# Patient Record
Sex: MALE | Race: WHITE | Employment: OTHER | URBAN - METROPOLITAN AREA
[De-identification: names, ages, dates, MRNs, and addresses within clinical notes are randomized per-mention and may not be internally consistent; named-entity substitution may affect disease eponyms.]

---

## 2017-02-28 ENCOUNTER — OFFICE VISIT (OUTPATIENT)
Dept: INTERNAL MEDICINE CLINIC | Facility: CLINIC | Age: 72
End: 2017-02-28

## 2017-02-28 VITALS
HEART RATE: 67 BPM | SYSTOLIC BLOOD PRESSURE: 102 MMHG | OXYGEN SATURATION: 98 % | BODY MASS INDEX: 24 KG/M2 | WEIGHT: 159.19 LBS | TEMPERATURE: 98 F | RESPIRATION RATE: 14 BRPM | DIASTOLIC BLOOD PRESSURE: 66 MMHG

## 2017-02-28 DIAGNOSIS — E78.5 HYPERLIPIDEMIA, UNSPECIFIED HYPERLIPIDEMIA TYPE: ICD-10-CM

## 2017-02-28 DIAGNOSIS — C02.9 TONGUE CANCER (HCC): ICD-10-CM

## 2017-02-28 DIAGNOSIS — Z98.890 HISTORY OF AAA (ABDOMINAL AORTIC ANEURYSM) REPAIR: ICD-10-CM

## 2017-02-28 DIAGNOSIS — I25.10 CORONARY ARTERY DISEASE INVOLVING NATIVE CORONARY ARTERY OF NATIVE HEART WITHOUT ANGINA PECTORIS: Primary | ICD-10-CM

## 2017-02-28 DIAGNOSIS — Z00.00 ROUTINE HEALTH MAINTENANCE: ICD-10-CM

## 2017-02-28 DIAGNOSIS — Z86.010 HISTORY OF COLON POLYPS: ICD-10-CM

## 2017-02-28 PROCEDURE — 99214 OFFICE O/P EST MOD 30 MIN: CPT | Performed by: INTERNAL MEDICINE

## 2017-02-28 PROCEDURE — G0463 HOSPITAL OUTPT CLINIC VISIT: HCPCS | Performed by: INTERNAL MEDICINE

## 2017-02-28 NOTE — PROGRESS NOTES
Rebecca Erickson is a 66year old male   HPI:   Pt.presents for the following problems. Patient has a history of tongue cancer. No evidence of disease. Patient sees 3 physicians for follow-up. He sees ENT at [facility].   Dr. Aby Odonnell from radiation therapy and by mouth daily. Disp:  Rfl:    Polyethyl Glycol-Propyl Glycol (SYSTANE) 0.4-0.3 % Ophthalmic Solution 1 drop daily as needed.  As directed  Disp:  Rfl:    Calcium Carb-Cholecalciferol (CALCIUM 600 + D) 600-200 MG-UNIT Oral Tab Take 1 tablet by mouth 2 (two) Disease Mother      CAD   • Heart Surgery Mother [de-identified]     CABG      Social History:    Smoking Status: Former Smoker                   Packs/Day: 0.75  Years: 22        Quit date: 06/15/2003    Smokeless Status: Never Used                        Alcohol Use: (abdominal aortic aneurysm) repair  Asymptomatic. Patient due for updated CT aorta. Patient has order. 4. Tongue cancer ([facility] [address]  Asymptomatic. Followed by oncology, radiation therapy and ENT.     5. Routine health maintenance  Patient up-to-date with hi

## 2017-03-17 ENCOUNTER — PRIOR ORIGINAL RECORDS (OUTPATIENT)
Dept: OTHER | Age: 72
End: 2017-03-17

## 2017-04-11 ENCOUNTER — HOSPITAL ENCOUNTER (OUTPATIENT)
Dept: CT IMAGING | Facility: HOSPITAL | Age: 72
Discharge: HOME | End: 2017-04-11
Attending: RADIOLOGY
Payer: MEDICARE

## 2017-04-11 DIAGNOSIS — I71.4 AAA (ABDOMINAL AORTIC ANEURYSM) WITHOUT RUPTURE (HCC): ICD-10-CM

## 2017-04-11 PROCEDURE — 82565 ASSAY OF CREATININE: CPT

## 2017-04-11 PROCEDURE — 74178 CT ABD&PLV WO CNTR FLWD CNTR: CPT

## 2017-04-20 ENCOUNTER — TELEPHONE (OUTPATIENT)
Dept: INTERNAL MEDICINE CLINIC | Facility: CLINIC | Age: 72
End: 2017-04-20

## 2017-04-20 NOTE — TELEPHONE ENCOUNTER
Received communication from Dr. Shankar Murray regarding patient's CAT scan. He indicated that a reviewed CT. Status post endovascular AAA repair April 13, 2015. Endograft remains well-positioned. No endoleak.   Given good outcome and continued stability yearly C

## 2017-08-29 ENCOUNTER — OFFICE VISIT (OUTPATIENT)
Dept: INTERNAL MEDICINE CLINIC | Facility: CLINIC | Age: 72
End: 2017-08-29

## 2017-08-29 VITALS
HEIGHT: 69 IN | TEMPERATURE: 98 F | SYSTOLIC BLOOD PRESSURE: 98 MMHG | WEIGHT: 157.19 LBS | DIASTOLIC BLOOD PRESSURE: 64 MMHG | OXYGEN SATURATION: 98 % | BODY MASS INDEX: 23.28 KG/M2 | HEART RATE: 60 BPM

## 2017-08-29 DIAGNOSIS — I25.10 CORONARY ARTERY DISEASE INVOLVING NATIVE CORONARY ARTERY OF NATIVE HEART WITHOUT ANGINA PECTORIS: Primary | ICD-10-CM

## 2017-08-29 DIAGNOSIS — Z86.010 HISTORY OF COLON POLYPS: ICD-10-CM

## 2017-08-29 DIAGNOSIS — C02.9 TONGUE CANCER (HCC): ICD-10-CM

## 2017-08-29 DIAGNOSIS — J92.0 ASBESTOS-INDUCED PLEURAL PLAQUE: ICD-10-CM

## 2017-08-29 DIAGNOSIS — E78.5 HYPERLIPIDEMIA, UNSPECIFIED HYPERLIPIDEMIA TYPE: ICD-10-CM

## 2017-08-29 DIAGNOSIS — Z98.890 HISTORY OF AAA (ABDOMINAL AORTIC ANEURYSM) REPAIR: ICD-10-CM

## 2017-08-29 DIAGNOSIS — Z00.00 ROUTINE HEALTH MAINTENANCE: ICD-10-CM

## 2017-08-29 PROCEDURE — 99214 OFFICE O/P EST MOD 30 MIN: CPT | Performed by: INTERNAL MEDICINE

## 2017-08-29 PROCEDURE — G0463 HOSPITAL OUTPT CLINIC VISIT: HCPCS | Performed by: INTERNAL MEDICINE

## 2017-08-29 NOTE — PROGRESS NOTES
Rebecca Erickson is a 66year old male   HPI:   Pt.presents for the following problems. Patient feels well. He has no complaints. He has a history of coronary artery disease. Status post CABG.   He will see Dr. Kavitha Black next year with stress test.    He (SYSTANE) 0.4-0.3 % Ophthalmic Solution 1 drop daily as needed. As directed  Disp:  Rfl:    Calcium Carb-Cholecalciferol (CALCIUM 600 + D) 600-200 MG-UNIT Oral Tab Take 1 tablet by mouth 2 (two) times daily.  Disp:  Rfl:    Magnesium Cl-Calcium Carbonate (S CAD      Social History:  Smoking status: Former Smoker                                                              Packs/day: 0.75      Years: 25.00        Quit date: 6/15/2003  Smokeless tobacco: Never Used                      Alcohol use:  No year.    2. Hyperlipidemia, unspecified hyperlipidemia type  Stable. Lipids last December were at goal.  Will recheck lipid sometime this December    3. History of AAA (abdominal aortic aneurysm) repair  Patient had CT scan with Dr. Radha Singh April 2017.   Next

## 2017-10-24 RX ORDER — SIMVASTATIN 40 MG
TABLET ORAL
Qty: 90 TABLET | Refills: 3 | Status: SHIPPED | OUTPATIENT
Start: 2017-10-24 | End: 2018-09-17

## 2018-03-19 ENCOUNTER — APPOINTMENT (OUTPATIENT)
Dept: LAB | Age: 73
End: 2018-03-19
Attending: INTERNAL MEDICINE
Payer: MEDICARE

## 2018-03-19 ENCOUNTER — MYAURORA ACCOUNT LINK (OUTPATIENT)
Dept: OTHER | Age: 73
End: 2018-03-19

## 2018-03-19 ENCOUNTER — TELEPHONE (OUTPATIENT)
Dept: INTERNAL MEDICINE CLINIC | Facility: CLINIC | Age: 73
End: 2018-03-19

## 2018-03-19 ENCOUNTER — OFFICE VISIT (OUTPATIENT)
Dept: INTERNAL MEDICINE CLINIC | Facility: CLINIC | Age: 73
End: 2018-03-19

## 2018-03-19 VITALS
WEIGHT: 158 LBS | SYSTOLIC BLOOD PRESSURE: 130 MMHG | DIASTOLIC BLOOD PRESSURE: 66 MMHG | TEMPERATURE: 99 F | BODY MASS INDEX: 23 KG/M2 | HEART RATE: 64 BPM

## 2018-03-19 DIAGNOSIS — I25.10 CORONARY ARTERY DISEASE INVOLVING NATIVE CORONARY ARTERY OF NATIVE HEART WITHOUT ANGINA PECTORIS: Primary | ICD-10-CM

## 2018-03-19 DIAGNOSIS — Z98.890 HISTORY OF AAA (ABDOMINAL AORTIC ANEURYSM) REPAIR: ICD-10-CM

## 2018-03-19 DIAGNOSIS — K40.90 LEFT INGUINAL HERNIA: ICD-10-CM

## 2018-03-19 DIAGNOSIS — Z86.010 HISTORY OF COLON POLYPS: ICD-10-CM

## 2018-03-19 DIAGNOSIS — I25.10 CORONARY ARTERY DISEASE INVOLVING NATIVE CORONARY ARTERY OF NATIVE HEART WITHOUT ANGINA PECTORIS: ICD-10-CM

## 2018-03-19 DIAGNOSIS — C02.9 TONGUE CANCER (HCC): ICD-10-CM

## 2018-03-19 DIAGNOSIS — Z00.00 ROUTINE HEALTH MAINTENANCE: ICD-10-CM

## 2018-03-19 LAB
CHOLEST SERPL-MCNC: 160 MG/DL (ref 110–200)
CHOLESTEROL, TOTAL: 160 MG/DL
HDL CHOLESTEROL: 56 MG/DL
HDLC SERPL-MCNC: 56 MG/DL
LDL CHOLESTEROL: 90 MG/DL
LDLC SERPL CALC-MCNC: 90 MG/DL (ref 0–99)
NON-HDL CHOLESTEROL: 104 MG/DL
NONHDLC SERPL-MCNC: 104 MG/DL
TRIGL SERPL-MCNC: 71 MG/DL (ref 1–149)
TRIGLYCERIDES: 71 MG/DL

## 2018-03-19 PROCEDURE — 36415 COLL VENOUS BLD VENIPUNCTURE: CPT

## 2018-03-19 PROCEDURE — 99214 OFFICE O/P EST MOD 30 MIN: CPT | Performed by: INTERNAL MEDICINE

## 2018-03-19 PROCEDURE — 80061 LIPID PANEL: CPT

## 2018-03-19 PROCEDURE — G0463 HOSPITAL OUTPT CLINIC VISIT: HCPCS | Performed by: INTERNAL MEDICINE

## 2018-03-19 NOTE — TELEPHONE ENCOUNTER
Message relayed to patient who verbalized understanding. Copy of 3/19/18 lipid panel results faxed to Dr. Leesa Samuels and put in the outgoing mail addressed to the pt's home address as listed in Epic. Nothing further at this time.

## 2018-03-19 NOTE — TELEPHONE ENCOUNTER
Please let patient know that his cholesterol came out satisfactory. He can have the numbers. Please fax to Dr. Elgin Romero. Thank you.

## 2018-03-19 NOTE — PROGRESS NOTES
Merlyn Dent is a 67year old male   HPI:   Pt.presents for the following problems. Patient doing well. He has no acute symptoms.     He did say that he was told that Dr. Diego Christianson would call with need for follow-up in regards to his abdominal aortic aneur TAKE 1 TABLET EVERY NIGHT Disp: 90 tablet Rfl: 3   latanoprost 0.005 % Ophthalmic Solution Place 1 drop into both eyes nightly. Disp:  Rfl:    Multiple Vitamins-Minerals (ICAPS AREDS FORMULA) Oral Tab Take 1 tablet by mouth daily.  Disp:  Rfl:    aspirin Lesion  No date: OTHER SURGICAL HISTORY      Comment: repair abdominal aneurysm  No date: TONSILLECTOMY   Family History   Problem Relation Age of Onset   • Cancer Father      bladder cancer   • Heart Disease Father      congenital heart disease   • Heart Patient declines be last MARLENY February thousand 17. Next colonoscopy February 2019  EXTREMITIES:  no cyanosis, clubbing or edema. NEURO:  Awake and aware. ASSESSMENT AND PLAN:   1.  Coronary artery disease involving native coronary artery of native h

## 2018-03-20 ENCOUNTER — MYAURORA ACCOUNT LINK (OUTPATIENT)
Dept: OTHER | Age: 73
End: 2018-03-20

## 2018-03-20 ENCOUNTER — PRIOR ORIGINAL RECORDS (OUTPATIENT)
Dept: OTHER | Age: 73
End: 2018-03-20

## 2018-03-21 ENCOUNTER — EXTERNAL RECORD (OUTPATIENT)
Dept: HEALTH INFORMATION MANAGEMENT | Facility: OTHER | Age: 73
End: 2018-03-21

## 2018-05-17 ENCOUNTER — HOSPITAL ENCOUNTER (OUTPATIENT)
Dept: CT IMAGING | Facility: HOSPITAL | Age: 73
Discharge: HOME | End: 2018-05-17
Attending: RADIOLOGY
Payer: MEDICARE

## 2018-05-17 DIAGNOSIS — I71.4 AAA (ABDOMINAL AORTIC ANEURYSM) (HCC): ICD-10-CM

## 2018-05-17 PROCEDURE — 82565 ASSAY OF CREATININE: CPT

## 2018-05-17 PROCEDURE — 74178 CT ABD&PLV WO CNTR FLWD CNTR: CPT | Performed by: RADIOLOGY

## 2018-05-29 ENCOUNTER — LAB ENCOUNTER (OUTPATIENT)
Dept: LAB | Age: 73
End: 2018-05-29
Attending: INTERNAL MEDICINE
Payer: MEDICARE

## 2018-05-29 DIAGNOSIS — I25.10 CAD (CORONARY ARTERY DISEASE): Primary | ICD-10-CM

## 2018-05-29 PROCEDURE — 80061 LIPID PANEL: CPT

## 2018-05-29 PROCEDURE — 36415 COLL VENOUS BLD VENIPUNCTURE: CPT

## 2018-05-29 PROCEDURE — 84460 ALANINE AMINO (ALT) (SGPT): CPT

## 2018-05-29 PROCEDURE — 84450 TRANSFERASE (AST) (SGOT): CPT

## 2018-05-29 PROCEDURE — 82550 ASSAY OF CK (CPK): CPT

## 2018-07-25 ENCOUNTER — HOSPITAL ENCOUNTER (OUTPATIENT)
Dept: GENERAL RADIOLOGY | Age: 73
Discharge: HOME | End: 2018-07-25
Attending: OTOLARYNGOLOGY
Payer: MEDICARE

## 2018-07-25 DIAGNOSIS — C80.1 CANCER (HCC): ICD-10-CM

## 2018-07-25 PROCEDURE — 71046 X-RAY EXAM CHEST 2 VIEWS: CPT | Performed by: OTOLARYNGOLOGY

## 2018-09-11 ENCOUNTER — TELEPHONE (OUTPATIENT)
Dept: INTERNAL MEDICINE CLINIC | Facility: CLINIC | Age: 73
End: 2018-09-11

## 2018-09-11 ENCOUNTER — HOSPITAL ENCOUNTER (OUTPATIENT)
Dept: ULTRASOUND IMAGING | Facility: HOSPITAL | Age: 73
Discharge: HOME | End: 2018-09-11
Attending: CLINICAL NURSE SPECIALIST
Payer: MEDICARE

## 2018-09-11 DIAGNOSIS — I82.512 CHRONIC DEEP VEIN THROMBOSIS (DVT) OF FEMORAL VEIN OF LEFT LOWER EXTREMITY (HCC): ICD-10-CM

## 2018-09-11 PROCEDURE — 93971 EXTREMITY STUDY: CPT | Performed by: CLINICAL NURSE SPECIALIST

## 2018-09-12 NOTE — TELEPHONE ENCOUNTER
Please let patient know that his venous Doppler was good. No blood clots. Here for her to gotten a call from Danika Sanchez.

## 2018-09-13 ENCOUNTER — PRIOR ORIGINAL RECORDS (OUTPATIENT)
Dept: OTHER | Age: 73
End: 2018-09-13

## 2018-09-13 ENCOUNTER — HOSPITAL (OUTPATIENT)
Dept: OTHER | Age: 73
End: 2018-09-13
Attending: RADIOLOGY

## 2018-09-14 ENCOUNTER — TELEPHONE (OUTPATIENT)
Dept: INTERNAL MEDICINE CLINIC | Facility: CLINIC | Age: 73
End: 2018-09-14

## 2018-09-14 NOTE — TELEPHONE ENCOUNTER
Please asked Dr. Misha Lindsay office if they could fax us all the patient's colonoscopies and pathology reports that he had in their file. Trying to complete our file here in the office.

## 2018-09-14 NOTE — TELEPHONE ENCOUNTER
To Dr. Harriet Flaherty - per Dr. Frank Left office: colonoscopies/path reports from 2014, 2016 are being faxed to us.

## 2018-09-17 ENCOUNTER — APPOINTMENT (OUTPATIENT)
Dept: LAB | Age: 73
End: 2018-09-17
Attending: INTERNAL MEDICINE
Payer: MEDICARE

## 2018-09-17 ENCOUNTER — OFFICE VISIT (OUTPATIENT)
Dept: INTERNAL MEDICINE CLINIC | Facility: CLINIC | Age: 73
End: 2018-09-17
Payer: MEDICARE

## 2018-09-17 VITALS
HEIGHT: 68 IN | DIASTOLIC BLOOD PRESSURE: 58 MMHG | SYSTOLIC BLOOD PRESSURE: 92 MMHG | HEART RATE: 72 BPM | WEIGHT: 156.38 LBS | BODY MASS INDEX: 23.7 KG/M2 | TEMPERATURE: 99 F

## 2018-09-17 DIAGNOSIS — Z23 FLU VACCINE NEED: ICD-10-CM

## 2018-09-17 DIAGNOSIS — Z98.890 S/P AAA REPAIR: ICD-10-CM

## 2018-09-17 DIAGNOSIS — Z86.010 HISTORY OF ADENOMATOUS POLYP OF COLON: ICD-10-CM

## 2018-09-17 DIAGNOSIS — I25.10 CORONARY ARTERY DISEASE INVOLVING NATIVE CORONARY ARTERY OF NATIVE HEART WITHOUT ANGINA PECTORIS: Primary | ICD-10-CM

## 2018-09-17 DIAGNOSIS — Z86.79 S/P AAA REPAIR: ICD-10-CM

## 2018-09-17 DIAGNOSIS — C02.9 TONGUE CANCER (HCC): ICD-10-CM

## 2018-09-17 LAB
BILIRUB UR QL: NEGATIVE
COLOR UR: YELLOW
GLUCOSE UR-MCNC: NEGATIVE MG/DL
HGB UR QL STRIP.AUTO: NEGATIVE
KETONES UR-MCNC: NEGATIVE MG/DL
NITRITE UR QL STRIP.AUTO: NEGATIVE
PH UR: 5 [PH] (ref 5–8)
PROT UR-MCNC: NEGATIVE MG/DL
RBC #/AREA URNS AUTO: 2 /HPF
SP GR UR STRIP: 1.03 (ref 1–1.03)
UROBILINOGEN UR STRIP-ACNC: <2
VIT C UR-MCNC: 40 MG/DL
WBC #/AREA URNS AUTO: 7 /HPF

## 2018-09-17 PROCEDURE — 81001 URINALYSIS AUTO W/SCOPE: CPT | Performed by: INTERNAL MEDICINE

## 2018-09-17 PROCEDURE — 87086 URINE CULTURE/COLONY COUNT: CPT | Performed by: INTERNAL MEDICINE

## 2018-09-17 PROCEDURE — G0008 ADMIN INFLUENZA VIRUS VAC: HCPCS | Performed by: INTERNAL MEDICINE

## 2018-09-17 PROCEDURE — 90653 IIV ADJUVANT VACCINE IM: CPT | Performed by: INTERNAL MEDICINE

## 2018-09-17 PROCEDURE — G0463 HOSPITAL OUTPT CLINIC VISIT: HCPCS | Performed by: INTERNAL MEDICINE

## 2018-09-17 PROCEDURE — 99214 OFFICE O/P EST MOD 30 MIN: CPT | Performed by: INTERNAL MEDICINE

## 2018-09-17 RX ORDER — ATORVASTATIN CALCIUM 40 MG/1
40 TABLET, FILM COATED ORAL DAILY
COMMUNITY
Start: 2018-09-10

## 2018-09-17 NOTE — PROGRESS NOTES
Yovana Phoenix is a 67year old male   HPI:   Pt.presents for the following problems. Patient feels well. He has no acute complaints. He is up-to-date with his Oncology visit, ENT visit , and radiation oncology visit. He is asymptomatic.   He has tablet by mouth 2 (two) times daily. Disp:  Rfl:    Magnesium Cl-Calcium Carbonate (SLOW-MAG) 71.5-119 MG Oral Tab EC Take 1 tablet by mouth one time.    Disp: 60 tablet Rfl: 0      Past Medical History:   Diagnosis Date   • AAA (abdominal aortic aneurysm) 25.00        Pack years: 18.75        Quit date: 6/15/2003        Years since quitting: 15.2      Smokeless tobacco: Never Used    Alcohol use: No      Alcohol/week: 0.0 oz    Drug use: No          REVIEW OF SYSTEMS:   GENERAL:  feels well.  No acute distre CULTURE REFLEX    3. Flu vaccine need  Flu vaccine given today.  - FLU VACCINE ADJUVANT IM    4. S/P AAA repair  Up-to-date with his CAT scan. He follows with     5.  History of adenomatous polyp of colon  Per Dr. Scott Toledo next colonoscopy to be c

## 2018-09-19 ENCOUNTER — TELEPHONE (OUTPATIENT)
Dept: INTERNAL MEDICINE CLINIC | Facility: CLINIC | Age: 73
End: 2018-09-19

## 2018-09-19 NOTE — TELEPHONE ENCOUNTER
Urine culture 9/17/18 <10,000 CFU per mL alpha hemolytic Streptococcus. This can wait for Dr. Bo Hutchinson. Routed to him.

## 2018-09-21 ENCOUNTER — TELEPHONE (OUTPATIENT)
Dept: INTERNAL MEDICINE CLINIC | Facility: CLINIC | Age: 73
End: 2018-09-21

## 2018-09-21 PROBLEM — D12.6 TUBULAR ADENOMA OF COLON: Status: ACTIVE | Noted: 2018-09-21

## 2018-09-21 NOTE — TELEPHONE ENCOUNTER
Discussed with patient. Patient has some white cells in his urine. Urine culture negative. Patient asymptomatic. Patient had PSA of 4.3 September 28, 2011. PSA of 8.4 April 15, 2013. October 14, 2013 PSA dropped 1.5. Last PSA April 2015 was 2.18.   I

## 2019-02-11 ENCOUNTER — HOSPITAL (OUTPATIENT)
Dept: OTHER | Age: 74
End: 2019-02-11
Attending: RADIOLOGY

## 2019-02-11 LAB
BUN SERPL-MCNC: 22 MG/DL (ref 6–20)
CREATININE: 1.09 MG/DL (ref 0.6–1.3)

## 2019-02-12 ENCOUNTER — HOSPITAL (OUTPATIENT)
Dept: OTHER | Age: 74
End: 2019-02-12
Attending: ANESTHESIOLOGY

## 2019-02-28 ENCOUNTER — HOSPITAL (OUTPATIENT)
Dept: OTHER | Age: 74
End: 2019-02-28
Attending: ANESTHESIOLOGY

## 2019-02-28 VITALS
BODY MASS INDEX: 23.85 KG/M2 | WEIGHT: 161 LBS | SYSTOLIC BLOOD PRESSURE: 96 MMHG | HEIGHT: 69 IN | HEART RATE: 70 BPM | DIASTOLIC BLOOD PRESSURE: 58 MMHG | OXYGEN SATURATION: 98 %

## 2019-03-01 VITALS
BODY MASS INDEX: 23.7 KG/M2 | DIASTOLIC BLOOD PRESSURE: 62 MMHG | HEIGHT: 69 IN | WEIGHT: 160 LBS | SYSTOLIC BLOOD PRESSURE: 110 MMHG | HEART RATE: 57 BPM | OXYGEN SATURATION: 99 %

## 2019-03-08 RX ORDER — ATORVASTATIN CALCIUM 40 MG/1
1 TABLET, FILM COATED ORAL AT BEDTIME
COMMUNITY
Start: 2018-03-20 | End: 2019-03-19 | Stop reason: SDUPTHER

## 2019-03-08 RX ORDER — TRAVOPROST OPHTHALMIC SOLUTION 0.04 MG/ML
SOLUTION OPHTHALMIC
COMMUNITY
End: 2020-03-20

## 2019-03-08 RX ORDER — RIBOFLAVIN (VITAMIN B2) 100 MG
TABLET ORAL
COMMUNITY

## 2019-03-08 RX ORDER — UBIDECARENONE 30 MG
CAPSULE ORAL
COMMUNITY
End: 2019-03-18 | Stop reason: SDUPTHER

## 2019-03-18 ENCOUNTER — OFFICE VISIT (OUTPATIENT)
Dept: INTERNAL MEDICINE CLINIC | Facility: CLINIC | Age: 74
End: 2019-03-18
Payer: MEDICARE

## 2019-03-18 VITALS
HEIGHT: 68 IN | SYSTOLIC BLOOD PRESSURE: 128 MMHG | DIASTOLIC BLOOD PRESSURE: 78 MMHG | BODY MASS INDEX: 23.92 KG/M2 | TEMPERATURE: 98 F | WEIGHT: 157.81 LBS | HEART RATE: 68 BPM | OXYGEN SATURATION: 98 %

## 2019-03-18 DIAGNOSIS — C02.9 TONGUE CANCER (HCC): ICD-10-CM

## 2019-03-18 DIAGNOSIS — I25.10 CORONARY ARTERY DISEASE INVOLVING NATIVE CORONARY ARTERY OF NATIVE HEART WITHOUT ANGINA PECTORIS: Primary | ICD-10-CM

## 2019-03-18 DIAGNOSIS — I71.4 ABDOMINAL AORTIC ANEURYSM (AAA) WITHOUT RUPTURE (HCC): ICD-10-CM

## 2019-03-18 DIAGNOSIS — R82.90 ABNORMAL URINALYSIS: ICD-10-CM

## 2019-03-18 DIAGNOSIS — Z12.5 PROSTATE CANCER SCREENING: ICD-10-CM

## 2019-03-18 DIAGNOSIS — R73.9 ELEVATED BLOOD SUGAR: ICD-10-CM

## 2019-03-18 PROBLEM — E78.5 DYSLIPIDEMIA: Status: ACTIVE | Noted: 2019-03-18

## 2019-03-18 PROBLEM — J41.0 SMOKERS' COUGH (HCC): Chronic | Status: ACTIVE | Noted: 2019-03-18

## 2019-03-18 PROCEDURE — G0463 HOSPITAL OUTPT CLINIC VISIT: HCPCS | Performed by: INTERNAL MEDICINE

## 2019-03-18 PROCEDURE — 99214 OFFICE O/P EST MOD 30 MIN: CPT | Performed by: INTERNAL MEDICINE

## 2019-03-18 RX ORDER — VIT A/VIT C/VIT E/ZINC/COPPER 4296-226
CAPSULE ORAL
COMMUNITY
Start: 2015-03-03 | End: 2019-03-19 | Stop reason: CLARIF

## 2019-03-18 RX ORDER — UBIDECARENONE 30 MG
CAPSULE ORAL
COMMUNITY
Start: 2015-03-03 | End: 2019-03-19 | Stop reason: CLARIF

## 2019-03-18 NOTE — PROGRESS NOTES
Barrie Divers is a 75year old male   HPI:   Pt.presents for the following problems. Patient generally feels well. He has no acute complaints today. He has had some lower lumbar back pain and left hip pain. He has seen orthopedics.   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 He is asymptomatic    Wt Readings from Last 3 Encounters:  03/18/19 : 157 lb 12.8 oz (71.6 kg)  09/17/18 : 156 lb 6.4 oz (70.9 kg)  03/19/18 : 158 lb (71.7 kg)    Body mass index is 23.99 kg/m².        Current Outpati Laterality Date   • APPENDECTOMY  2009   • CABG  2003    single bypass LIMA graft to LAD   • IR REPAIR AAA ENDOVASCULAR     • KNEE SURGERY Right 1972    cartilage removed   • OTHER SURGICAL HISTORY  8/2014    Modified Neck Dissection   • OTHER SURGICAL HIS 23.99 kg/m²     GENERAL:  well developed, well nourished in no apparent distress  SKIN:  no rashes, no suspicious lesions in areas examined. HEENT:  atraumatic,  Pharynx without exudate or erythema. EYES;  PERRL. conjunctiva are clear  NECK:  Supple.   no Gunner Patterson MD  3/18/2019  1:09 PM

## 2019-03-19 ENCOUNTER — APPOINTMENT (OUTPATIENT)
Dept: LAB | Facility: HOSPITAL | Age: 74
End: 2019-03-19
Attending: INTERNAL MEDICINE
Payer: MEDICARE

## 2019-03-19 ENCOUNTER — OFFICE VISIT (OUTPATIENT)
Dept: CARDIOLOGY | Age: 74
End: 2019-03-19

## 2019-03-19 DIAGNOSIS — I71.40 ANEURYSM OF ABDOMINAL VESSEL (CMD): ICD-10-CM

## 2019-03-19 DIAGNOSIS — E78.5 DYSLIPIDEMIA: ICD-10-CM

## 2019-03-19 DIAGNOSIS — I25.10 CORONARY ARTERY DISEASE INVOLVING NATIVE CORONARY ARTERY OF NATIVE HEART WITHOUT ANGINA PECTORIS: ICD-10-CM

## 2019-03-19 DIAGNOSIS — I25.10 CORONARY ARTERY DISEASE INVOLVING NATIVE CORONARY ARTERY OF NATIVE HEART WITHOUT ANGINA PECTORIS: Primary | ICD-10-CM

## 2019-03-19 DIAGNOSIS — R73.9 ELEVATED BLOOD SUGAR: ICD-10-CM

## 2019-03-19 DIAGNOSIS — Z12.5 PROSTATE CANCER SCREENING: ICD-10-CM

## 2019-03-19 DIAGNOSIS — R82.90 ABNORMAL URINALYSIS: ICD-10-CM

## 2019-03-19 LAB
ALBUMIN SERPL-MCNC: 3.5 G/DL
ALBUMIN SERPL-MCNC: 3.5 G/DL (ref 3.4–5)
ALP LIVER SERPL-CCNC: 120 U/L (ref 45–117)
ALP SERPL-CCNC: 120 U/L
ALT SERPL-CCNC: 23 U/L
ALT SERPL-CCNC: 23 U/L (ref 16–61)
AST SERPL-CCNC: 13 U/L
AST SERPL-CCNC: 13 U/L (ref 15–37)
BILIRUB CONJ SERPL-MCNC: NORMAL MG/DL
BILIRUB DIRECT SERPL-MCNC: 0.2 MG/DL (ref 0–0.2)
BILIRUB SERPL-MCNC: 0.5 MG/DL
BILIRUB SERPL-MCNC: 0.5 MG/DL (ref 0.1–2)
BILIRUB UR QL: NEGATIVE
CHOLEST SERPL-MCNC: 127 MG/DL
CHOLEST SMN-MCNC: 127 MG/DL (ref ?–200)
CHOLEST/HDLC SERPL: NORMAL {RATIO}
CLARITY UR: CLEAR
COLOR UR: YELLOW
COMPLEXED PSA SERPL-MCNC: 1.59 NG/ML (ref ?–4)
EST. AVERAGE GLUCOSE BLD GHB EST-MCNC: 120 MG/DL (ref 68–126)
EST. AVERAGE GLUCOSE BLD GHB EST-MCNC: NORMAL MG/DL
GLUCOSE SERPL-MCNC: 120 MG/DL
GLUCOSE UR-MCNC: NEGATIVE MG/DL
HBA1C MFR BLD HPLC: 5.8 % (ref ?–5.7)
HBA1C MFR BLD: 5.8 %
HBA1C MFR BLD: NORMAL % (ref 4.5–5.6)
HDLC SERPL-MCNC: 52 MG/DL
HDLC SERPL-MCNC: 52 MG/DL (ref 40–59)
HGB UR QL STRIP.AUTO: NEGATIVE
KETONES UR-MCNC: NEGATIVE MG/DL
LDLC SERPL CALC-MCNC: 58 MG/DL
LDLC SERPL CALC-MCNC: 58 MG/DL (ref ?–100)
LENGTH OF FAST TIME PATIENT: NORMAL H
M PROTEIN MFR SERPL ELPH: 7.1 G/DL (ref 6.4–8.2)
NITRITE UR QL STRIP.AUTO: NEGATIVE
NONHDLC SERPL-MCNC: 75 MG/DL (ref ?–130)
NONHDLC SERPL-MCNC: NORMAL MG/DL
PH UR: 6 [PH] (ref 5–8)
PROT SERPL-MCNC: 7.1 G/DL
PROT UR-MCNC: NEGATIVE MG/DL
RBC #/AREA URNS AUTO: 1 /HPF
SP GR UR STRIP: 1.02 (ref 1–1.03)
TRIGL SERPL-MCNC: 86 MG/DL
TRIGL SERPL-MCNC: 86 MG/DL (ref 30–149)
UROBILINOGEN UR STRIP-ACNC: <2
VIT C UR-MCNC: NEGATIVE MG/DL
VLDLC SERPL CALC-MCNC: 17 MG/DL (ref 0–30)
VLDLC SERPL CALC-MCNC: NORMAL MG/DL
WBC #/AREA URNS AUTO: 6 /HPF

## 2019-03-19 PROCEDURE — 83036 HEMOGLOBIN GLYCOSYLATED A1C: CPT

## 2019-03-19 PROCEDURE — 99214 OFFICE O/P EST MOD 30 MIN: CPT | Performed by: INTERNAL MEDICINE

## 2019-03-19 PROCEDURE — 81001 URINALYSIS AUTO W/SCOPE: CPT

## 2019-03-19 PROCEDURE — 87086 URINE CULTURE/COLONY COUNT: CPT

## 2019-03-19 PROCEDURE — 80076 HEPATIC FUNCTION PANEL: CPT

## 2019-03-19 PROCEDURE — 80061 LIPID PANEL: CPT

## 2019-03-19 PROCEDURE — 36415 COLL VENOUS BLD VENIPUNCTURE: CPT

## 2019-03-19 PROCEDURE — 87077 CULTURE AEROBIC IDENTIFY: CPT

## 2019-03-19 RX ORDER — LATANOPROST 50 UG/ML
1 SOLUTION/ DROPS OPHTHALMIC
COMMUNITY
Start: 2017-02-20 | End: 2020-03-20

## 2019-03-19 ASSESSMENT — PAIN SCALES - GENERAL: PAINLEVEL: 0

## 2019-03-20 ENCOUNTER — CLINICAL ABSTRACT (OUTPATIENT)
Dept: CARDIOLOGY | Age: 74
End: 2019-03-20

## 2019-03-20 RX ORDER — ATORVASTATIN CALCIUM 40 MG/1
TABLET, FILM COATED ORAL
Qty: 90 TABLET | Refills: 3 | Status: SHIPPED | OUTPATIENT
Start: 2019-03-20 | End: 2020-04-08

## 2019-03-22 ENCOUNTER — TELEPHONE (OUTPATIENT)
Dept: CARDIOLOGY | Age: 74
End: 2019-03-22

## 2019-03-26 ENCOUNTER — TELEPHONE (OUTPATIENT)
Dept: INTERNAL MEDICINE CLINIC | Facility: CLINIC | Age: 74
End: 2019-03-26

## 2019-03-26 DIAGNOSIS — R74.8 ELEVATED ALKALINE PHOSPHATASE LEVEL: Primary | ICD-10-CM

## 2019-03-26 NOTE — TELEPHONE ENCOUNTER
Please call patient and let him know that I collected his recent labs and will mail him the results. I generated a letter for him regarding the results. If he probably knows I did discuss results with his wife Tadeo Carrillo over the weekend.   One mild abnormal

## 2019-03-26 NOTE — TELEPHONE ENCOUNTER
Spoke with Deepa Hurd and relayed MD message below. Pt voiced understanding and will call back to set up a follow up/labs. Mailed Letter, Labs, and Orders to Deepa Hurd' home.

## 2019-03-28 ENCOUNTER — HOSPITAL (OUTPATIENT)
Dept: OTHER | Age: 74
End: 2019-03-28
Attending: ANESTHESIOLOGY

## 2019-04-23 DIAGNOSIS — I71.4 AAA (ABDOMINAL AORTIC ANEURYSM) (HCC): Primary | ICD-10-CM

## 2019-04-25 ENCOUNTER — HOSPITAL (OUTPATIENT)
Dept: OTHER | Age: 74
End: 2019-04-25
Attending: ANESTHESIOLOGY

## 2019-05-09 ENCOUNTER — HOSPITAL (OUTPATIENT)
Dept: OTHER | Age: 74
End: 2019-05-09
Attending: ANESTHESIOLOGY

## 2019-05-17 ENCOUNTER — HOSPITAL ENCOUNTER (OUTPATIENT)
Dept: CT IMAGING | Facility: HOSPITAL | Age: 74
Discharge: HOME | End: 2019-05-17
Attending: RADIOLOGY
Payer: MEDICARE

## 2019-05-17 DIAGNOSIS — I71.4 AAA (ABDOMINAL AORTIC ANEURYSM) (HCC): ICD-10-CM

## 2019-05-17 PROCEDURE — 74174 CTA ABD&PLVS W/CONTRAST: CPT | Performed by: RADIOLOGY

## 2019-05-17 PROCEDURE — 82565 ASSAY OF CREATININE: CPT

## 2019-05-24 ENCOUNTER — HOSPITAL (OUTPATIENT)
Dept: OTHER | Age: 74
End: 2019-05-24
Attending: ANESTHESIOLOGY

## 2019-07-16 ENCOUNTER — APPOINTMENT (OUTPATIENT)
Dept: LAB | Facility: HOSPITAL | Age: 74
End: 2019-07-16
Attending: INTERNAL MEDICINE
Payer: MEDICARE

## 2019-07-16 DIAGNOSIS — R74.8 ELEVATED ALKALINE PHOSPHATASE LEVEL: ICD-10-CM

## 2019-07-16 LAB
ALBUMIN SERPL-MCNC: 3.8 G/DL (ref 3.4–5)
ALBUMIN/GLOB SERPL: 1.1 {RATIO} (ref 1–2)
ALP LIVER SERPL-CCNC: 104 U/L (ref 45–117)
ALT SERPL-CCNC: 24 U/L (ref 16–61)
ANION GAP SERPL CALC-SCNC: 4 MMOL/L (ref 0–18)
AST SERPL-CCNC: 17 U/L (ref 15–37)
BILIRUB SERPL-MCNC: 0.7 MG/DL (ref 0.1–2)
BUN BLD-MCNC: 24 MG/DL (ref 7–18)
BUN/CREAT SERPL: 23.8 (ref 10–20)
CALCIUM BLD-MCNC: 9.5 MG/DL (ref 8.5–10.1)
CHLORIDE SERPL-SCNC: 109 MMOL/L (ref 98–112)
CO2 SERPL-SCNC: 29 MMOL/L (ref 21–32)
CREAT BLD-MCNC: 1.01 MG/DL (ref 0.7–1.3)
GGT SERPL-CCNC: 5 U/L (ref 15–85)
GLOBULIN PLAS-MCNC: 3.5 G/DL (ref 2.8–4.4)
GLUCOSE BLD-MCNC: 74 MG/DL (ref 70–99)
M PROTEIN MFR SERPL ELPH: 7.3 G/DL (ref 6.4–8.2)
OSMOLALITY SERPL CALC.SUM OF ELEC: 297 MOSM/KG (ref 275–295)
PATIENT FASTING: NO
POTASSIUM SERPL-SCNC: 4.5 MMOL/L (ref 3.5–5.1)
SODIUM SERPL-SCNC: 142 MMOL/L (ref 136–145)

## 2019-07-16 PROCEDURE — 36415 COLL VENOUS BLD VENIPUNCTURE: CPT

## 2019-07-16 PROCEDURE — 80053 COMPREHEN METABOLIC PANEL: CPT

## 2019-07-16 PROCEDURE — 82977 ASSAY OF GGT: CPT

## 2019-07-18 ENCOUNTER — TELEPHONE (OUTPATIENT)
Dept: INTERNAL MEDICINE CLINIC | Facility: CLINIC | Age: 74
End: 2019-07-18

## 2019-07-18 NOTE — TELEPHONE ENCOUNTER
Please let patient know that his labs came out good. We did this primarily to follow-up on a mildly elevated enzyme called alkaline phosphatase that was noted in his labs in March.   Now it is totally normal.  We will continue just routine follow-up twice

## 2019-07-24 ENCOUNTER — HOSPITAL (OUTPATIENT)
Dept: OTHER | Age: 74
End: 2019-07-24
Attending: ANESTHESIOLOGY

## 2019-08-27 ENCOUNTER — MA CHART PREP (OUTPATIENT)
Dept: FAMILY MEDICINE CLINIC | Facility: CLINIC | Age: 74
End: 2019-08-27

## 2019-08-27 PROBLEM — J44.9 COPD (CHRONIC OBSTRUCTIVE PULMONARY DISEASE) (HCC): Status: ACTIVE | Noted: 2019-08-27

## 2019-08-27 PROBLEM — M47.816 ARTHRITIS OF FACET JOINT OF LUMBAR SPINE: Status: ACTIVE | Noted: 2019-08-27

## 2019-09-05 ENCOUNTER — TELEPHONE (OUTPATIENT)
Dept: INTERNAL MEDICINE CLINIC | Facility: CLINIC | Age: 74
End: 2019-09-05

## 2019-09-05 DIAGNOSIS — R74.8 ELEVATED ALKALINE PHOSPHATASE LEVEL: Primary | ICD-10-CM

## 2019-09-05 NOTE — TELEPHONE ENCOUNTER
Please let patient know that I did get lab results from Dr. Stephane Fletcher. They overall look satisfactory. Blood count good without anemia. Thyroid good. One liver enzyme was slightly elevated called alkaline phosphatase. He has had this before.   In the past

## 2019-09-05 NOTE — TELEPHONE ENCOUNTER
Called patient and relayed Dr Cynthia Sy message to him. He verbalized understanding. To Dr Gilbert Bravo end of the message was a little unclear. Yuvonataliee Manifold Yuvonataliee Manifold [employer] Pt is willing to do blood tests before his visit if you would like him to

## 2019-09-10 ENCOUNTER — HOSPITAL (OUTPATIENT)
Dept: OTHER | Age: 74
End: 2019-09-10
Attending: ANESTHESIOLOGY

## 2019-09-10 ENCOUNTER — APPOINTMENT (OUTPATIENT)
Dept: LAB | Age: 74
End: 2019-09-10
Attending: INTERNAL MEDICINE
Payer: MEDICARE

## 2019-09-10 ENCOUNTER — HOSPITAL (OUTPATIENT)
Dept: OTHER | Age: 74
End: 2019-09-10

## 2019-09-10 DIAGNOSIS — R74.8 ELEVATED ALKALINE PHOSPHATASE LEVEL: ICD-10-CM

## 2019-09-10 LAB
ALBUMIN SERPL-MCNC: 3.6 G/DL (ref 3.4–5)
ALP LIVER SERPL-CCNC: 132 U/L (ref 45–117)
ALT SERPL-CCNC: 27 U/L (ref 16–61)
AST SERPL-CCNC: 17 U/L (ref 15–37)
BILIRUB DIRECT SERPL-MCNC: 0.2 MG/DL (ref 0–0.2)
BILIRUB SERPL-MCNC: 0.7 MG/DL (ref 0.1–2)
CHOLEST SMN-MCNC: 135 MG/DL (ref ?–200)
GGT SERPL-CCNC: 4 U/L (ref 15–85)
HDLC SERPL-MCNC: 55 MG/DL (ref 40–59)
LDLC SERPL CALC-MCNC: 65 MG/DL (ref ?–100)
M PROTEIN MFR SERPL ELPH: 6.8 G/DL (ref 6.4–8.2)
NONHDLC SERPL-MCNC: 80 MG/DL (ref ?–130)
PATIENT FASTING: YES
TRIGL SERPL-MCNC: 74 MG/DL (ref 30–149)
VLDLC SERPL CALC-MCNC: 15 MG/DL (ref 0–30)

## 2019-09-10 PROCEDURE — 80076 HEPATIC FUNCTION PANEL: CPT

## 2019-09-10 PROCEDURE — 82977 ASSAY OF GGT: CPT

## 2019-09-10 PROCEDURE — 36415 COLL VENOUS BLD VENIPUNCTURE: CPT

## 2019-09-10 PROCEDURE — 80061 LIPID PANEL: CPT

## 2019-09-10 PROCEDURE — 84075 ASSAY ALKALINE PHOSPHATASE: CPT

## 2019-09-10 PROCEDURE — 84080 ASSAY ALKALINE PHOSPHATASES: CPT

## 2019-09-12 LAB
ALK-PHOSPHATASE BONE CALC: 50 U/L
ALK-PHOSPHATASE LIVER CALC: 78 U/L
ALK-PHOSPHATASE OTHER CALC: 0 U/L
ALKALINE PHOSPHATASE: 128 U/L

## 2019-09-19 ENCOUNTER — OFFICE VISIT (OUTPATIENT)
Dept: INTERNAL MEDICINE CLINIC | Facility: CLINIC | Age: 74
End: 2019-09-19
Payer: MEDICARE

## 2019-09-19 VITALS
WEIGHT: 154.63 LBS | OXYGEN SATURATION: 98 % | TEMPERATURE: 98 F | HEART RATE: 65 BPM | BODY MASS INDEX: 23.99 KG/M2 | SYSTOLIC BLOOD PRESSURE: 90 MMHG | HEIGHT: 67.3 IN | DIASTOLIC BLOOD PRESSURE: 60 MMHG

## 2019-09-19 DIAGNOSIS — I25.10 CORONARY ARTERY DISEASE INVOLVING NATIVE CORONARY ARTERY OF NATIVE HEART WITHOUT ANGINA PECTORIS: ICD-10-CM

## 2019-09-19 DIAGNOSIS — Z86.010 HISTORY OF ADENOMATOUS POLYP OF COLON: ICD-10-CM

## 2019-09-19 DIAGNOSIS — R74.8 ELEVATED ALKALINE PHOSPHATASE LEVEL: ICD-10-CM

## 2019-09-19 DIAGNOSIS — Z00.00 ROUTINE HEALTH MAINTENANCE: ICD-10-CM

## 2019-09-19 DIAGNOSIS — I71.4 ABDOMINAL AORTIC ANEURYSM (AAA) WITHOUT RUPTURE (HCC): ICD-10-CM

## 2019-09-19 DIAGNOSIS — C02.9 TONGUE CANCER (HCC): ICD-10-CM

## 2019-09-19 DIAGNOSIS — Z00.00 MEDICARE ANNUAL WELLNESS VISIT, INITIAL: Primary | ICD-10-CM

## 2019-09-19 PROCEDURE — G0438 PPPS, INITIAL VISIT: HCPCS | Performed by: INTERNAL MEDICINE

## 2019-09-19 PROCEDURE — 96160 PT-FOCUSED HLTH RISK ASSMT: CPT | Performed by: INTERNAL MEDICINE

## 2019-09-19 PROCEDURE — 99397 PER PM REEVAL EST PAT 65+ YR: CPT | Performed by: INTERNAL MEDICINE

## 2019-09-19 NOTE — PROGRESS NOTES
Kay Mi is a 75year old male   HPI:   Pt.presents for the following problems. Patient here for Medicare annual wellness visit    Patient has coronary disease. He will see Dr. iMra Phelps in March. Is asymptomatic. History of CABG.     Patient has a tablet by mouth daily. Disp:  Rfl:    Polyethyl Glycol-Propyl Glycol (SYSTANE) 0.4-0.3 % Ophthalmic Solution 1 drop daily as needed.  As directed  Disp:  Rfl:    Calcium Carb-Cholecalciferol (CALCIUM 600 + D) 600-200 MG-UNIT Oral Tab Take 1 tablet by mouth MI   • Heart Disease Mother         CAD   • Heart Surgery Mother [de-identified]        CABG   • Heart Disease Other         CAD      Social History:  Social History    Tobacco Use      Smoking status: Former Smoker        Packs/day: 0.75        Years: 25.00        P this year. Last colonoscopy December 2016. He also had colonoscopy 2014. He has a history of adenomatous polyp  MUSCULOSKELETAL:  back is not tender, no joint swelling  EXTREMITIES:  no cyanosis, clubbing or edema. NEURO:  Awake and aware.         Gen of ?: Yes    Do you have a living will?: Yes     Hearing Assessment (Required for AWV/SWV)      Hearing Screening    Screening Method:  Questionnaire  I have a problem hearing over the telephone:  No I have trouble following the conversations when CABG.  Patient has a remote history of smoking for about 35 years but quit in 2003. 3. Elevated alkaline phosphatase level  Long-standing intermittent elevated alkaline phosphatase. Recent isoenzymes do not show anything from bone or liver.   Patient wi

## 2019-09-23 ENCOUNTER — TELEPHONE (OUTPATIENT)
Dept: INTERNAL MEDICINE CLINIC | Facility: CLINIC | Age: 74
End: 2019-09-23

## 2019-09-23 NOTE — TELEPHONE ENCOUNTER
Please call patient sometime today in let him know that when I was completing my note from last office visit I had wanted to ask him the date he stopped smoking. This is just to complete our record.   Please ask him if he can remember when he started and w

## 2019-09-23 NOTE — TELEPHONE ENCOUNTER
To DR. VILLALPANDO - called patient who states he started smoking in 1970 , quit in August of 2003 and smoked 1 pack a day

## 2019-09-26 ENCOUNTER — HOSPITAL (OUTPATIENT)
Dept: OTHER | Age: 74
End: 2019-09-26
Attending: ANESTHESIOLOGY

## 2019-10-03 ENCOUNTER — PRIOR ORIGINAL RECORDS (OUTPATIENT)
Dept: OTHER | Age: 74
End: 2019-10-03

## 2019-10-03 ENCOUNTER — HOSPITAL (OUTPATIENT)
Dept: OTHER | Age: 74
End: 2019-10-03
Attending: RADIOLOGY

## 2019-10-09 ENCOUNTER — HOSPITAL (OUTPATIENT)
Dept: OTHER | Age: 74
End: 2019-10-09
Attending: ANESTHESIOLOGY

## 2019-11-11 ENCOUNTER — HOSPITAL (OUTPATIENT)
Dept: OTHER | Age: 74
End: 2019-11-11
Attending: ORTHOPAEDIC SURGERY

## 2019-11-11 LAB
BUN SERPL-MCNC: 30 MG/DL (ref 6–20)
CREATININE: 1.08 MG/DL (ref 0.6–1.3)

## 2019-11-26 ENCOUNTER — PROCEDURE VISIT (OUTPATIENT)
Dept: NEUROLOGY | Facility: CLINIC | Age: 74
End: 2019-11-26
Payer: MEDICARE

## 2019-11-26 ENCOUNTER — HOSPITAL ENCOUNTER (OUTPATIENT)
Dept: MRI IMAGING | Facility: HOSPITAL | Age: 74
Discharge: HOME | End: 2019-11-26
Attending: ORTHOPAEDIC SURGERY
Payer: MEDICARE

## 2019-11-26 DIAGNOSIS — R20.0 NUMBNESS AND TINGLING OF BOTH LEGS: Primary | ICD-10-CM

## 2019-11-26 DIAGNOSIS — R20.2 NUMBNESS AND TINGLING OF BOTH LEGS: Primary | ICD-10-CM

## 2019-11-26 DIAGNOSIS — M54.10 RADICULAR SYNDROME OF LOWER LIMBS: ICD-10-CM

## 2019-11-26 PROCEDURE — 95886 MUSC TEST DONE W/N TEST COMP: CPT | Performed by: PHYSICAL MEDICINE & REHABILITATION

## 2019-11-26 PROCEDURE — 95911 NRV CNDJ TEST 9-10 STUDIES: CPT | Performed by: PHYSICAL MEDICINE & REHABILITATION

## 2019-11-26 PROCEDURE — 72148 MRI LUMBAR SPINE W/O DYE: CPT | Performed by: ORTHOPAEDIC SURGERY

## 2019-11-26 NOTE — PROCEDURES
[address]   Nerve Conduction Study and Electromyography Procedure Note    Patient: Taz Hinders Hand Dominance:  right   Patient ID: [id number] Referring Dr:  Dr. Kadeem Pruitt   Sex: Male Test Dr:  Dr. Stone Zaragoza Lat leg Ankle 4.69 6.56 16.5 9.9 Lat leg - Ankle 12 26      Ref. ?4.20 ?5.0 ?5.0 Ref. L Superficial peroneal - Ankle      Lat leg Ankle 5.31 6.67 17.3 10.2 Lat leg - Ankle 10 19      Ref. ?4.20 ?5.0 ?5.0 Ref.          H Reflex      Nerve H Lat o the amplitude was reduced for Ankle stimulation  o the velocity was reduced for Fib Head - Ankle segment  o the velocity was reduced for Knee - Fib Head segment    The sensory conduction test was performed on 4 nerve(s).  The results were normal in 1 nerv 1. There is electrodiagnostic evidence of an ACUTE AND CHRONIC LEFT L4 OR L5 lumbar radiculopathy.   There are signs of active denervation and chronic reinnervation on EMG testing in 2 of the muscles in the left lower extremity sharing the left L5 nerve erika

## 2020-01-28 ENCOUNTER — OFFICE VISIT (OUTPATIENT)
Dept: PAIN CLINIC | Facility: HOSPITAL | Age: 75
End: 2020-01-28
Attending: ANESTHESIOLOGY
Payer: MEDICARE

## 2020-01-28 VITALS
DIASTOLIC BLOOD PRESSURE: 78 MMHG | WEIGHT: 164 LBS | RESPIRATION RATE: 18 BRPM | HEIGHT: 68 IN | HEART RATE: 66 BPM | SYSTOLIC BLOOD PRESSURE: 134 MMHG | BODY MASS INDEX: 24.86 KG/M2

## 2020-01-28 DIAGNOSIS — M47.816 ARTHRITIS OF FACET JOINT OF LUMBAR SPINE: Primary | ICD-10-CM

## 2020-01-28 DIAGNOSIS — M51.36 DDD (DEGENERATIVE DISC DISEASE), LUMBAR: ICD-10-CM

## 2020-01-28 DIAGNOSIS — M48.061 FORAMINAL STENOSIS OF LUMBAR REGION: ICD-10-CM

## 2020-01-28 PROBLEM — M51.369 DDD (DEGENERATIVE DISC DISEASE), LUMBAR: Status: ACTIVE | Noted: 2020-01-28

## 2020-01-28 PROCEDURE — 99201 HC OUTPT EVAL AND MGNT NEW PT LEVEL 1: CPT

## 2020-01-28 RX ORDER — GABAPENTIN 300 MG/1
300 CAPSULE ORAL 3 TIMES DAILY
COMMUNITY
End: 2020-02-27

## 2020-01-28 NOTE — CHRONIC PAIN
[facility]  Pain Consultation     HISTORY OF PRESENT ILLNESS:  Leah Phoenix is a 75year old old male referred to the pain clinic by Dr. Javed ref.  provider found for Arthritis of facet joint of lumbar spine ([facility] [address]  (primary encounter di the procedure who recommended a fusion. Patient states that he does not want any surgery at this time. He was referred by Dr. Marina Skinner to our clinic for other options.  Patient did see Dr. Ashlyn Ding orthopedic oncologist, for a left \"groin mass\" but patie above  Coughing/sneezing/straining does not exacerbate the pain.   Numbness/tingling: as above  Weakness: as above  Weight Loss: Negative   Fever: Negative   Cardiovascular:  No current chest pain or palpitations   Respiratory:  No current shortness of madhuri bladder cancer   • Heart Disease Father         congenital heart disease   • Heart Attack Father 72        MI - cause of death   • Cancer Sister         lung cancer   • Cancer Brother         lung cancer   • Heart Disease Brother         CAD   • Heart Concerns:         Service: Not Asked        Blood Transfusions: Not Asked        Caffeine Concern: Yes          Coffee 1 cup daily        Occupational Exposure: Not Asked        Hobby Hazards: Not Asked        Sleep Concern: Not Asked        Stress Deep tendon reflexes: Normal and Symmetric     IMAGING:  PROCEDURE:  MRI SPINE LUMBAR (QOA=83937)     COMPARISON: [facility], CTA ABD/PEL (PRC=84903), 5/17/2019, 11:12.   [facility], [facility]. [facility], [address], 1/23/2 narrowing. L3-L4:   Disk desiccation with bulging disk, facet, and ligamentous hypertrophy results in moderate central canal narrowing and moderate bilateral neural foraminal narrowing.  There is severe narrowing of the bilateral subarticular and lateral r mass    LABS:  Lab Results   Component Value Date    WBC 7.2 12/15/2014    RBC 3.50 (L) 12/15/2014    HGB 10.5 (L) 12/15/2014    HCT 31.2 (L) 12/15/2014    MCV 89.3 12/15/2014    MCH 30.2 12/15/2014    MCHC 33.8 12/15/2014    RDW 19.3 (H) 12/15/2014    PLT the care with the patients health care providers.      Pt will return to clinic  Two weeks following injection series

## 2020-01-28 NOTE — PROGRESS NOTES
1/28/2020   PRESENTS AMBULATORY TO CPM;  ACCOMPANIED BY HIS WIFE;  NEW CONSULT C/O SEVERAL YR HX OF LBP;  PT STATES 1YR AGO THE PAIN STARTED TO GET REALLY BAD;  HX OF A FALL 2015 UNSURE IF THIS IS RELATED OR NOT;  HAS HX OF MULTIPLE INJECTIONS WITH MINIMAL

## 2020-01-29 ENCOUNTER — DOCUMENTATION ONLY (OUTPATIENT)
Dept: PAIN CLINIC | Facility: HOSPITAL | Age: 75
End: 2020-01-29

## 2020-01-29 NOTE — PROGRESS NOTES
Procedure code [id number] APPROVAL     PA needed via [address] [place] [address])     PA started, all clinical notes submitted via fax, to # [phone] @ 2:15pm    Confirmation rcv'd that fax was rcv'd at [address] [place] [address]     Once approval has been rcv'd writer will cayetano

## 2020-02-07 ENCOUNTER — TELEPHONE (OUTPATIENT)
Dept: PAIN CLINIC | Facility: HOSPITAL | Age: 75
End: 2020-02-07

## 2020-02-07 ENCOUNTER — DOCUMENTATION ONLY (OUTPATIENT)
Dept: PAIN CLINIC | Facility: HOSPITAL | Age: 75
End: 2020-02-07

## 2020-02-07 NOTE — PROGRESS NOTES
Procedure code [id number]    Approval rcv'd via Belinda Mcdaniels # [id number]    Valid from 01/29/2020---03/14/2020    Writer will reach out to pt and schedule procedure

## 2020-02-24 ENCOUNTER — TELEPHONE (OUTPATIENT)
Dept: INTERNAL MEDICINE CLINIC | Facility: CLINIC | Age: 75
End: 2020-02-24

## 2020-02-24 DIAGNOSIS — I25.10 CORONARY ARTERY DISEASE INVOLVING NATIVE CORONARY ARTERY OF NATIVE HEART WITHOUT ANGINA PECTORIS: Primary | ICD-10-CM

## 2020-02-24 DIAGNOSIS — E78.5 HYPERLIPIDEMIA, UNSPECIFIED HYPERLIPIDEMIA TYPE: ICD-10-CM

## 2020-02-27 ENCOUNTER — LAB ENCOUNTER (OUTPATIENT)
Dept: LAB | Facility: HOSPITAL | Age: 75
End: 2020-02-27
Attending: NURSE PRACTITIONER
Payer: MEDICARE

## 2020-02-27 ENCOUNTER — OFFICE VISIT (OUTPATIENT)
Dept: PAIN CLINIC | Facility: HOSPITAL | Age: 75
End: 2020-02-27
Attending: NURSE PRACTITIONER
Payer: MEDICARE

## 2020-02-27 DIAGNOSIS — I25.10 CORONARY ARTERY DISEASE INVOLVING NATIVE CORONARY ARTERY OF NATIVE HEART WITHOUT ANGINA PECTORIS: ICD-10-CM

## 2020-02-27 DIAGNOSIS — E78.5 HYPERLIPIDEMIA, UNSPECIFIED HYPERLIPIDEMIA TYPE: ICD-10-CM

## 2020-02-27 DIAGNOSIS — M47.816 ARTHRITIS OF FACET JOINT OF LUMBAR SPINE: Primary | ICD-10-CM

## 2020-02-27 DIAGNOSIS — M48.061 FORAMINAL STENOSIS OF LUMBAR REGION: ICD-10-CM

## 2020-02-27 DIAGNOSIS — M51.36 DDD (DEGENERATIVE DISC DISEASE), LUMBAR: ICD-10-CM

## 2020-02-27 LAB
ABSOLUTE IMMATURE GRANULOCYTES (OFFPRE24): NORMAL
ALBUMIN SERPL-MCNC: 3.5 G/DL
ALBUMIN SERPL-MCNC: 3.5 G/DL (ref 3.4–5)
ALBUMIN/GLOB SERPL: 1 {RATIO} (ref 1–2)
ALBUMIN/GLOB SERPL: NORMAL {RATIO}
ALP LIVER SERPL-CCNC: 119 U/L (ref 45–117)
ALP SERPL-CCNC: 119 U/L
ALT SERPL-CCNC: 26 U/L
ALT SERPL-CCNC: 26 U/L (ref 16–61)
ANION GAP SERPL CALC-SCNC: 4 MMOL/L (ref 0–18)
ANION GAP SERPL CALC-SCNC: NORMAL MMOL/L
AST SERPL-CCNC: 15 U/L
AST SERPL-CCNC: 15 U/L (ref 15–37)
BACTERIA UR QL AUTO: NEGATIVE /HPF
BASO+EOS+MONOS # BLD: NORMAL 10*3/UL
BASO+EOS+MONOS NFR BLD: NORMAL %
BASOPHILS # BLD AUTO: 0.05 X10(3) UL (ref 0–0.2)
BASOPHILS # BLD: NORMAL 10*3/UL
BASOPHILS NFR BLD AUTO: 0.7 %
BASOPHILS NFR BLD: NORMAL %
BILIRUB SERPL-MCNC: 0.8 MG/DL
BILIRUB SERPL-MCNC: 0.8 MG/DL (ref 0.1–2)
BILIRUB UR QL: NEGATIVE
BUN BLD-MCNC: 27 MG/DL (ref 7–18)
BUN SERPL-MCNC: 27 MG/DL
BUN/CREAT SERPL: 24.8 (ref 10–20)
BUN/CREAT SERPL: NORMAL
CALCIUM BLD-MCNC: 9.3 MG/DL (ref 8.5–10.1)
CALCIUM SERPL-MCNC: 9.3 MG/DL
CHLORIDE SERPL-SCNC: 108 MMOL/L
CHLORIDE SERPL-SCNC: 108 MMOL/L (ref 98–112)
CHOLEST SERPL-MCNC: 147 MG/DL
CHOLEST SMN-MCNC: 147 MG/DL (ref ?–200)
CHOLEST/HDLC SERPL: NORMAL {RATIO}
CK SERPL-CCNC: 66 U/L (ref 39–308)
CLARITY UR: CLEAR
CO2 SERPL-SCNC: 30 MMOL/L (ref 21–32)
CO2 SERPL-SCNC: NORMAL MMOL/L
COLOR UR: YELLOW
CREAT BLD-MCNC: 1.09 MG/DL (ref 0.7–1.3)
CREAT SERPL-MCNC: 1.09 MG/DL
DEPRECATED RDW RBC AUTO: 44.4 FL (ref 35.1–46.3)
DIFFERENTIAL METHOD BLD: NORMAL
EOSINOPHIL # BLD AUTO: 0.21 X10(3) UL (ref 0–0.7)
EOSINOPHIL # BLD: NORMAL 10*3/UL
EOSINOPHIL NFR BLD AUTO: 2.9 %
EOSINOPHIL NFR BLD: NORMAL %
ERYTHROCYTE [DISTWIDTH] IN BLOOD BY AUTOMATED COUNT: 13.7 % (ref 11–15)
ERYTHROCYTE [DISTWIDTH] IN BLOOD: NORMAL %
GLOBULIN PLAS-MCNC: 3.6 G/DL (ref 2.8–4.4)
GLOBULIN SER-MCNC: 3.6 G/DL
GLUCOSE BLD-MCNC: 85 MG/DL (ref 70–99)
GLUCOSE SERPL-MCNC: 85 MG/DL
GLUCOSE UR-MCNC: NEGATIVE MG/DL
HCT VFR BLD AUTO: 44.6 % (ref 39–53)
HCT VFR BLD CALC: 44.6 %
HDLC SERPL-MCNC: 62 MG/DL
HDLC SERPL-MCNC: 62 MG/DL (ref 40–59)
HGB BLD-MCNC: 14.6 G/DL
HGB BLD-MCNC: 14.6 G/DL (ref 13–17.5)
HGB UR QL STRIP.AUTO: NEGATIVE
IMM GRANULOCYTES # BLD AUTO: 0.05 X10(3) UL (ref 0–1)
IMM GRANULOCYTES NFR BLD: 0.7 %
IMMATURE GRANULOCYTES (OFFPRE25): NORMAL
KETONES UR-MCNC: NEGATIVE MG/DL
LDLC SERPL CALC-MCNC: 68 MG/DL
LDLC SERPL CALC-MCNC: 68 MG/DL (ref ?–100)
LENGTH OF FAST TIME PATIENT: NORMAL H
LENGTH OF FAST TIME PATIENT: YES H
LYMPHOCYTES # BLD AUTO: 1.22 X10(3) UL (ref 1–4)
LYMPHOCYTES # BLD: NORMAL 10*3/UL
LYMPHOCYTES NFR BLD AUTO: 17.1 %
LYMPHOCYTES NFR BLD: NORMAL %
M PROTEIN MFR SERPL ELPH: 7.1 G/DL (ref 6.4–8.2)
MCH RBC QN AUTO: 29.1 PG
MCH RBC QN AUTO: 29.1 PG (ref 26–34)
MCHC RBC AUTO-ENTMCNC: 32.7 G/DL
MCHC RBC AUTO-ENTMCNC: 32.7 G/DL (ref 31–37)
MCV RBC AUTO: 89 FL
MCV RBC AUTO: 89 FL (ref 80–100)
MONOCYTES # BLD AUTO: 0.87 X10(3) UL (ref 0.1–1)
MONOCYTES # BLD: NORMAL 10*3/UL
MONOCYTES NFR BLD AUTO: 12.2 %
MONOCYTES NFR BLD: NORMAL %
MPV (OFFPRE2): NORMAL
NEUTROPHILS # BLD AUTO: 4.73 X10 (3) UL (ref 1.5–7.7)
NEUTROPHILS # BLD AUTO: 4.73 X10(3) UL (ref 1.5–7.7)
NEUTROPHILS # BLD: NORMAL 10*3/UL
NEUTROPHILS NFR BLD AUTO: 66.4 %
NEUTROPHILS NFR BLD: NORMAL %
NITRITE UR QL STRIP.AUTO: NEGATIVE
NONHDLC SERPL-MCNC: 85 MG/DL
NONHDLC SERPL-MCNC: 85 MG/DL (ref ?–130)
NRBC BLD MANUAL-RTO: NORMAL %
OSMOLALITY SERPL CALC.SUM OF ELEC: 298 MOSM/KG (ref 275–295)
PATIENT FASTING Y/N/NP: YES
PATIENT FASTING Y/N/NP: YES
PH UR: 6 [PH] (ref 5–8)
PLAT MORPH BLD: NORMAL
PLATELET # BLD AUTO: 184 10(3)UL (ref 150–450)
PLATELET # BLD: 184 10*3/UL
POTASSIUM SERPL-SCNC: 4.2 MMOL/L
POTASSIUM SERPL-SCNC: 4.2 MMOL/L (ref 3.5–5.1)
PROT SERPL-MCNC: 7.1 G/DL
PROT UR-MCNC: NEGATIVE MG/DL
RBC # BLD AUTO: 5.01 X10(6)UL (ref 3.8–5.8)
RBC # BLD: 5.01 10*6/UL
RBC #/AREA URNS AUTO: <1 /HPF
RBC MORPH BLD: NORMAL
SODIUM SERPL-SCNC: 142 MMOL/L
SODIUM SERPL-SCNC: 142 MMOL/L (ref 136–145)
SP GR UR STRIP: 1.02 (ref 1–1.03)
TRIGL SERPL-MCNC: 87 MG/DL
TRIGL SERPL-MCNC: 87 MG/DL (ref 30–149)
TSI SER-ACNC: 1.94 MIU/ML (ref 0.36–3.74)
UROBILINOGEN UR STRIP-ACNC: <2
VLDLC SERPL CALC-MCNC: 17 MG/DL (ref 0–30)
VLDLC SERPL CALC-MCNC: NORMAL MG/DL
WBC # BLD AUTO: 7.1 X10(3) UL (ref 4–11)
WBC # BLD: 7.1 10*3/UL
WBC #/AREA URNS AUTO: 3 /HPF
WBC MORPH BLD: NORMAL

## 2020-02-27 PROCEDURE — 82550 ASSAY OF CK (CPK): CPT

## 2020-02-27 PROCEDURE — 85025 COMPLETE CBC W/AUTO DIFF WBC: CPT

## 2020-02-27 PROCEDURE — 99211 OFF/OP EST MAY X REQ PHY/QHP: CPT

## 2020-02-27 PROCEDURE — 36415 COLL VENOUS BLD VENIPUNCTURE: CPT

## 2020-02-27 PROCEDURE — 81001 URINALYSIS AUTO W/SCOPE: CPT | Performed by: INTERNAL MEDICINE

## 2020-02-27 PROCEDURE — 80061 LIPID PANEL: CPT

## 2020-02-27 PROCEDURE — 84443 ASSAY THYROID STIM HORMONE: CPT

## 2020-02-27 PROCEDURE — 80053 COMPREHEN METABOLIC PANEL: CPT

## 2020-02-27 RX ORDER — GABAPENTIN 300 MG/1
300 CAPSULE ORAL 3 TIMES DAILY
Qty: 90 CAPSULE | Refills: 1 | Status: SHIPPED | OUTPATIENT
Start: 2020-02-27 | End: 2020-04-23

## 2020-02-27 NOTE — PROGRESS NOTES
Pt presents to [facility] ambulatory for follow up after having a left transforaminal on 02/13/20 with Dr. Sami Pimentel. Pt feels about 70% improvement. Pt states only has pain when walking, pain radiates to left leg down to the bridge of the foot.  Pt is requesting fo

## 2020-02-27 NOTE — CHRONIC PAIN
[facility] Follow up     HISTORY OF PRESENT ILLNESS:  Erlinda Morales is a 75year old old male referred to the pain clinic by Dr. Javed ref.  provider found for Arthritis of facet joint of lumbar spine ([facility] [address]  (primary encounter diagnosis) procedure who recommended a fusion. Patient states that he does not want any surgery at this time. He was referred by Dr. Jacki Dukes to our clinic for other options.  Patient did see Dr. Susanne Flor orthopedic oncologist, for a left \"groin mass\" but patient s HISTORY:  Past Surgical History:   Procedure Laterality Date   • APPENDECTOMY  2009   • CABG  2003    single bypass LIMA graft to LAD   • CATARACT  04/2019    bilateral   • IR REPAIR AAA ENDOVASCULAR     • KNEE SURGERY Right 1972    cartilage removed   • O • Hypomagnesemia    • Low TSH level    • Low TSH level    • Lung disease     Asbestos related lung disease   • Nonspecific abnormal serum enzyme levels 4/25/2013   • Orthostatic hypotension    • Orthostatic hypotension    • Other and unspecified hyperli connections:        Talks on phone: Not on file        Gets together: Not on file        Attends Sabianism service: Not on file        Active member of club or organization: Not on file        Attends meetings of clubs or organizations: Not on file EXTREMITY      LEFT RIGHT   Iliopsoas 5/5 5/5   Quadriceps 5/5 5/5   Foot DF 5/5 5/5   Foot EHL 5/5 5/5   Gastrocnemius 5/5 5/5     IMAGING:  PROCEDURE:  MRI SPINE LUMBAR (FIW=[id number])     COMPARISON: [facility], CTA ABD/PEL (EYT=[id number]), 5/17 results in moderate central canal narrowing and moderate bilateral neural foraminal narrowing.   L3-L4:   Disk desiccation with bulging disk, facet, and ligamentous hypertrophy results in moderate central canal narrowing and moderate bilateral neural forami muscle or tendon tear.  No cartilaginous cap or associated soft tissues mass    LABS:  Lab Results   Component Value Date    WBC 7.1 02/27/2020    RBC 5.01 02/27/2020    HGB 14.6 02/27/2020    HCT 44.6 02/27/2020    MCV 89.0 02/27/2020    MCH 29.1 02/27/202 discussing the care with the patients health care providers.      Patient to call in one week or sooner if pain worsens

## 2020-02-28 ENCOUNTER — TELEPHONE (OUTPATIENT)
Dept: INTERNAL MEDICINE CLINIC | Facility: CLINIC | Age: 75
End: 2020-02-28

## 2020-02-28 NOTE — TELEPHONE ENCOUNTER
Spoke with Radha Jacobson to relay MD message below as Buzz Cowan was not at home (ok per [facility] verbal release); She will pass along message and instructions to increase water intake.

## 2020-02-28 NOTE — TELEPHONE ENCOUNTER
Please let patient know that I thought his labs came out fairly well. Cholesterol is in a good range. Blood count is at a good range. Urinalysis was good. There is some suggestion that he might not be drinking enough fluids.   If he is drinking at least

## 2020-03-10 ENCOUNTER — TELEPHONE (OUTPATIENT)
Dept: PAIN CLINIC | Facility: HOSPITAL | Age: 75
End: 2020-03-10

## 2020-03-10 NOTE — TELEPHONE ENCOUNTER
Spoke with pt, feels as though 2nd injection is needed. Last OV, if pt was not doing well ok to schedule for procedure. Scheduled for 04/01/2020 for procedure and follow up on 04/16/2020. Verbal instructions given to pt understanding verbalized.   No

## 2020-03-12 ENCOUNTER — LAB ENCOUNTER (OUTPATIENT)
Dept: LAB | Age: 75
End: 2020-03-12
Attending: INTERNAL MEDICINE
Payer: MEDICARE

## 2020-03-12 ENCOUNTER — OFFICE VISIT (OUTPATIENT)
Dept: INTERNAL MEDICINE CLINIC | Facility: CLINIC | Age: 75
End: 2020-03-12
Payer: MEDICARE

## 2020-03-12 VITALS
DIASTOLIC BLOOD PRESSURE: 64 MMHG | TEMPERATURE: 98 F | WEIGHT: 173 LBS | HEIGHT: 68 IN | BODY MASS INDEX: 26.22 KG/M2 | OXYGEN SATURATION: 98 % | SYSTOLIC BLOOD PRESSURE: 100 MMHG | HEART RATE: 67 BPM

## 2020-03-12 DIAGNOSIS — I71.4 ABDOMINAL AORTIC ANEURYSM (AAA) WITHOUT RUPTURE (HCC): ICD-10-CM

## 2020-03-12 DIAGNOSIS — I25.10 CORONARY ARTERY DISEASE INVOLVING NATIVE CORONARY ARTERY OF NATIVE HEART WITHOUT ANGINA PECTORIS: Primary | ICD-10-CM

## 2020-03-12 DIAGNOSIS — R41.3 MEMORY CHANGES: ICD-10-CM

## 2020-03-12 DIAGNOSIS — E78.5 HYPERLIPIDEMIA, UNSPECIFIED HYPERLIPIDEMIA TYPE: ICD-10-CM

## 2020-03-12 DIAGNOSIS — Z86.010 HISTORY OF ADENOMATOUS POLYP OF COLON: ICD-10-CM

## 2020-03-12 DIAGNOSIS — C02.9 TONGUE CANCER (HCC): ICD-10-CM

## 2020-03-12 DIAGNOSIS — Z23 FLU VACCINE NEED: ICD-10-CM

## 2020-03-12 DIAGNOSIS — F39 MOOD DISORDER (HCC): ICD-10-CM

## 2020-03-12 DIAGNOSIS — Z00.00 ROUTINE HEALTH MAINTENANCE: ICD-10-CM

## 2020-03-12 LAB
ANION GAP SERPL CALC-SCNC: 4 MMOL/L (ref 0–18)
ANION GAP SERPL CALC-SCNC: NORMAL MMOL/L
BUN BLD-MCNC: 25 MG/DL (ref 7–18)
BUN SERPL-MCNC: 25 MG/DL
BUN/CREAT SERPL: 22.9 (ref 10–20)
BUN/CREAT SERPL: NORMAL
CALCIUM BLD-MCNC: 9.3 MG/DL (ref 8.5–10.1)
CALCIUM SERPL-MCNC: 9.3 MG/DL
CHLORIDE SERPL-SCNC: 110 MMOL/L
CHLORIDE SERPL-SCNC: 110 MMOL/L (ref 98–112)
CO2 SERPL-SCNC: 30 MMOL/L (ref 21–32)
CO2 SERPL-SCNC: NORMAL MMOL/L
CREAT BLD-MCNC: 1.09 MG/DL (ref 0.7–1.3)
CREAT SERPL-MCNC: 1.09 MG/DL
GLUCOSE BLD-MCNC: 80 MG/DL (ref 70–99)
GLUCOSE SERPL-MCNC: 80 MG/DL
LENGTH OF FAST TIME PATIENT: NORMAL H
OSMOLALITY SERPL CALC.SUM OF ELEC: 301 MOSM/KG (ref 275–295)
PATIENT FASTING Y/N/NP: NO
POTASSIUM SERPL-SCNC: 3.8 MMOL/L
POTASSIUM SERPL-SCNC: 3.8 MMOL/L (ref 3.5–5.1)
SODIUM SERPL-SCNC: 144 MMOL/L
SODIUM SERPL-SCNC: 144 MMOL/L (ref 136–145)
TSH SERPL-ACNC: 1.88 M[IU]/L
TSI SER-ACNC: 1.88 MIU/ML (ref 0.36–3.74)
VIT B12 SERPL-MCNC: 706 PG/ML (ref 193–986)

## 2020-03-12 PROCEDURE — 80048 BASIC METABOLIC PNL TOTAL CA: CPT

## 2020-03-12 PROCEDURE — 84443 ASSAY THYROID STIM HORMONE: CPT

## 2020-03-12 PROCEDURE — 99214 OFFICE O/P EST MOD 30 MIN: CPT | Performed by: INTERNAL MEDICINE

## 2020-03-12 PROCEDURE — G0008 ADMIN INFLUENZA VIRUS VAC: HCPCS | Performed by: INTERNAL MEDICINE

## 2020-03-12 PROCEDURE — 36415 COLL VENOUS BLD VENIPUNCTURE: CPT

## 2020-03-12 PROCEDURE — 82607 VITAMIN B-12: CPT

## 2020-03-12 PROCEDURE — 90662 IIV NO PRSV INCREASED AG IM: CPT | Performed by: INTERNAL MEDICINE

## 2020-03-12 NOTE — PROGRESS NOTES
Yuli Stubbs is a 75year old male   HPI:   Pt.presents for the following problems. Patient presents with no acute complaints. However he is struggling with lower back pain, left buttock pain and radiation down to his left leg.   He feels a \"t be.    Wife brought up when I saw her during her visit that patient's mood seems to fluctuate. Also he sometimes \"stares\" but then is redirected immediately. Not consistent with seizure. Also he seems to have some memory problems.   Patient does not fe insipidus ([facility] [address]    • Diabetes insipidus (HCC)    • Elevated PSA    • Glaucoma     slight glaucoma in one eye   • Hiatal hernia    • Hypomagnesemia    • Low TSH level    • Low TSH level    • Lung disease     Asbestos related lung disease   • Nonspecific abn vision or eye pain  HEENT: Voices no nasal congestion, sinus pain or sore throat  LUNGS:  Voices no shortness of breath or cough  CARDIOVASCULAR :  Voices no chest pain or palpitations  GI:  Voices no abdominal pain, blood in stool or changes in bowel move     5. History of adenomatous polyp of colon  Patient last had colonoscopy December 2016. Per Dr. Rachana Espinosa next colonoscopy 3 years after that I 2019. 6. Routine health maintenance  Patient will get flu vaccine today.   He does know about Kristine Kelly

## 2020-03-17 ENCOUNTER — TELEPHONE (OUTPATIENT)
Dept: CARDIOLOGY | Age: 75
End: 2020-03-17

## 2020-03-17 RX ORDER — GABAPENTIN 300 MG/1
300 CAPSULE ORAL 3 TIMES DAILY
COMMUNITY
End: 2021-08-30 | Stop reason: ALTCHOICE

## 2020-03-20 ENCOUNTER — OFFICE VISIT (OUTPATIENT)
Dept: CARDIOLOGY | Age: 75
End: 2020-03-20

## 2020-03-20 VITALS
TEMPERATURE: 97 F | DIASTOLIC BLOOD PRESSURE: 66 MMHG | HEIGHT: 68 IN | SYSTOLIC BLOOD PRESSURE: 123 MMHG | BODY MASS INDEX: 25.61 KG/M2 | HEART RATE: 72 BPM | WEIGHT: 169 LBS

## 2020-03-20 DIAGNOSIS — E78.5 DYSLIPIDEMIA: ICD-10-CM

## 2020-03-20 DIAGNOSIS — I25.10 CORONARY ARTERY DISEASE INVOLVING NATIVE CORONARY ARTERY OF NATIVE HEART WITHOUT ANGINA PECTORIS: Primary | ICD-10-CM

## 2020-03-20 PROCEDURE — 99442 TELEPHONE E&M BY PHYSICIAN EST PT NOT ORIG PREV 7 DAYS 11-20 MIN: CPT | Performed by: INTERNAL MEDICINE

## 2020-03-20 ASSESSMENT — PATIENT HEALTH QUESTIONNAIRE - PHQ9
2. FEELING DOWN, DEPRESSED OR HOPELESS: NOT AT ALL
SUM OF ALL RESPONSES TO PHQ9 QUESTIONS 1 AND 2: 0
SUM OF ALL RESPONSES TO PHQ9 QUESTIONS 1 AND 2: 0
1. LITTLE INTEREST OR PLEASURE IN DOING THINGS: NOT AT ALL

## 2020-04-08 RX ORDER — ATORVASTATIN CALCIUM 40 MG/1
TABLET, FILM COATED ORAL
Qty: 90 TABLET | Refills: 3 | Status: SHIPPED | OUTPATIENT
Start: 2020-04-08 | End: 2021-03-26

## 2020-04-23 RX ORDER — GABAPENTIN 300 MG/1
CAPSULE ORAL
Qty: 90 CAPSULE | Refills: 1 | Status: SHIPPED | OUTPATIENT
Start: 2020-04-23 | End: 2020-07-10

## 2020-04-29 DIAGNOSIS — I71.4 AAA (ABDOMINAL AORTIC ANEURYSM) (HCC): Primary | ICD-10-CM

## 2020-05-09 ENCOUNTER — HOSPITAL ENCOUNTER (OUTPATIENT)
Dept: CT IMAGING | Facility: HOSPITAL | Age: 75
Discharge: HOME | End: 2020-05-09
Attending: RADIOLOGY
Payer: MEDICARE

## 2020-05-09 DIAGNOSIS — I71.4 AAA (ABDOMINAL AORTIC ANEURYSM) (HCC): ICD-10-CM

## 2020-05-09 PROCEDURE — 74174 CTA ABD&PLVS W/CONTRAST: CPT | Performed by: RADIOLOGY

## 2020-05-09 PROCEDURE — 82565 ASSAY OF CREATININE: CPT

## 2020-05-14 NOTE — PROGRESS NOTES
F/u CT, now 5 years s/p endovascular AAA repair, with excellent result. No residual endoleak, and aneurysm sac size decreased to < 3cm. Discussed result c patient. Given stability, we will decrease f/u CT surveillance to q 2 years.

## 2020-05-28 ENCOUNTER — TELEPHONE (OUTPATIENT)
Dept: INTERNAL MEDICINE CLINIC | Facility: CLINIC | Age: 75
End: 2020-05-28

## 2020-05-28 DIAGNOSIS — Z20.822 EXPOSURE TO COVID-19 VIRUS: Primary | ICD-10-CM

## 2020-06-01 ENCOUNTER — LAB REQUISITION (OUTPATIENT)
Dept: LAB | Facility: HOSPITAL | Age: 75
End: 2020-06-01
Payer: MEDICARE

## 2020-06-01 ENCOUNTER — DOCUMENTATION ONLY (OUTPATIENT)
Dept: PAIN CLINIC | Facility: HOSPITAL | Age: 75
End: 2020-06-01

## 2020-06-01 DIAGNOSIS — Z20.828 CONTACT WITH AND (SUSPECTED) EXPOSURE TO OTHER VIRAL COMMUNICABLE DISEASES: ICD-10-CM

## 2020-06-01 NOTE — PROGRESS NOTES
Procedure code 54524--46/02/20    Auth needed via [employer] #  [id number]    Valid from 05/28/2020----08/26/20    Order form faxed to the surgery center, confirmation rcv'd

## 2020-06-05 ENCOUNTER — TELEPHONE (OUTPATIENT)
Dept: INTERNAL MEDICINE CLINIC | Facility: CLINIC | Age: 75
End: 2020-06-05

## 2020-06-05 NOTE — TELEPHONE ENCOUNTER
Spoke to patient's wife and relayed MD message. Patient's wife verbalized understanding and stated she will relay MD message to patient.

## 2020-06-05 NOTE — TELEPHONE ENCOUNTER
Please let patient know that his COVID 19 serology test was good. No evidence of an antibiotic. There is another message for his wife also. Bcc  Nodular Histology Text: The dermis contains distinctive tumor cell masses of various shapes and sizes composed of cells with large oval or elongated nuclei with relatively little cytoplasm.  The nuclei are homogenous.  There is no pronounced variation in size or intensity of staining and no significant anaplastic appearance.  The cells at the outer aspect of the tumor masses show palisading of nuclei.  Tumor masses are surrounded by a connective tissue stroma and in some areas there is retraction artifact of the tumor nodule away from the surrounding stroma.  A nodular histology is noted.

## 2020-06-19 ENCOUNTER — OFFICE VISIT (OUTPATIENT)
Dept: PAIN CLINIC | Facility: HOSPITAL | Age: 75
End: 2020-06-19
Attending: NURSE PRACTITIONER
Payer: MEDICARE

## 2020-06-19 DIAGNOSIS — M51.36 DDD (DEGENERATIVE DISC DISEASE), LUMBAR: ICD-10-CM

## 2020-06-19 DIAGNOSIS — M47.816 ARTHRITIS OF FACET JOINT OF LUMBAR SPINE: Primary | ICD-10-CM

## 2020-06-19 DIAGNOSIS — M48.061 FORAMINAL STENOSIS OF LUMBAR REGION: ICD-10-CM

## 2020-06-19 PROCEDURE — 99211 OFF/OP EST MAY X REQ PHY/QHP: CPT

## 2020-06-19 NOTE — PROGRESS NOTES
Pt presents  Ambulatory for follow up after having a left trans on 06/2/20 with Dr. Lyric Springer. Pt states having 75% improvement for 10 days. Pt states pain returned gradually and feels back at baseline.  Pt is using gabepentin and tylenol with little reli

## 2020-06-19 NOTE — CHRONIC PAIN
[facility] Follow up     HISTORY OF PRESENT ILLNESS:  Sorin Roche is a 75year old old male referred to the pain clinic by Dr. Javed ref.  provider found for Arthritis of facet joint of lumbar spine ([facility] [address]  (primary encounter diagnosis) procedure who recommended a fusion. Patient states that he does not want any surgery at this time. He was referred by Dr. Paul Dai to our clinic for other options.  Patient did see Dr. Quinten Ferrell orthopedic oncologist, for a left \"groin mass\" but patient s Vitamins-Minerals (ICAPS AREDS FORMULA) Oral Tab Take 1 tablet by mouth daily. • aspirin 81 MG Oral Tab Take 81 mg by mouth daily. • Vitamin C (VITAMIN C) 500 MG Oral Tab Take 500 mg by mouth daily.      • Multiple Vitamins-Minerals (MENS MULTIVITAM obstructive pulmonary disease) (HCC)     Arthritis of facet joint of lumbar spine (HCC)     Numbness and tingling of both legs     DDD (degenerative disc disease), lumbar     Foraminal stenosis of lumbar region    Past Medical History:   Diagnosis Date   • file    Tobacco Use      Smoking status: Former Smoker        Packs/day: 0.75        Years: 25.00        Pack years: 18.75        Quit date: 6/15/2003        Years since quittin.0      Smokeless tobacco: Never Used    Substance and Sexual Activity Normal  TPs:  Absent within lumbo-sacral paraspinal muscles    Skin - normal     Temperature:  normal to touch bilateral upper and lower extremities  Edema - Absent  Sensation (light touch/pinprick/temperature):     Right Lower Extremity:  Normal  Left Low OTHER:             There is S-shaped scoliosis of the thoracolumbar spine.  There is mild retrolisthesis of L4 on L5 There is mild retrolisthesis of L5 on S1.     LUMBAR DISC LEVELS:  L1-L2:   Disk desiccation with bulging disk, facet, and ligamentous hy Dictated by (CST): Lanny Rubio MD on 11/26/2019 at 8:04       Approved by (CST): Lanny Rubio MD on 11/26/2019 at 8:10       MR left hip joint with and without contrast   1.  Large exophytic osseus protuberance extending anteriorly from the ant discussed at length to patients satisfaction during a comprehensive interactive discussion. All questions were answered during extended questions and answer session. Patient agreeable to discussion plan.  Greater than 50% of the time was spent with counseli

## 2020-07-10 ENCOUNTER — TELEPHONE (OUTPATIENT)
Dept: PAIN CLINIC | Facility: HOSPITAL | Age: 75
End: 2020-07-10

## 2020-07-10 NOTE — TELEPHONE ENCOUNTER
PT stated his refill for gabapentin was denied and he would like to know what to do to get it approved.

## 2020-07-13 RX ORDER — GABAPENTIN 300 MG/1
300 CAPSULE ORAL 3 TIMES DAILY
Qty: 90 CAPSULE | Refills: 2 | OUTPATIENT
Start: 2020-07-13 | End: 2020-08-12

## 2020-07-13 RX ORDER — GABAPENTIN 300 MG/1
CAPSULE ORAL
Qty: 90 CAPSULE | Refills: 2 | OUTPATIENT
Start: 2020-07-13 | End: 2020-09-08

## 2020-07-24 ENCOUNTER — TELEPHONE (OUTPATIENT)
Dept: PAIN CLINIC | Facility: HOSPITAL | Age: 75
End: 2020-07-24

## 2020-07-24 NOTE — TELEPHONE ENCOUNTER
PT stated that Dr. Delilah Edward had submitted documentation and would like to know what his next steps are.

## 2020-08-04 ENCOUNTER — TELEPHONE (OUTPATIENT)
Dept: CASE MANAGEMENT | Age: 75
End: 2020-08-04

## 2020-08-04 NOTE — TELEPHONE ENCOUNTER
Patient is eligible for a 2020 Medicare Advantage Supervisit. Discussed in detail w/patient. Appt scheduled for 9/18/20.

## 2020-08-06 ENCOUNTER — OFFICE VISIT (OUTPATIENT)
Dept: PAIN CLINIC | Facility: HOSPITAL | Age: 75
End: 2020-08-06
Attending: ANESTHESIOLOGY
Payer: MEDICARE

## 2020-08-06 VITALS
HEIGHT: 68 IN | DIASTOLIC BLOOD PRESSURE: 78 MMHG | HEART RATE: 70 BPM | BODY MASS INDEX: 25.46 KG/M2 | SYSTOLIC BLOOD PRESSURE: 96 MMHG | RESPIRATION RATE: 18 BRPM | WEIGHT: 168 LBS

## 2020-08-06 DIAGNOSIS — M51.36 DDD (DEGENERATIVE DISC DISEASE), LUMBAR: Primary | ICD-10-CM

## 2020-08-06 DIAGNOSIS — M48.061 FORAMINAL STENOSIS OF LUMBAR REGION: ICD-10-CM

## 2020-08-06 DIAGNOSIS — M47.816 ARTHRITIS OF FACET JOINT OF LUMBAR SPINE: ICD-10-CM

## 2020-08-06 PROCEDURE — 99211 OFF/OP EST MAY X REQ PHY/QHP: CPT

## 2020-08-06 NOTE — PROGRESS NOTES
8/6/2020-presents ambulatory to CPM;  Pt hx of several yr of chronic back pain;  Has had injection therapy with minimal relief;  Reviewed info r/t SCS; Here today to finalize with MD SCS TRIAL; pt has completed the [facility].  [place] with Dr. Kimberly Tai;   Rates hi

## 2020-08-06 NOTE — CHRONIC PAIN
[place] [facility] Follow up     HISTORY OF PRESENT ILLNESS:  Alexia Wang is a 75year old old male referred to the pain clinic by Dr. Javed ref.  provider found for DDD (degenerative disc disease), lumbar  (primary encounter diagnosis)  Lita Toth procedure who recommended a fusion. Patient states that he does not want any surgery at this time. He was referred by Dr. Paul Dai to our clinic for other options.  Patient did see Dr. Quinten Ferrell orthopedic oncologist, for a left \"groin mass\" but patient s CAPSULE(300 MG) BY MOUTH THREE TIMES DAILY 90 capsule 2   • atorvastatin 40 MG Oral Tab Take 40 mg by mouth daily. • Multiple Vitamins-Minerals (ICAPS AREDS FORMULA) Oral Tab Take 1 tablet by mouth daily.      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)     Numbness and tingling of both legs Worry: Not on file        Inability: Not on file      Transportation needs:        Medical: Not on file        Non-medical: Not on file    Tobacco Use      Smoking status: Former Smoker        Packs/day: 0.75        Years: 25.00        Pack years: 18.75 Height: 5' 8\" (1.727 m)     General: Alert and oriented x3  Affect:  NAD  Head: normocephalic, atraumatic  Eyes: anicteric; no injection  Joint Exam: Normal    Gait: Normal; cane user - No  Spine: Normal  TPs:  Absent within lumbo-sacral paraspinal musc Remainder of the osseous   structures are otherwise intact without acute fracture, dislocation, or marrow replacing lesion. CORD/CAUDA EQUINA:            Normal caliber, contour, and signal intensity.     OTHER:             There is S-shaped scoliosis of t most prominent at L4-L5, where there is severe narrowing of the left neural foramen with bony encroachment upon the exiting left L4 nerve root. Severe narrowing of the right neural foramen at L5-S1.              Dictated by (CST): Erick Kimball MD on 11/ further testing. Risks and benefits of all options were discussed at length to patients satisfaction during a comprehensive interactive discussion. All questions were answered during extended questions and answer session.  Patient agreeable to discussion pl

## 2020-08-31 ENCOUNTER — DOCUMENTATION ONLY (OUTPATIENT)
Dept: PAIN CLINIC | Facility: HOSPITAL | Age: 75
End: 2020-08-31

## 2020-08-31 NOTE — PROGRESS NOTES
Procedure code 6350--09/8/20 APPORVED    [facility]--SAI serra'd via [employer] with [employer]. Writer spoke with pt. Scheduled ofr 09/8/20 with Dr. Cabrera Earing w/a lead pull scheduled for 9/14/20.  Verbal instructions given to pt, verbalized understanding no

## 2020-09-05 ENCOUNTER — LAB REQUISITION (OUTPATIENT)
Dept: LAB | Facility: HOSPITAL | Age: 75
End: 2020-09-05
Payer: MEDICARE

## 2020-09-05 DIAGNOSIS — Z20.828 CONTACT WITH AND (SUSPECTED) EXPOSURE TO OTHER VIRAL COMMUNICABLE DISEASES: ICD-10-CM

## 2020-09-06 LAB — SARS-COV-2 RNA RESP QL NAA+PROBE: NOT DETECTED

## 2020-09-08 RX ORDER — GABAPENTIN 300 MG/1
CAPSULE ORAL
Qty: 90 CAPSULE | Refills: 2 | Status: SHIPPED | OUTPATIENT
Start: 2020-09-08 | End: 2020-12-10

## 2020-09-12 ENCOUNTER — TELEPHONE (OUTPATIENT)
Dept: PAIN CLINIC | Facility: HOSPITAL | Age: 75
End: 2020-09-12

## 2020-09-12 NOTE — TELEPHONE ENCOUNTER
Follow up call due to pt relating to SCS rep of low grade fever    Pt called states had stuffy nose and slight soar throat yesterday with temp of 99.7   Took tylenol  Felt like common cold   Today feels better no fever just scratchy throat and stuffy nose

## 2020-09-14 ENCOUNTER — OFFICE VISIT (OUTPATIENT)
Dept: PAIN CLINIC | Facility: HOSPITAL | Age: 75
End: 2020-09-14
Attending: NURSE PRACTITIONER
Payer: MEDICARE

## 2020-09-14 DIAGNOSIS — M51.36 DDD (DEGENERATIVE DISC DISEASE), LUMBAR: Primary | ICD-10-CM

## 2020-09-14 DIAGNOSIS — M48.061 FORAMINAL STENOSIS OF LUMBAR REGION: ICD-10-CM

## 2020-09-14 PROCEDURE — 99212 OFFICE O/P EST SF 10 MIN: CPT

## 2020-09-14 NOTE — CHRONIC PAIN
[place] [facility] Follow up     HISTORY OF PRESENT ILLNESS:  Milagro Mccarthy is a 75year old old male referred to the pain clinic by Dr. Javed ref.  provider found for DDD (degenerative disc disease), lumbar  (primary encounter diagnosis)  Patti Cabral that he does not want any surgery at this time. He was referred by Dr. Layne Rivera to our clinic for other options. Patient did see Dr. Tiburcio Cabral orthopedic oncologist, for a left \"groin mass\" but patient states that no further workup was needed.    2/27/20: he would have to wait for the pain to slowly resolve. He states she was about to walk around the grocery store yesterday and did not need to rush to get done.  He was also able to walk around his block and felt that he could walk more but his right knee lizbeth REPAIR [facility]     • KNEE SURGERY Right 1972    cartilage removed   • OTHER SURGICAL HISTORY  8/2014    Modified Neck Dissection   • OTHER SURGICAL HISTORY  8/2014    Excision of Tongue Lesion   • OTHER SURGICAL HISTORY      repair abdominal aneur Orthostatic hypotension    • Orthostatic hypotension    • Other and unspecified hyperlipidemia    • PEG (percutaneous endoscopic gastrostomy) status (HCC)    • Pleural plaque     calcified pleural plaques   • Pleurisy 4/13/2012   • [facility] Not on file        Attends meetings of clubs or organizations: Not on file        Relationship status: Not on file      Intimate partner violence:        Fear of current or ex partner: Not on file        Emotionally abused: Not on file        Physically ab and parameters were utilized for visualization of suspected pathology. FINDINGS:             Counting Reference: Lumbosacral junction. For the purposes of this exam, it will be assumed that there are 5 lumbar-type vertebral bodies.  L4-L5 is at the central canal narrowing and moderate right and severe left neural foraminal narrowing. There is bony encroachment upon the exiting left L4 nerve root. There   is severe narrowing of the bilateral subarticular and lateral recesses.   L5-S1:   Disk desiccatio 03/12/2020    CO2 30.0 03/12/2020    BUN 25 (H) 03/12/2020    GLU 80 03/12/2020    CA 9.3 03/12/2020     No results found for: PT    [place] PHYSICIAN MONITORING PROGRAM REVIEWED  Yes      ASSESSMENT AND PLAN:    75year old old male with left leg radicul

## 2020-09-14 NOTE — PROGRESS NOTES
Pt presents to [facility] ambulatory for lead removal after having a trial with the SCS on 9/8/20. Pt states it was 50% improvement. PT is still having left hip pain and requires to take Tylenol for the pain. Pt states overall satisfied with results of SCS.  Rep f

## 2020-09-18 ENCOUNTER — OFFICE VISIT (OUTPATIENT)
Dept: INTERNAL MEDICINE CLINIC | Facility: CLINIC | Age: 75
End: 2020-09-18
Payer: MEDICARE

## 2020-09-18 VITALS
WEIGHT: 168 LBS | HEART RATE: 67 BPM | TEMPERATURE: 97 F | DIASTOLIC BLOOD PRESSURE: 64 MMHG | HEIGHT: 68 IN | OXYGEN SATURATION: 99 % | BODY MASS INDEX: 25.46 KG/M2 | SYSTOLIC BLOOD PRESSURE: 104 MMHG

## 2020-09-18 DIAGNOSIS — E78.5 HYPERLIPIDEMIA, UNSPECIFIED HYPERLIPIDEMIA TYPE: ICD-10-CM

## 2020-09-18 DIAGNOSIS — M80.00XA AGE-RELATED OSTEOPOROSIS WITH CURRENT PATHOLOGICAL FRACTURE, INITIAL ENCOUNTER: ICD-10-CM

## 2020-09-18 DIAGNOSIS — C02.9 TONGUE CANCER (HCC): ICD-10-CM

## 2020-09-18 DIAGNOSIS — Z23 FLU VACCINE NEED: ICD-10-CM

## 2020-09-18 DIAGNOSIS — S32.000A COMPRESSION FRACTURE OF LUMBAR VERTEBRA, INITIAL ENCOUNTER, UNSPECIFIED LUMBAR VERTEBRAL LEVEL: ICD-10-CM

## 2020-09-18 DIAGNOSIS — Z00.00 ROUTINE HEALTH MAINTENANCE: ICD-10-CM

## 2020-09-18 DIAGNOSIS — Z86.010 HISTORY OF ADENOMATOUS POLYP OF COLON: ICD-10-CM

## 2020-09-18 DIAGNOSIS — Z00.00 MEDICARE ANNUAL WELLNESS VISIT, SUBSEQUENT: Primary | ICD-10-CM

## 2020-09-18 DIAGNOSIS — I25.10 CORONARY ARTERY DISEASE INVOLVING NATIVE CORONARY ARTERY OF NATIVE HEART WITHOUT ANGINA PECTORIS: ICD-10-CM

## 2020-09-18 DIAGNOSIS — I71.4 ABDOMINAL AORTIC ANEURYSM (AAA) WITHOUT RUPTURE (HCC): ICD-10-CM

## 2020-09-18 PROCEDURE — 3008F BODY MASS INDEX DOCD: CPT | Performed by: INTERNAL MEDICINE

## 2020-09-18 PROCEDURE — 99397 PER PM REEVAL EST PAT 65+ YR: CPT | Performed by: INTERNAL MEDICINE

## 2020-09-18 PROCEDURE — G0439 PPPS, SUBSEQ VISIT: HCPCS | Performed by: INTERNAL MEDICINE

## 2020-09-18 PROCEDURE — 96160 PT-FOCUSED HLTH RISK ASSMT: CPT | Performed by: INTERNAL MEDICINE

## 2020-09-18 PROCEDURE — 3078F DIAST BP <80 MM HG: CPT | Performed by: INTERNAL MEDICINE

## 2020-09-18 PROCEDURE — 3074F SYST BP LT 130 MM HG: CPT | Performed by: INTERNAL MEDICINE

## 2020-09-18 PROCEDURE — G0008 ADMIN INFLUENZA VIRUS VAC: HCPCS | Performed by: INTERNAL MEDICINE

## 2020-09-18 PROCEDURE — 90662 IIV NO PRSV INCREASED AG IM: CPT | Performed by: INTERNAL MEDICINE

## 2020-09-18 NOTE — PROGRESS NOTES
Orion Multani is a 75year old male. HPI:   Patient presents with:  Physical: Medicare Annual Physical      Patient here for Medicare annual wellness    Patient has a history of coronary disease. Status post CABG.   He will be getting a stress test has taken some falls. Patient has a follow-up with his oncologist for his history of tongue cancer in November. He no longer has any follow-ups with radiation oncology or ENT.   Is asymptomatic  Current Outpatient Medications   Medication Sig Dispense R OF SYSTEMS:   GENERAL HEALTH:  feels well otherwise  RESPIRATORY:  Voices no shortness of breath with exertion or cough  CARDIOVASCULAR:  Voices no chest pain on exertion or shortness of breath  GI:   Voices no abdominal pain or changes of bowels   :Viic Problems?: No     Functional Status     Hearing Problems?: No    Vision Problems? : No    Difficulty walking?: No    Difficulty dressing or bathing?: No    Problems with daily activities? : No    Memory Problems?: No      Fall/Risk Assessment     Do you ha don't speak clearly):  No People get annoyed because I misunderstand what they say:  No   I misunderstand what others are saying and make inappropriate responses:  No I avoid social activities because I cannot hear well and fear I will reply improperly:  N orders found for this or any previous visit.  Update Immunization Activity if applicable    Pneumococcal Orders placed or performed in visit on 02/23/16   • PNEUMOCOCCAL IMMUNIZATION   Orders placed or performed in visit on 01/19/15   • PNEUMOCOCCAL VACC, 1 hyperlipidemia type  Patient with hyperlipidemia. Lipid order in place.  - LIPID PANEL; Future    4. Tongue cancer [facility])  Patient with history of tongue cancer.   He has a follow-up this fall in November with his oncologist.  He does not seem to have any s

## 2020-09-21 ENCOUNTER — OFFICE VISIT (OUTPATIENT)
Dept: PAIN CLINIC | Facility: HOSPITAL | Age: 75
End: 2020-09-21
Attending: ANESTHESIOLOGY
Payer: MEDICARE

## 2020-09-21 ENCOUNTER — TELEPHONE (OUTPATIENT)
Dept: INTERNAL MEDICINE CLINIC | Facility: CLINIC | Age: 75
End: 2020-09-21

## 2020-09-21 VITALS
HEART RATE: 67 BPM | HEIGHT: 68 IN | DIASTOLIC BLOOD PRESSURE: 65 MMHG | WEIGHT: 168 LBS | SYSTOLIC BLOOD PRESSURE: 116 MMHG | RESPIRATION RATE: 18 BRPM | BODY MASS INDEX: 25.46 KG/M2

## 2020-09-21 DIAGNOSIS — M47.816 ARTHRITIS OF FACET JOINT OF LUMBAR SPINE: ICD-10-CM

## 2020-09-21 DIAGNOSIS — M48.061 FORAMINAL STENOSIS OF LUMBAR REGION: ICD-10-CM

## 2020-09-21 DIAGNOSIS — M51.36 DDD (DEGENERATIVE DISC DISEASE), LUMBAR: Primary | ICD-10-CM

## 2020-09-21 DIAGNOSIS — S32.000S COMPRESSION FRACTURE OF LUMBAR VERTEBRA, UNSPECIFIED LUMBAR VERTEBRAL LEVEL, SEQUELA: ICD-10-CM

## 2020-09-21 PROCEDURE — 99211 OFF/OP EST MAY X REQ PHY/QHP: CPT

## 2020-09-21 NOTE — PROGRESS NOTES
9/21/2020  Presents ambulatory to CPM;  Accompanied by his wife; F?U POST SCS TRIAL-without the SCS;  Pt report a noticeable difference without the SCS; His pain has increased-prior 3-4, now 7/10;   Is definitely interested in pursuing  the PERMANENT SCS

## 2020-09-21 NOTE — CHRONIC PAIN
[place] [facility] Follow up     HISTORY OF PRESENT ILLNESS:  Julianna Moreno is a 75year old old male referred to the pain clinic by Dr. Javed ref.  provider found for DDD (degenerative disc disease), lumbar  (primary encounter diagnosis)  Daily Heck procedure. Pain then returned to baseline  He followed up with Dr. Ivone Cleaning following the procedure who recommended a fusion. Patient states that he does not want any surgery at this time. He was referred by Dr. Ivone Cleaning to our clinic for other options.  Keli left hip pain. He states he would \"crank up the stimulator\" and would obtain relief shortly after. He states when this would happen pre trial he would have to wait for the pain to slowly resolve.  He states she was about to walk around the grocery store y aspirin 81 MG Oral Tab Take 81 mg by mouth daily. • Vitamin C (VITAMIN C) 500 MG Oral Tab Take 500 mg by mouth daily. • Multiple Vitamins-Minerals (MENS MULTIVITAMIN PLUS) Oral Tab Take 1 tablet by mouth daily.      • Calcium Carb-Cholecalciferol (C ([facility]     Numbness and tingling of both legs     DDD (degenerative disc disease), lumbar     Foraminal stenosis of lumbar region    Past Medical History:   Diagnosis Date   • AAA (abdominal aortic aneurysm) ([facility]    • Appendicitis    • ASHD (arteriosclerot Years: 25.00        Pack years: 18.75        Quit date: 6/15/2003        Years since quittin.2      Smokeless tobacco: Never Used    Substance and Sexual Activity      Alcohol use: No        Alcohol/week: 0.0 standard drinks      Drug use: No      Sex within lumbo-sacral paraspinal muscles    Skin - normal     Temperature:  normal to touch bilateral upper and lower extremities  Edema - Absent  Sensation (light touch/pinprick/temperature):     Right Lower Extremity:  Normal  Left Lower Extremity: Normal disk, facet, and ligamentous hypertrophy results in moderate central canal narrowing and moderate bilateral neural foraminal narrowing.   L2-L3:   Disk desiccation with bulging disk, facet, and ligamentous hypertrophy results in moderate central canal narro extending anteriorly from the anterior inferior iliac spine and encompassing the origin of the left rectus femoris suggestive of robust heterotopic ossification or enthesopathy. No surrounding edema and no evidence for muscle or tendon tear.  No cartilagino medications, oral steroids, analgesics), injections, and further testing. Risks and benefits of all options were discussed at length to patients satisfaction during a comprehensive interactive discussion.  All questions were answered during extended questio

## 2020-09-22 NOTE — TELEPHONE ENCOUNTER
Please call patient and let him know that he can if he wishes schedule his DEXA scan before the spinal stimulator. However Dr. Angeles Diehl called me today indicating that he can also do it after the spinal stimulator.   This way I can make sure he is not deve

## 2020-09-22 NOTE — TELEPHONE ENCOUNTER
Called and Relayed MD's message to patient---verbalized understanding  She will have dexa scan done, after the spinal stimulator.

## 2020-09-23 ENCOUNTER — LAB ENCOUNTER (OUTPATIENT)
Dept: LAB | Age: 75
End: 2020-09-23
Attending: INTERNAL MEDICINE
Payer: MEDICARE

## 2020-09-23 DIAGNOSIS — M80.00XA AGE-RELATED OSTEOPOROSIS WITH CURRENT PATHOLOGICAL FRACTURE, INITIAL ENCOUNTER: ICD-10-CM

## 2020-09-23 DIAGNOSIS — E78.5 HYPERLIPIDEMIA, UNSPECIFIED HYPERLIPIDEMIA TYPE: ICD-10-CM

## 2020-09-23 LAB
ALBUMIN SERPL-MCNC: 3.3 G/DL (ref 3.4–5)
ALBUMIN/GLOB SERPL: 0.8 {RATIO} (ref 1–2)
ALP LIVER SERPL-CCNC: 118 U/L
ALT SERPL-CCNC: 21 U/L
ANION GAP SERPL CALC-SCNC: 4 MMOL/L (ref 0–18)
AST SERPL-CCNC: 16 U/L (ref 15–37)
BILIRUB SERPL-MCNC: 0.6 MG/DL (ref 0.1–2)
BUN BLD-MCNC: 20 MG/DL (ref 7–18)
BUN/CREAT SERPL: 18.5 (ref 10–20)
CALCIUM BLD-MCNC: 9.3 MG/DL (ref 8.5–10.1)
CHLORIDE SERPL-SCNC: 108 MMOL/L (ref 98–112)
CHOLEST SERPL-MCNC: 124 MG/DL
CHOLEST SMN-MCNC: 124 MG/DL (ref ?–200)
CO2 SERPL-SCNC: 30 MMOL/L (ref 21–32)
CREAT BLD-MCNC: 1.08 MG/DL
GLOBULIN PLAS-MCNC: 4 G/DL (ref 2.8–4.4)
GLUCOSE BLD-MCNC: 95 MG/DL (ref 70–99)
HDLC SERPL-MCNC: 43 MG/DL
HDLC SERPL-MCNC: 43 MG/DL (ref 40–59)
LDLC SERPL CALC-MCNC: 62 MG/DL
LDLC SERPL CALC-MCNC: 62 MG/DL (ref ?–100)
M PROTEIN MFR SERPL ELPH: 7.3 G/DL (ref 6.4–8.2)
NONHDLC SERPL-MCNC: 81 MG/DL
NONHDLC SERPL-MCNC: 81 MG/DL (ref ?–130)
OSMOLALITY SERPL CALC.SUM OF ELEC: 296 MOSM/KG (ref 275–295)
PATIENT FASTING Y/N/NP: YES
PATIENT FASTING Y/N/NP: YES
POTASSIUM SERPL-SCNC: 4.5 MMOL/L (ref 3.5–5.1)
SODIUM SERPL-SCNC: 142 MMOL/L (ref 136–145)
TRIGL SERPL-MCNC: 95 MG/DL
TRIGL SERPL-MCNC: 95 MG/DL (ref 30–149)
VLDLC SERPL CALC-MCNC: 19 MG/DL
VLDLC SERPL CALC-MCNC: 19 MG/DL (ref 0–30)

## 2020-09-23 PROCEDURE — 80061 LIPID PANEL: CPT

## 2020-09-23 PROCEDURE — 82306 VITAMIN D 25 HYDROXY: CPT

## 2020-09-23 PROCEDURE — 80053 COMPREHEN METABOLIC PANEL: CPT

## 2020-09-23 PROCEDURE — 36415 COLL VENOUS BLD VENIPUNCTURE: CPT

## 2020-09-24 ENCOUNTER — TELEPHONE (OUTPATIENT)
Dept: INTERNAL MEDICINE CLINIC | Facility: CLINIC | Age: 75
End: 2020-09-24

## 2020-09-24 NOTE — TELEPHONE ENCOUNTER
Please let patient know that I thought his labs came out good. Cholesterol good. Please let him know that I did speak to  will be answering his spinal device.   If able please have him get his DEXA scan that we spoke about before the implantatio

## 2020-09-25 ENCOUNTER — TELEPHONE (OUTPATIENT)
Dept: INTERNAL MEDICINE CLINIC | Facility: CLINIC | Age: 75
End: 2020-09-25

## 2020-09-25 ENCOUNTER — HOSPITAL ENCOUNTER (OUTPATIENT)
Dept: BONE DENSITY | Age: 75
Discharge: HOME | End: 2020-09-25
Attending: INTERNAL MEDICINE
Payer: MEDICARE

## 2020-09-25 DIAGNOSIS — M80.00XA AGE-RELATED OSTEOPOROSIS WITH CURRENT PATHOLOGICAL FRACTURE, INITIAL ENCOUNTER: ICD-10-CM

## 2020-09-25 DIAGNOSIS — E34.9 TESTOSTERONE DEFICIENCY: Primary | ICD-10-CM

## 2020-09-25 LAB — 25(OH)D3 SERPL-MCNC: 43.8 NG/ML (ref 30–100)

## 2020-09-25 PROCEDURE — 77080 DXA BONE DENSITY AXIAL: CPT | Performed by: INTERNAL MEDICINE

## 2020-09-28 ENCOUNTER — TELEPHONE (OUTPATIENT)
Dept: INTERNAL MEDICINE CLINIC | Facility: CLINIC | Age: 75
End: 2020-09-28

## 2020-09-28 NOTE — TELEPHONE ENCOUNTER
Please let patient know that his DEXA scan did show that he is got a condition called \"osteopenia\". This means that he does not have normal bone strength but also he does not have osteoporosis either.   Sometimes in men this can be precipitated by low te

## 2020-11-02 ENCOUNTER — LAB REQUISITION (OUTPATIENT)
Dept: LAB | Facility: HOSPITAL | Age: 75
End: 2020-11-02
Payer: MEDICARE

## 2020-11-02 DIAGNOSIS — Z01.818 ENCOUNTER FOR OTHER PREPROCEDURAL EXAMINATION: ICD-10-CM

## 2020-11-05 ENCOUNTER — TELEPHONE (OUTPATIENT)
Dept: PAIN CLINIC | Facility: HOSPITAL | Age: 75
End: 2020-11-05

## 2020-11-05 RX ORDER — CEPHALEXIN 500 MG/1
500 CAPSULE ORAL 2 TIMES DAILY
Qty: 14 CAPSULE | Refills: 0 | Status: SHIPPED | OUTPATIENT
Start: 2020-11-05 | End: 2020-11-12

## 2020-11-06 ENCOUNTER — TELEPHONE (OUTPATIENT)
Dept: PAIN CLINIC | Facility: HOSPITAL | Age: 75
End: 2020-11-06

## 2020-11-06 NOTE — TELEPHONE ENCOUNTER
Call placed to patient to check in on him s/p SCS placement. Patient reports fever last night that has since resolved. He reports a \"raspy\" voice which happened with the SCS trial. He reports that overall he is \"feeling pretty good. \" He started the ant

## 2020-11-09 ENCOUNTER — OFFICE VISIT (OUTPATIENT)
Dept: PAIN CLINIC | Facility: HOSPITAL | Age: 75
End: 2020-11-09
Attending: NURSE PRACTITIONER
Payer: MEDICARE

## 2020-11-09 VITALS
WEIGHT: 168 LBS | SYSTOLIC BLOOD PRESSURE: 116 MMHG | HEIGHT: 68 IN | BODY MASS INDEX: 25.46 KG/M2 | HEART RATE: 70 BPM | DIASTOLIC BLOOD PRESSURE: 71 MMHG

## 2020-11-09 DIAGNOSIS — M47.816 ARTHRITIS OF FACET JOINT OF LUMBAR SPINE: ICD-10-CM

## 2020-11-09 DIAGNOSIS — M48.061 FORAMINAL STENOSIS OF LUMBAR REGION: ICD-10-CM

## 2020-11-09 DIAGNOSIS — M51.36 DDD (DEGENERATIVE DISC DISEASE), LUMBAR: Primary | ICD-10-CM

## 2020-11-09 DIAGNOSIS — S32.000S COMPRESSION FRACTURE OF LUMBAR VERTEBRA, UNSPECIFIED LUMBAR VERTEBRAL LEVEL, SEQUELA: ICD-10-CM

## 2020-11-09 PROCEDURE — 99212 OFFICE O/P EST SF 10 MIN: CPT

## 2020-11-09 NOTE — PROGRESS NOTES
Patient presents to CPM for dressing change. Patient reports pain 2/10. Seeing Samy PEREZ.  Refer to note for POC

## 2020-11-09 NOTE — CHRONIC PAIN
[place] [facility] Follow up     HISTORY OF PRESENT ILLNESS:  Geremais Franco is a 67year old old male referred to the pain clinic by Dr. Javed ref.  provider found for DDD (degenerative disc disease), lumbar  (primary encounter diagnosis)  Jaron Sheets procedure. Pain then returned to baseline  He followed up with Dr. Lakesha Burciaga following the procedure who recommended a fusion. Patient states that he does not want any surgery at this time. He was referred by Dr. Lakesha Burciaga to our clinic for other options.  Keli left hip pain. He states he would \"crank up the stimulator\" and would obtain relief shortly after. He states when this would happen pre trial he would have to wait for the pain to slowly resolve.  He states she was about to walk around the grocery store y MEDICATIONS:  None    CURRENT MEDICATIONS:  Current Outpatient Medications   Medication Sig Dispense Refill   • cephALEXin 500 MG Oral Cap Take 1 capsule (500 mg total) by mouth 2 (two) times daily for 7 days.  14 capsule 0   • GABAPENTIN 300 MG Oral Cap TA Negative  Musculoskeletal: As above  Neurological: As above    MEDICAL HISTORY:  Patient Active Problem List:     Abdominal aneurysm (HCC)     Asbestosis ([facility]     Coronary atherosclerosis     Elevated prostate specific antigen (PSA)     Personal history o name: Not on file      Number of children: Not on file      Years of education: Not on file      Highest education level: Not on file    Occupational History      Not on file    Social Needs      Financial resource strain: Not on file      Food insecurity Not on file      ADVANCE CARE PLANNING:  Advance Care Plan is not on file; a surrogate decision maker was given and is in the patient's medical record.     PHYSICAL EXAMINATION:   11/09/20  1004   BP: 116/71   Pulse: 70   Weight: 168 lb (76.2 kg)   Height: height loss. There are hypertrophic changes of the lower lumbar facet joints. Small anterior endplate osteophytes seen within the lumbar vertebral bodies.  Remainder of the osseous   structures are otherwise intact without acute fracture, dislocation, or m the thoracolumbar spine. Multilevel degenerative spondylolisthesis as described.        Degenerative disc and facet disease throughout the lumbar spine, most prominent at L4-L5, where there is severe narrowing of the left neural foramen with bony encroa presents for dressing change      PLAN:  RECOMMENDATIONS:  1) incision sites cleaned and dressed   2) continue gabapentin   3) discussed precautions    4.  Finish antibiotic   Patient to follow up in one week with [employer] for reprogramming   Proce

## 2020-11-16 ENCOUNTER — OFFICE VISIT (OUTPATIENT)
Dept: PAIN CLINIC | Facility: HOSPITAL | Age: 75
End: 2020-11-16
Attending: NURSE PRACTITIONER
Payer: MEDICARE

## 2020-11-16 VITALS — DIASTOLIC BLOOD PRESSURE: 81 MMHG | SYSTOLIC BLOOD PRESSURE: 134 MMHG | RESPIRATION RATE: 18 BRPM | HEART RATE: 65 BPM

## 2020-11-16 DIAGNOSIS — M51.36 DDD (DEGENERATIVE DISC DISEASE), LUMBAR: Primary | ICD-10-CM

## 2020-11-16 DIAGNOSIS — M47.816 ARTHRITIS OF FACET JOINT OF LUMBAR SPINE: ICD-10-CM

## 2020-11-16 DIAGNOSIS — S32.000S COMPRESSION FRACTURE OF LUMBAR VERTEBRA, UNSPECIFIED LUMBAR VERTEBRAL LEVEL, SEQUELA: ICD-10-CM

## 2020-11-16 DIAGNOSIS — M48.061 FORAMINAL STENOSIS OF LUMBAR REGION: ICD-10-CM

## 2020-11-16 PROCEDURE — 99212 OFFICE O/P EST SF 10 MIN: CPT

## 2020-11-16 NOTE — CHRONIC PAIN
[place] [facility] Follow up     HISTORY OF PRESENT ILLNESS:  Arianna Fernandez is a 67year old old male referred to the pain clinic by Dr. Javed ref.  provider found for DDD (degenerative disc disease), lumbar  (primary encounter diagnosis)  Eren Sofia procedure. Pain then returned to baseline  He followed up with Dr. Jonas Cortes following the procedure who recommended a fusion. Patient states that he does not want any surgery at this time. He was referred by Dr. Jonas Cortes to our clinic for other options.  Keli left hip pain. He states he would \"crank up the stimulator\" and would obtain relief shortly after. He states when this would happen pre trial he would have to wait for the pain to slowly resolve.  He states she was about to walk around the grocery store y lays down at night. He denies recent fevers/chills. He completed his antibiotic like prescribed. He reports \"swelling\" at the battery site.    PAIN COURSE AND PREVIOUS INTERVENTIONS:  Medications:  Gabapentin 300mg TID, Advil, tylenol PRN   Interventions: Gastrointestinal:  No active ulcer, no change in B/B habits  Genitourinary:  Negative, no changes  Integumentary :  Negative  Psychiatric:  Negative  Hematologic: No active bleeding  Lymphatic: No current lymphedema  Allergic/Immunologic:  Negative  Musc Attack Brother 62        MI   • Heart Disease Mother         CAD   • Heart Surgery Mother [de-identified]        CABG   • Heart Disease Other         CAD       SOCIAL HISTORY:  Social History    Socioeconomic History      Marital status:       Spouse name: Not o Asked        Weight Concern: Not Asked        Special Diet: Not Asked        Back Care: Not Asked        Exercise: Not Asked        Bike Helmet: Not Asked        Seat Belt: Not Asked        Self-Exams: Not Asked    Social History Narrative      Not on file lesser extent, there is mild marrow edema seen along   the superior endplate of the L5 vertebral body without vertebral body height loss. There are hypertrophic changes of the lower lumbar facet joints.  Small anterior endplate osteophytes seen within the height. Subacute fracture of the superior endplate of the L5 vertebral body without vertebral body height loss. Scoliosis of the thoracolumbar spine. Multilevel degenerative spondylolisthesis as described.        Degenerative disc and facet dise history of compression fractures and he will be following up with him and ordering a DEXA scan, now s/p SCS implant on 11/5/20 who presents for follow up      PLAN:  RECOMMENDATIONS:  1) issa made programing changes today, education done with patient   2

## 2020-11-16 NOTE — PROGRESS NOTES
11/16/2020-Patient presents ambulatory to CPM for dressing change. Accompanied by his wife; dressings to back dry & intact;   Reports he is well; no longer having Numb/Ting in the lt ft;  Feeling very good; Patient reports pain 2/10. [place] rep.  Darcy Whaley at

## 2020-12-02 VITALS
DIASTOLIC BLOOD PRESSURE: 68 MMHG | TEMPERATURE: 97.7 F | BODY MASS INDEX: 22.89 KG/M2 | OXYGEN SATURATION: 99 % | WEIGHT: 154.98 LBS | RESPIRATION RATE: 17 BRPM | SYSTOLIC BLOOD PRESSURE: 107 MMHG | HEART RATE: 71 BPM

## 2020-12-02 VITALS
RESPIRATION RATE: 16 BRPM | SYSTOLIC BLOOD PRESSURE: 134 MMHG | DIASTOLIC BLOOD PRESSURE: 75 MMHG | HEART RATE: 70 BPM | OXYGEN SATURATION: 98 % | TEMPERATURE: 97.6 F | BODY MASS INDEX: 23.33 KG/M2 | WEIGHT: 157.98 LBS

## 2020-12-09 ENCOUNTER — OFFICE VISIT (OUTPATIENT)
Dept: PAIN CLINIC | Facility: HOSPITAL | Age: 75
End: 2020-12-09
Attending: ANESTHESIOLOGY
Payer: MEDICARE

## 2020-12-09 VITALS — OXYGEN SATURATION: 97 % | DIASTOLIC BLOOD PRESSURE: 67 MMHG | HEART RATE: 74 BPM | SYSTOLIC BLOOD PRESSURE: 105 MMHG

## 2020-12-09 DIAGNOSIS — S32.000S COMPRESSION FRACTURE OF LUMBAR VERTEBRA, UNSPECIFIED LUMBAR VERTEBRAL LEVEL, SEQUELA: ICD-10-CM

## 2020-12-09 DIAGNOSIS — M48.061 FORAMINAL STENOSIS OF LUMBAR REGION: Primary | ICD-10-CM

## 2020-12-09 DIAGNOSIS — Z96.89 STATUS POST INSERTION OF SPINAL CORD STIMULATOR: ICD-10-CM

## 2020-12-09 DIAGNOSIS — M51.36 DDD (DEGENERATIVE DISC DISEASE), LUMBAR: ICD-10-CM

## 2020-12-09 PROCEDURE — 99212 OFFICE O/P EST SF 10 MIN: CPT

## 2020-12-09 NOTE — CHRONIC PAIN
[place] [facility] Follow up     HISTORY OF PRESENT ILLNESS:  Gerardo Merida is a 67year old old male referred to the pain clinic by Dr. Javed ref.  provider found for Foraminal stenosis of lumbar region  (primary encounter diagnosis)  DDD (degen procedure. Pain then returned to baseline  He followed up with Dr. Oc Heredia following the procedure who recommended a fusion. Patient states that he does not want any surgery at this time. He was referred by Dr. Oc Heredia to our clinic for other options.  Keli left hip pain. He states he would \"crank up the stimulator\" and would obtain relief shortly after. He states when this would happen pre trial he would have to wait for the pain to slowly resolve.  He states she was about to walk around the grocery store y lays down at night. He denies recent fevers/chills. He completed his antibiotic like prescribed. He reports \"swelling\" at the battery site.    12/9/20 patient presents today for a return follow-up visit and reports that overall he is feeling greater than single bypass LIMA graft to LAD   • CATARACT  04/2019    bilateral   • IR REPAIR [facility] ENDOVASCULAR     • KNEE SURGERY Right 1972    cartilage removed   • OTHER SURGICAL HISTORY  8/2014    Modified Neck Dissection   • OTHER SURGICAL HISTORY  8/2014    Exc lung disease   • Nonspecific abnormal serum enzyme levels 4/25/2013   • Orthostatic hypotension    • Orthostatic hypotension    • Other and unspecified hyperlipidemia    • PEG (percutaneous endoscopic gastrostomy) status (HCC)    • Pleural plaque     calci service: Not on file        Active member of club or organization: Not on file        Attends meetings of clubs or organizations: Not on file        Relationship status: Not on file      Intimate partner violence        Fear of current or ex partner: Not o [facility], [facility]. [facility], MRI LUMBAR SPINE WO CONTR, 1/23/2007, 11:14. INDICATIONS:  M54.10 Radiculopathy, site unspecified     TECHNIQUE:    A variety of imaging planes and parameters were utilized for visualization of suspected pathology. There is severe narrowing of the bilateral subarticular and lateral recesses.   L4-L5:   Disk desiccation with bulging disk, facet, and ligamentous hypertrophy results in moderate central canal narrowing and moderate right and severe left neural foraminal n 02/27/2020    RDW 13.7 02/27/2020    .0 02/27/2020    MPV 8.4 12/15/2014     Lab Results   Component Value Date     09/23/2020    K 4.5 09/23/2020     09/23/2020    CO2 30.0 09/23/2020    BUN 20 (H) 09/23/2020    GLU 95 09/23/2020    CA care providers.

## 2020-12-09 NOTE — PROGRESS NOTES
PT present ambulatory to CPM. PT states back pain is 2-3 with simulator. 1 month f/u after SCS programing. Dr. Pradip Arias to see PT.   See note for POC

## 2020-12-10 RX ORDER — GABAPENTIN 300 MG/1
CAPSULE ORAL
Qty: 90 CAPSULE | Refills: 2 | Status: SHIPPED | OUTPATIENT
Start: 2020-12-10 | End: 2021-03-08

## 2020-12-14 ENCOUNTER — OFFICE VISIT (OUTPATIENT)
Dept: INTERNAL MEDICINE CLINIC | Facility: CLINIC | Age: 75
End: 2020-12-14
Payer: MEDICARE

## 2020-12-14 ENCOUNTER — LAB ENCOUNTER (OUTPATIENT)
Dept: LAB | Age: 75
End: 2020-12-14
Attending: INTERNAL MEDICINE
Payer: MEDICARE

## 2020-12-14 VITALS
BODY MASS INDEX: 26.01 KG/M2 | SYSTOLIC BLOOD PRESSURE: 110 MMHG | HEART RATE: 68 BPM | DIASTOLIC BLOOD PRESSURE: 70 MMHG | WEIGHT: 171.63 LBS | OXYGEN SATURATION: 99 % | TEMPERATURE: 98 F | HEIGHT: 68 IN

## 2020-12-14 DIAGNOSIS — M17.11 ARTHRITIS OF RIGHT KNEE: ICD-10-CM

## 2020-12-14 DIAGNOSIS — H02.401 DROOPING EYELID, RIGHT: ICD-10-CM

## 2020-12-14 DIAGNOSIS — Z01.818 PRE-OP EVALUATION: Primary | ICD-10-CM

## 2020-12-14 DIAGNOSIS — I71.4 ABDOMINAL AORTIC ANEURYSM (AAA) WITHOUT RUPTURE (HCC): ICD-10-CM

## 2020-12-14 DIAGNOSIS — E78.5 HYPERLIPIDEMIA, UNSPECIFIED HYPERLIPIDEMIA TYPE: ICD-10-CM

## 2020-12-14 DIAGNOSIS — R74.8 ELEVATED ALKALINE PHOSPHATASE LEVEL: ICD-10-CM

## 2020-12-14 DIAGNOSIS — Z96.89 S/P INSERTION OF SPINAL CORD STIMULATOR: ICD-10-CM

## 2020-12-14 DIAGNOSIS — Z85.810 HISTORY OF TONGUE CANCER: ICD-10-CM

## 2020-12-14 DIAGNOSIS — Z00.00 ROUTINE HEALTH MAINTENANCE: ICD-10-CM

## 2020-12-14 DIAGNOSIS — I25.10 CORONARY ARTERY DISEASE INVOLVING NATIVE CORONARY ARTERY OF NATIVE HEART WITHOUT ANGINA PECTORIS: ICD-10-CM

## 2020-12-14 DIAGNOSIS — Z01.818 PRE-OP EVALUATION: ICD-10-CM

## 2020-12-14 DIAGNOSIS — Z86.010 HISTORY OF ADENOMATOUS POLYP OF COLON: ICD-10-CM

## 2020-12-14 LAB
ALBUMIN SERPL-MCNC: 3.3 G/DL
ALBUMIN/GLOB SERPL: 0.9 {RATIO}
ALP SERPL-CCNC: 139 U/L
ALT SERPL-CCNC: 26 UNITS/L
ANION GAP SERPL CALC-SCNC: 3 MMOL/L
AST SERPL-CCNC: 16 UNITS/L
BILIRUB SERPL-MCNC: 0.5 MG/DL
BUN SERPL-MCNC: 25 MG/DL
BUN/CREAT SERPL: 21.2
CALCIUM SERPL-MCNC: 9.4 MG/DL
CHLORIDE SERPL-SCNC: 110 MMOL/L
CO2 SERPL-SCNC: 31 MMOL/L
CREAT SERPL-MCNC: 1.18 MG/DL
GLOBULIN SER-MCNC: 3.6 G/DL
GLUCOSE SERPL-MCNC: 89 MG/DL
HCT VFR BLD CALC: 42.1 %
HGB BLD-MCNC: 13.6 G/DL
PLATELET # BLD: 177 K/MCL
POTASSIUM SERPL-SCNC: 4.3 MMOL/L
PROT SERPL-MCNC: 6.9 G/DL
RBC # BLD: 4.8 10*6/UL
SODIUM SERPL-SCNC: 144 MMOL/L
WBC # BLD: 5.7 K/MCL

## 2020-12-14 PROCEDURE — 3074F SYST BP LT 130 MM HG: CPT | Performed by: INTERNAL MEDICINE

## 2020-12-14 PROCEDURE — 36415 COLL VENOUS BLD VENIPUNCTURE: CPT

## 2020-12-14 PROCEDURE — 3008F BODY MASS INDEX DOCD: CPT | Performed by: INTERNAL MEDICINE

## 2020-12-14 PROCEDURE — 80053 COMPREHEN METABOLIC PANEL: CPT

## 2020-12-14 PROCEDURE — 84075 ASSAY ALKALINE PHOSPHATASE: CPT

## 2020-12-14 PROCEDURE — 84080 ASSAY ALKALINE PHOSPHATASES: CPT

## 2020-12-14 PROCEDURE — 93000 ELECTROCARDIOGRAM COMPLETE: CPT | Performed by: INTERNAL MEDICINE

## 2020-12-14 PROCEDURE — 3078F DIAST BP <80 MM HG: CPT | Performed by: INTERNAL MEDICINE

## 2020-12-14 PROCEDURE — 99214 OFFICE O/P EST MOD 30 MIN: CPT | Performed by: INTERNAL MEDICINE

## 2020-12-14 PROCEDURE — 85025 COMPLETE CBC W/AUTO DIFF WBC: CPT

## 2020-12-15 ENCOUNTER — MED REC SCAN ONLY (OUTPATIENT)
Dept: INTERNAL MEDICINE CLINIC | Facility: CLINIC | Age: 75
End: 2020-12-15

## 2020-12-15 ENCOUNTER — TELEPHONE (OUTPATIENT)
Dept: INTERNAL MEDICINE CLINIC | Facility: CLINIC | Age: 75
End: 2020-12-15

## 2020-12-15 ENCOUNTER — TELEPHONE (OUTPATIENT)
Dept: CARDIOLOGY | Age: 75
End: 2020-12-15

## 2020-12-15 DIAGNOSIS — R74.8 ELEVATED ALKALINE PHOSPHATASE LEVEL: Primary | ICD-10-CM

## 2020-12-15 NOTE — TELEPHONE ENCOUNTER
Please call patient and ask him if he was able to connect with Dr. Claire Martinez for preop evaluation prior to his knee surgery.

## 2020-12-15 NOTE — PROGRESS NOTES
Milka Gallardo is a 67year old male. HPI:   Patient presents with:  Pre-Op: right knee replacement 1/13/21     Krish Garcia comes in for preoperative evaluation. He will be having a right total knee replacement January 13 by Ralph Hugo.      I did a stimulator. He follows with Dr. Yuliya Quintero  He last saw him December 9, 2020. Patient states he is around 70% improved.   Current Outpatient Medications   Medication Sig Dispense Refill   • GABAPENTIN 300 MG Oral Cap TAKE 1 CAPSULE(300 MG) BY MOUTH THREE BELEN shortness of breath with exertion or cough  CARDIOVASCULAR:  Voices no chest pain on exertion or shortness of breath  GI:   Voices no abdominal pain or changes of bowels   :Viices no urning or frequency of urination.   NEURO:  Voices no  headaches or dizz He had Pneumovax in 2016 and Prevnar in 2015    8. Drooping eyelid, right  I think this is mostly extra skin. He will see his eye doctor. 9. S/P insertion of spinal cord stimulator  He does have spinal cord stimulator. This seems to be working well.

## 2020-12-15 NOTE — TELEPHONE ENCOUNTER
Please fax letter that I generated along with my office visit note and EKG to Dr. Gino Montgomery. In outbox.

## 2020-12-15 NOTE — TELEPHONE ENCOUNTER
[address] [facility]   [address] [place] [address] [place]  F:[phone]      Faxed paperwork to [address] office

## 2020-12-16 ENCOUNTER — TELEPHONE (OUTPATIENT)
Dept: CARDIOLOGY | Age: 75
End: 2020-12-16

## 2020-12-16 DIAGNOSIS — Z95.1 HX OF CABG: ICD-10-CM

## 2020-12-16 DIAGNOSIS — Z01.810 PRE-OPERATIVE CARDIOVASCULAR EXAMINATION: Primary | ICD-10-CM

## 2020-12-16 DIAGNOSIS — I25.10 CORONARY ARTERY DISEASE INVOLVING NATIVE CORONARY ARTERY OF NATIVE HEART WITHOUT ANGINA PECTORIS: ICD-10-CM

## 2020-12-17 NOTE — TELEPHONE ENCOUNTER
Please make follow-up call today to see if patient has made arrangements to see Dr. Kavitha Black prior to his knee surgery.   It is important that he communicates with Dr. Kavitha Black if any cardiac testing is needed prior to his knee surgery

## 2020-12-17 NOTE — TELEPHONE ENCOUNTER
JOSE MANUEL to Dr. Lakesha Cabezas; Spoke with Kinjal Hernandez --- Dr. Gloria Brown ordered a Stress Test in which patient will be having done Jan 7th

## 2020-12-29 ENCOUNTER — TELEPHONE (OUTPATIENT)
Dept: INTERNAL MEDICINE CLINIC | Facility: CLINIC | Age: 75
End: 2020-12-29

## 2020-12-30 NOTE — TELEPHONE ENCOUNTER
Please call patient. We had called about a week ago and he indicated he was going to get a stress test from Dr. Karla Hines but I neglected to write down the date so I can look out for the results to complete his paperwork for his knee surgery.

## 2021-01-01 ENCOUNTER — EXTERNAL RECORD (OUTPATIENT)
Dept: HEALTH INFORMATION MANAGEMENT | Facility: OTHER | Age: 76
End: 2021-01-01

## 2021-01-04 ENCOUNTER — OFFICE VISIT (OUTPATIENT)
Dept: PAIN CLINIC | Facility: HOSPITAL | Age: 76
End: 2021-01-04
Attending: NURSE PRACTITIONER
Payer: MEDICARE

## 2021-01-04 VITALS — OXYGEN SATURATION: 98 % | DIASTOLIC BLOOD PRESSURE: 66 MMHG | SYSTOLIC BLOOD PRESSURE: 113 MMHG | HEART RATE: 67 BPM

## 2021-01-04 DIAGNOSIS — M47.816 ARTHRITIS OF FACET JOINT OF LUMBAR SPINE: ICD-10-CM

## 2021-01-04 DIAGNOSIS — M48.061 FORAMINAL STENOSIS OF LUMBAR REGION: Primary | ICD-10-CM

## 2021-01-04 DIAGNOSIS — S32.000S COMPRESSION FRACTURE OF LUMBAR VERTEBRA, UNSPECIFIED LUMBAR VERTEBRAL LEVEL, SEQUELA: ICD-10-CM

## 2021-01-04 DIAGNOSIS — Z96.89 STATUS POST INSERTION OF SPINAL CORD STIMULATOR: ICD-10-CM

## 2021-01-04 DIAGNOSIS — M51.36 DDD (DEGENERATIVE DISC DISEASE), LUMBAR: ICD-10-CM

## 2021-01-04 PROCEDURE — 99212 OFFICE O/P EST SF 10 MIN: CPT

## 2021-01-04 NOTE — PROGRESS NOTES
Pt presents ambulatory to the CPM. PT to have SCS reprogrammed by [facility]. APN [place] to see pt W? [place] rep. See notes for POC.

## 2021-01-04 NOTE — CHRONIC PAIN
[facility] Follow up     HISTORY OF PRESENT ILLNESS:  Andrea Sprague is a 67year old old male referred to the pain clinic by Dr. Javed ref.  provider found for Foraminal stenosis of lumbar region  (primary encounter diagnosis)  DDD (degen table and for a few hours following the procedure. Pain then returned to baseline  He followed up with Dr. Raquel Kingston following the procedure who recommended a fusion. Patient states that he does not want any surgery at this time.  He was referred by Dr. Ban Blair mornings he would wake up with \"severe\" left hip pain. He states he would \"crank up the stimulator\" and would obtain relief shortly after. He states when this would happen pre trial he would have to wait for the pain to slowly resolve.  He states she wa he needs to make some adjustments when he lays down at night. He denies recent fevers/chills. He completed his antibiotic like prescribed. He reports \"swelling\" at the battery site.    12/9/20 patient presents today for a return follow-up visit and report tablet by mouth daily. • Calcium Carb-Cholecalciferol (CALCIUM 600 + D) 600-200 MG-UNIT Oral Tab Take 1 tablet by mouth 2 (two) times daily. Scheduled Meds:  Continuous Infusions:  PRN Meds:.         ALLERGIES:  No Known Allergies    SURGICAL HIST Medical History:   Diagnosis Date   • AAA (abdominal aortic aneurysm) ([facility] [address]    • Appendicitis    • ASHD (arteriosclerotic heart disease)     LIMA graft to LAD   • Colon polyps    • Diabetes insipidus ([facility] [address]    • Diabetes insipidus ([facility] [address]    • Elevated PSA Substance and Sexual Activity      Alcohol use: No        Alcohol/week: 0.0 standard drinks      Drug use: No      Sexual activity: Not on file    Lifestyle      Physical activity        Days per week: Not on file        Minutes per session: Not on file extremities  Edema - Absent  Sensation (light touch/pinprick/temperature):     Right Lower Extremity:  Normal  Left Lower Extremity: Normal   IMAGING:  PROCEDURE:  MRI SPINE LUMBAR (XDQ=87802)     COMPARISON: [facility], CTA ABD/PEL (CPT=74 hypertrophy results in moderate central canal narrowing and moderate bilateral neural foraminal narrowing.   L3-L4:   Disk desiccation with bulging disk, facet, and ligamentous hypertrophy results in moderate central canal narrowing and moderate bilateral n evidence for muscle or tendon tear.  No cartilaginous cap or associated soft tissues mass    LABS:  Lab Results   Component Value Date    WBC 5.7 12/14/2020    RBC 4.80 12/14/2020    HGB 13.6 12/14/2020    HCT 42.1 12/14/2020    MCV 87.7 12/14/2020    [facility] 2 spent with counseling (nature of discussion centered around pain, therapy, and treatment options), face to face time, time spent reviewing data, obtaining patient information and discussing the care with the patients health care providers.

## 2021-01-06 ENCOUNTER — DOCUMENTATION (OUTPATIENT)
Dept: CARDIOLOGY | Age: 76
End: 2021-01-06

## 2021-01-06 PROBLEM — Z01.810 PREOP CARDIOVASCULAR EXAM: Status: ACTIVE | Noted: 2021-01-06

## 2021-01-07 ENCOUNTER — ANCILLARY PROCEDURE (OUTPATIENT)
Dept: CARDIOLOGY | Age: 76
End: 2021-01-07
Attending: INTERNAL MEDICINE

## 2021-01-07 DIAGNOSIS — Z01.810 PRE-OPERATIVE CARDIOVASCULAR EXAMINATION: ICD-10-CM

## 2021-01-07 DIAGNOSIS — I25.10 CORONARY ARTERY DISEASE INVOLVING NATIVE CORONARY ARTERY OF NATIVE HEART WITHOUT ANGINA PECTORIS: ICD-10-CM

## 2021-01-07 DIAGNOSIS — Z95.1 HX OF CABG: ICD-10-CM

## 2021-01-07 LAB
LV EF: 55 %
STRESS POST EXERCISE DUR SEC: 30 SEC
STRESS TARGET HR: 144 BPM

## 2021-01-07 PROCEDURE — 93015 CV STRESS TEST SUPVJ I&R: CPT | Performed by: INTERNAL MEDICINE

## 2021-01-07 PROCEDURE — 78452 HT MUSCLE IMAGE SPECT MULT: CPT | Performed by: INTERNAL MEDICINE

## 2021-01-07 PROCEDURE — A9502 TC99M TETROFOSMIN: HCPCS | Performed by: INTERNAL MEDICINE

## 2021-01-07 RX ORDER — REGADENOSON 0.08 MG/ML
0.4 INJECTION, SOLUTION INTRAVENOUS ONCE
Status: COMPLETED | OUTPATIENT
Start: 2021-01-07 | End: 2021-01-07

## 2021-01-07 RX ADMIN — REGADENOSON 0.4 MG: 0.08 INJECTION, SOLUTION INTRAVENOUS at 13:00

## 2021-01-07 ASSESSMENT — EXERCISE STRESS TEST
PEAK_RPP: 7772
STAGE_CATEGORIES: RECOVERY 2
PEAK_RPP: 8178
PEAK_BP: 116/64
PEAK_HR: 86
PEAK_RPP: 8000
PEAK_RPP: 8084
PEAK_BP: 100/54
PEAK_HR: 80
COMMENTS: 2 MINUTE RECOVERY; SYMPTOMS RESOLVING
STOPPAGE_REASON: PROTOCOL COMPLETE
STAGE_CATEGORIES: RECOVERY 3
PEAK_HR: 87
STAGE_CATEGORIES: RECOVERY 1
STAGE_CATEGORIES: RECOVERY 0
PEAK_BP: 116/62
STAGE_CATEGORIES: RESTING
PEAK_BP: 94/60
STAGE_CATEGORIES: 1
PEAK_HR: 61
COMMENTS: 6 MINUTE RECOVERY
COMMENTS: 4 MINUTE RECOVERY
PEAK_HR: 67
PEAK_BP: 94/54
PEAK_HR: 59
PEAK_RPP: 7076
COMMENTS: INJECTED AT :25
COMMENTS: 20 SECOND RECOVERY

## 2021-01-08 ENCOUNTER — OFFICE VISIT (OUTPATIENT)
Dept: CARDIOLOGY | Age: 76
End: 2021-01-08

## 2021-01-08 ENCOUNTER — TELEPHONE (OUTPATIENT)
Dept: CARDIOLOGY | Age: 76
End: 2021-01-08

## 2021-01-08 VITALS
DIASTOLIC BLOOD PRESSURE: 66 MMHG | SYSTOLIC BLOOD PRESSURE: 110 MMHG | HEART RATE: 69 BPM | BODY MASS INDEX: 25.31 KG/M2 | HEIGHT: 68 IN | WEIGHT: 167 LBS

## 2021-01-08 DIAGNOSIS — I25.10 CORONARY ARTERY DISEASE INVOLVING NATIVE CORONARY ARTERY OF NATIVE HEART WITHOUT ANGINA PECTORIS: ICD-10-CM

## 2021-01-08 DIAGNOSIS — Z01.810 PREOP CARDIOVASCULAR EXAM: Primary | ICD-10-CM

## 2021-01-08 DIAGNOSIS — Z95.1 HX OF CABG: ICD-10-CM

## 2021-01-08 PROCEDURE — 99214 OFFICE O/P EST MOD 30 MIN: CPT | Performed by: NURSE PRACTITIONER

## 2021-01-08 ASSESSMENT — PATIENT HEALTH QUESTIONNAIRE - PHQ9
SUM OF ALL RESPONSES TO PHQ9 QUESTIONS 1 AND 2: 0
SUM OF ALL RESPONSES TO PHQ9 QUESTIONS 1 AND 2: 0
CLINICAL INTERPRETATION OF PHQ2 SCORE: NO FURTHER SCREENING NEEDED
1. LITTLE INTEREST OR PLEASURE IN DOING THINGS: NOT AT ALL
2. FEELING DOWN, DEPRESSED OR HOPELESS: NOT AT ALL
CLINICAL INTERPRETATION OF PHQ9 SCORE: NO FURTHER SCREENING NEEDED

## 2021-01-11 ENCOUNTER — TELEPHONE (OUTPATIENT)
Dept: INTERNAL MEDICINE CLINIC | Facility: CLINIC | Age: 76
End: 2021-01-11

## 2021-01-11 NOTE — TELEPHONE ENCOUNTER
Dr Mattie Paige pre-op note, EKG, labs, ortho and cardio notes faxed to Dr Kristin Fam at [phone]. Packet given to Dr Edmundo Coulter.

## 2021-01-11 NOTE — TELEPHONE ENCOUNTER
Please fax my preoperative clearance with my office visit note, labs, EKG, Andreina Brown's cardiology consultation and Demetrio PEREZ for pain management in regards to his spinal stimulator. Fax to patient's orthopedic surgeon Dr. Juanpablo Moura.   Fax numb

## 2021-01-12 NOTE — TELEPHONE ENCOUNTER
Dedrick at [facility] in [place] called  Needs medical clearance & labs for pt's upcoming surgery tomorrow    Please send to fax # [phone] can be reached at ph# [phone]

## 2021-01-12 NOTE — TELEPHONE ENCOUNTER
Yes.  I have collected the information again. We faxed the same number yesterday with success but okay to refax. In out box.

## 2021-01-12 NOTE — TELEPHONE ENCOUNTER
Notified Pre-Op Dept p: [phone] at [address] [facility] that paperwork has been re-faxed. Rep states she will monitor for fax.

## 2021-01-12 NOTE — TELEPHONE ENCOUNTER
Clearance and labs faxed to DR. Kristin Fam at [facility] - given to DR. VILLALPANDO

## 2021-01-12 NOTE — TELEPHONE ENCOUNTER
To Dr. Molly Carrasquillo - see below. Do you have packet to refax to [facility] in [place]?

## 2021-01-27 DIAGNOSIS — Z23 NEED FOR VACCINATION: ICD-10-CM

## 2021-03-08 RX ORDER — GABAPENTIN 300 MG/1
CAPSULE ORAL
Qty: 90 CAPSULE | Refills: 2 | Status: SHIPPED | OUTPATIENT
Start: 2021-03-08 | End: 2021-08-04

## 2021-03-09 ENCOUNTER — OFFICE VISIT (OUTPATIENT)
Dept: CARDIOLOGY | Age: 76
End: 2021-03-09

## 2021-03-09 VITALS
BODY MASS INDEX: 25.31 KG/M2 | HEIGHT: 68 IN | DIASTOLIC BLOOD PRESSURE: 70 MMHG | OXYGEN SATURATION: 98 % | WEIGHT: 167 LBS | SYSTOLIC BLOOD PRESSURE: 116 MMHG | HEART RATE: 64 BPM

## 2021-03-09 DIAGNOSIS — I71.40 ANEURYSM OF ABDOMINAL VESSEL (CMD): ICD-10-CM

## 2021-03-09 DIAGNOSIS — I25.10 CORONARY ARTERY DISEASE INVOLVING NATIVE CORONARY ARTERY OF NATIVE HEART WITHOUT ANGINA PECTORIS: Primary | ICD-10-CM

## 2021-03-09 DIAGNOSIS — E78.5 DYSLIPIDEMIA: ICD-10-CM

## 2021-03-09 PROCEDURE — 99214 OFFICE O/P EST MOD 30 MIN: CPT | Performed by: INTERNAL MEDICINE

## 2021-03-09 ASSESSMENT — PATIENT HEALTH QUESTIONNAIRE - PHQ9
1. LITTLE INTEREST OR PLEASURE IN DOING THINGS: NOT AT ALL
SUM OF ALL RESPONSES TO PHQ9 QUESTIONS 1 AND 2: 0
CLINICAL INTERPRETATION OF PHQ2 SCORE: NO FURTHER SCREENING NEEDED
SUM OF ALL RESPONSES TO PHQ9 QUESTIONS 1 AND 2: 0
2. FEELING DOWN, DEPRESSED OR HOPELESS: NOT AT ALL
CLINICAL INTERPRETATION OF PHQ9 SCORE: NO FURTHER SCREENING NEEDED

## 2021-03-18 ENCOUNTER — OFFICE VISIT (OUTPATIENT)
Dept: INTERNAL MEDICINE CLINIC | Facility: CLINIC | Age: 76
End: 2021-03-18
Payer: MEDICARE

## 2021-03-18 VITALS
WEIGHT: 169 LBS | SYSTOLIC BLOOD PRESSURE: 119 MMHG | HEART RATE: 70 BPM | BODY MASS INDEX: 26 KG/M2 | OXYGEN SATURATION: 98 % | TEMPERATURE: 98 F | DIASTOLIC BLOOD PRESSURE: 60 MMHG | RESPIRATION RATE: 12 BRPM

## 2021-03-18 DIAGNOSIS — I25.10 CORONARY ARTERY DISEASE INVOLVING NATIVE CORONARY ARTERY OF NATIVE HEART WITHOUT ANGINA PECTORIS: Primary | ICD-10-CM

## 2021-03-18 DIAGNOSIS — R39.9 LOWER URINARY TRACT SYMPTOMS: ICD-10-CM

## 2021-03-18 DIAGNOSIS — Z00.00 ROUTINE HEALTH MAINTENANCE: ICD-10-CM

## 2021-03-18 DIAGNOSIS — Z86.010 HISTORY OF ADENOMATOUS POLYP OF COLON: ICD-10-CM

## 2021-03-18 DIAGNOSIS — E78.5 HYPERLIPIDEMIA, UNSPECIFIED HYPERLIPIDEMIA TYPE: ICD-10-CM

## 2021-03-18 DIAGNOSIS — M85.80 OSTEOPENIA, UNSPECIFIED LOCATION: ICD-10-CM

## 2021-03-18 DIAGNOSIS — R74.8 ELEVATED ALKALINE PHOSPHATASE LEVEL: ICD-10-CM

## 2021-03-18 DIAGNOSIS — Z96.89 S/P INSERTION OF SPINAL CORD STIMULATOR: ICD-10-CM

## 2021-03-18 DIAGNOSIS — I71.4 ABDOMINAL AORTIC ANEURYSM (AAA) WITHOUT RUPTURE (HCC): ICD-10-CM

## 2021-03-18 DIAGNOSIS — Z85.810 HISTORY OF TONGUE CANCER: ICD-10-CM

## 2021-03-18 PROCEDURE — 3078F DIAST BP <80 MM HG: CPT | Performed by: INTERNAL MEDICINE

## 2021-03-18 PROCEDURE — 99214 OFFICE O/P EST MOD 30 MIN: CPT | Performed by: INTERNAL MEDICINE

## 2021-03-18 PROCEDURE — 3074F SYST BP LT 130 MM HG: CPT | Performed by: INTERNAL MEDICINE

## 2021-03-18 NOTE — PROGRESS NOTES
Erlinda Morales is a 67year old male. HPI:   Patient presents with: Follow - Up: 6 month follow up. Pt reports he has been feeling well. Charlene Barajas comes in for checkup. He is feeling generally well.     He is right knee replacement and is doing ext FORMULA) Oral Tab Take 1 tablet by mouth daily. • aspirin 81 MG Oral Tab Take 81 mg by mouth daily. • Vitamin C (VITAMIN C) 500 MG Oral Tab Take 500 mg by mouth daily.      • Multiple Vitamins-Minerals (MENS MULTIVITAMIN PLUS) Oral Tab Take 1 tablet 169 lb (76.7 kg)   SpO2 98%   BMI 25.70 kg/m²     GENERAL:  well developed, well nourished, in no apparent distress  SKIN:  no rashes , no suspicious lesions  HEENT: atraumatic. Pharynx normal without exudate. His right eyelid skin seems droopy to me. Osteopenia, unspecified location  Check testosterone level. This visit was 30 minutes. I spent 10 minutes before visit preparing and reviewing old records. Greater than 50% of the visit was engaged in counseling and review of past data.     Follow up 6

## 2021-03-24 ENCOUNTER — LAB ENCOUNTER (OUTPATIENT)
Dept: LAB | Age: 76
End: 2021-03-24
Attending: INTERNAL MEDICINE
Payer: MEDICARE

## 2021-03-24 DIAGNOSIS — M85.80 OSTEOPENIA, UNSPECIFIED LOCATION: ICD-10-CM

## 2021-03-24 DIAGNOSIS — R39.9 LOWER URINARY TRACT SYMPTOMS: ICD-10-CM

## 2021-03-24 LAB
BILIRUB UR QL: NEGATIVE
COLOR UR: YELLOW
GLUCOSE UR-MCNC: NEGATIVE MG/DL
HGB UR QL STRIP.AUTO: NEGATIVE
KETONES UR-MCNC: NEGATIVE MG/DL
NITRITE UR QL STRIP.AUTO: NEGATIVE
PH UR: 6 [PH] (ref 5–8)
PROT UR-MCNC: NEGATIVE MG/DL
SP GR UR STRIP: 1.02 (ref 1–1.03)
UROBILINOGEN UR STRIP-ACNC: <2
WBC #/AREA URNS AUTO: >50 /HPF

## 2021-03-24 PROCEDURE — 84403 ASSAY OF TOTAL TESTOSTERONE: CPT

## 2021-03-24 PROCEDURE — 87077 CULTURE AEROBIC IDENTIFY: CPT | Performed by: INTERNAL MEDICINE

## 2021-03-24 PROCEDURE — 84402 ASSAY OF FREE TESTOSTERONE: CPT

## 2021-03-24 PROCEDURE — 36415 COLL VENOUS BLD VENIPUNCTURE: CPT

## 2021-03-24 PROCEDURE — 87186 SC STD MICRODIL/AGAR DIL: CPT | Performed by: INTERNAL MEDICINE

## 2021-03-24 PROCEDURE — 81001 URINALYSIS AUTO W/SCOPE: CPT | Performed by: INTERNAL MEDICINE

## 2021-03-24 PROCEDURE — 87086 URINE CULTURE/COLONY COUNT: CPT | Performed by: INTERNAL MEDICINE

## 2021-03-26 RX ORDER — ATORVASTATIN CALCIUM 40 MG/1
TABLET, FILM COATED ORAL
Qty: 90 TABLET | Refills: 3 | Status: SHIPPED | OUTPATIENT
Start: 2021-03-26

## 2021-03-27 LAB
TESTOSTERONE, FREE, S: 11.6 NG/DL
TESTOSTERONE, TOTAL, S: 485 NG/DL

## 2021-03-30 ENCOUNTER — TELEPHONE (OUTPATIENT)
Dept: INTERNAL MEDICINE CLINIC | Facility: CLINIC | Age: 76
End: 2021-03-30

## 2021-03-30 NOTE — TELEPHONE ENCOUNTER
Please fax letter along with urine culture result to Dr. Camille Richardson. ( Discussed with patient. Urine culture shows 50-99,000 of Streptococcus Gallolyticus pasteurianus. He is not very symptomatic.   Maybe a little bit of a slow stream.  He does not h

## 2021-04-01 ENCOUNTER — TELEPHONE (OUTPATIENT)
Dept: INTERNAL MEDICINE CLINIC | Facility: CLINIC | Age: 76
End: 2021-04-01

## 2021-04-01 DIAGNOSIS — R82.90 ABNORMAL URINALYSIS: Primary | ICD-10-CM

## 2021-04-01 DIAGNOSIS — N39.0 URINARY TRACT INFECTION WITHOUT HEMATURIA, SITE UNSPECIFIED: ICD-10-CM

## 2021-04-01 RX ORDER — AMPICILLIN 500 MG/1
500 CAPSULE ORAL 4 TIMES DAILY
Qty: 20 CAPSULE | Refills: 0 | Status: SHIPPED | OUTPATIENT
Start: 2021-04-01 | End: 2021-05-11 | Stop reason: ALTCHOICE

## 2021-04-01 NOTE — TELEPHONE ENCOUNTER
Please call patient. Please let him know that I did a little research on the bacteria that he has in the urine.     Even though he does not have a lot of urinary symptoms I thought because of his reported frequency and some morning burning which I think he

## 2021-04-01 NOTE — TELEPHONE ENCOUNTER
Spoke with patient and relayed Dr. Jese Choe message. Patient verbalized an understanding an is agreeable to taking antibiotics. eRx sent to [facility] in [place], [place] per patient's request. Patient will go for UA and C&S when he returns from [place].  UA and C

## 2021-04-12 ENCOUNTER — LAB ENCOUNTER (OUTPATIENT)
Dept: LAB | Age: 76
End: 2021-04-12
Attending: INTERNAL MEDICINE
Payer: MEDICARE

## 2021-04-12 DIAGNOSIS — N39.0 URINARY TRACT INFECTION WITHOUT HEMATURIA, SITE UNSPECIFIED: ICD-10-CM

## 2021-04-12 PROCEDURE — 87086 URINE CULTURE/COLONY COUNT: CPT

## 2021-04-12 PROCEDURE — 81001 URINALYSIS AUTO W/SCOPE: CPT

## 2021-04-13 ENCOUNTER — TELEPHONE (OUTPATIENT)
Dept: INTERNAL MEDICINE CLINIC | Facility: CLINIC | Age: 76
End: 2021-04-13

## 2021-04-13 NOTE — TELEPHONE ENCOUNTER
Please let patient know that his urinalysis now is good. Urine culture shows no bacteria. Please ask him if by getting rid of the bacteria his urination is any better.     Also he has he had a chance to make an appoint with Dr. Camille Richardson for his colonosc

## 2021-04-13 NOTE — TELEPHONE ENCOUNTER
As FYI to DR. VILLALPANDO - called patient and relayed DR. VILLALPANDO message - verbalized understanding He states he is feeling a little better still some urgency sometimes to urinate

## 2021-04-26 ENCOUNTER — TELEPHONE (OUTPATIENT)
Dept: INTERNAL MEDICINE CLINIC | Facility: CLINIC | Age: 76
End: 2021-04-26

## 2021-04-26 NOTE — TELEPHONE ENCOUNTER
Please call patient. Please remind him to see Dr. Mitul Cantor. The reason for the phone call is that I would like him to have his colonoscopy sooner than later.   The bacteria that he had in his urine is sometimes associated with colon polyps or colon tumo

## 2021-04-27 NOTE — TELEPHONE ENCOUNTER
JOSE MANUEL to Dr. Ben Murphy to patient and relayed MD message. Patient verbalized understanding.    Patient states he plans to call Dr. Ishmael Sanon office today or tomorrow---

## 2021-05-10 ENCOUNTER — TELEPHONE (OUTPATIENT)
Dept: INTERNAL MEDICINE CLINIC | Facility: CLINIC | Age: 76
End: 2021-05-10

## 2021-05-10 DIAGNOSIS — R26.89 BALANCE DISORDER: ICD-10-CM

## 2021-05-10 DIAGNOSIS — S09.90XA TRAUMATIC INJURY OF HEAD, INITIAL ENCOUNTER: Primary | ICD-10-CM

## 2021-05-10 NOTE — TELEPHONE ENCOUNTER
Message noted. Thank you for discussing CT with wife. I did place order for CT brain stat if that will help with tomorrows visit.  Otherwise if insurance authorization takes to much time I will refer him to [facility] for an evaluation and the CT brain i

## 2021-05-10 NOTE — TELEPHONE ENCOUNTER
Sorry for additional message. I am wondering if ER did CT Brain. I just reviewed wife's Ayesha's Jennifert message to me. She noted balance seems off. If this is the case he would need a CT brain. I am wondering if he should return to ER to be reevaluated.

## 2021-05-10 NOTE — TELEPHONE ENCOUNTER
Dr. Alberto Castro, the pt. Called back ans stated he can only come in tomorrow morning. With that being said, I scheduled him to see you at 10:30 on Tuesday Morning.

## 2021-05-10 NOTE — TELEPHONE ENCOUNTER
Spoke with patient's wife, Woody Slaughter, who reports patient went to [address] [facility] ER in [place] the day after his fall. Spoke with medical records dept at [address] [place] [address]. They request a fax cover sheet request to be sent to [phone].  Request for al

## 2021-05-10 NOTE — TELEPHONE ENCOUNTER
Please call wife. We need to find out where he was seen and treated and try to get the records of notes, Xray reports or anything else that will help with his visit.

## 2021-05-10 NOTE — TELEPHONE ENCOUNTER
Please advise - called patient who fell down 10 stairs on Thursday , landed on stomach and face - above left eye has butterfly bandage - patient cannot come into office until Friday because he is not available .  Feeling sore - ribs on left side - no fractu

## 2021-05-10 NOTE — TELEPHONE ENCOUNTER
Called provider line on back of patient's insurance card and spoke with An Kothari. She reports prior [facility] is needed and I will have to call [phone]. Reference number [id number]. Called number above and spoke with Eduarda Reddy.  Went through prompts in ord

## 2021-05-10 NOTE — TELEPHONE ENCOUNTER
Spoke with patient and Padmini Murphy on the phone about MD message below. Explained I did request records but they may take a while to be faxed over. Patient is fairly certain a CT of his brain was NOT done at the ER yesterday.  Wife expresses concern about long

## 2021-05-11 ENCOUNTER — HOSPITAL ENCOUNTER (OUTPATIENT)
Dept: GENERAL RADIOLOGY | Age: 76
Discharge: HOME | End: 2021-05-11
Attending: INTERNAL MEDICINE
Payer: MEDICARE

## 2021-05-11 ENCOUNTER — OFFICE VISIT (OUTPATIENT)
Dept: INTERNAL MEDICINE CLINIC | Facility: CLINIC | Age: 76
End: 2021-05-11
Payer: MEDICARE

## 2021-05-11 ENCOUNTER — TELEPHONE (OUTPATIENT)
Dept: INTERNAL MEDICINE CLINIC | Facility: CLINIC | Age: 76
End: 2021-05-11

## 2021-05-11 ENCOUNTER — HOSPITAL ENCOUNTER (OUTPATIENT)
Dept: CT IMAGING | Facility: HOSPITAL | Age: 76
Discharge: HOME | End: 2021-05-11
Attending: INTERNAL MEDICINE
Payer: MEDICARE

## 2021-05-11 VITALS
OXYGEN SATURATION: 95 % | SYSTOLIC BLOOD PRESSURE: 120 MMHG | HEART RATE: 67 BPM | RESPIRATION RATE: 16 BRPM | DIASTOLIC BLOOD PRESSURE: 64 MMHG | WEIGHT: 167 LBS | TEMPERATURE: 98 F | BODY MASS INDEX: 25 KG/M2

## 2021-05-11 DIAGNOSIS — R07.81 RIB PAIN ON LEFT SIDE: ICD-10-CM

## 2021-05-11 DIAGNOSIS — Z86.010 HISTORY OF ADENOMATOUS POLYP OF COLON: ICD-10-CM
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
## 2021-05-11 DIAGNOSIS — R74.8 ELEVATED ALKALINE PHOSPHATASE LEVEL: ICD-10-CM

## 2021-05-11 DIAGNOSIS — R26.89 BALANCE DISORDER: ICD-10-CM

## 2021-05-11 DIAGNOSIS — S09.90XA TRAUMATIC INJURY OF HEAD, INITIAL ENCOUNTER: ICD-10-CM
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
## 2021-05-11 DIAGNOSIS — Z96.89 S/P INSERTION OF SPINAL CORD STIMULATOR: ICD-10-CM

## 2021-05-11 DIAGNOSIS — I25.10 CORONARY ARTERY DISEASE INVOLVING NATIVE CORONARY ARTERY OF NATIVE HEART WITHOUT ANGINA PECTORIS: ICD-10-CM

## 2021-05-11 DIAGNOSIS — I65.29 STENOSIS OF CAROTID ARTERY, UNSPECIFIED LATERALITY: ICD-10-CM

## 2021-05-11 DIAGNOSIS — E78.5 HYPERLIPIDEMIA, UNSPECIFIED HYPERLIPIDEMIA TYPE: ICD-10-CM

## 2021-05-11 DIAGNOSIS — I71.4 ABDOMINAL AORTIC ANEURYSM (AAA) WITHOUT RUPTURE (HCC): ICD-10-CM

## 2021-05-11 DIAGNOSIS — Z85.810 HISTORY OF TONGUE CANCER: ICD-10-CM

## 2021-05-11 PROCEDURE — 3074F SYST BP LT 130 MM HG: CPT | Performed by: INTERNAL MEDICINE

## 2021-05-11 PROCEDURE — 99214 OFFICE O/P EST MOD 30 MIN: CPT | Performed by: INTERNAL MEDICINE

## 2021-05-11 PROCEDURE — 71101 X-RAY EXAM UNILAT RIBS/CHEST: CPT | Performed by: INTERNAL MEDICINE

## 2021-05-11 PROCEDURE — 70450 CT HEAD/BRAIN W/O DYE: CPT | Performed by: INTERNAL MEDICINE

## 2021-05-11 PROCEDURE — 3078F DIAST BP <80 MM HG: CPT | Performed by: INTERNAL MEDICINE

## 2021-05-11 RX ORDER — ACETAMINOPHEN 500 MG
500 TABLET ORAL EVERY 6 HOURS PRN
COMMUNITY

## 2021-05-11 RX ORDER — OMEGA-3 FATTY ACIDS/FISH OIL 300-1000MG
CAPSULE ORAL
COMMUNITY
End: 2021-09-20

## 2021-05-11 NOTE — TELEPHONE ENCOUNTER
Discussed with Lauren Fairchild. Reviewed 3 rib fractures that Adeline Sink. Discussed pain control.   Advised Tylenol 500 mg 2 tablets twice a day i.e. 2 tablets in the morning and 2 tablets afternoon and just a half a Norco at night as the full Norco causes him

## 2021-05-11 NOTE — PROGRESS NOTES
Milagro Mccarthy is a 67year old male. HPI:   Patient presents with:  Fall: Pt fell down 10 stairs last Wednesday night. Went to ER next AM. Reports no fractures. Did hit head, does c/o dizziness.  CT brain completed this AM.      Blessing Jim was taking some about getting rib x-rays and he will consider this. Orders given to him. He will be getting a colonoscopy with Dr. Nye Ba June 14.   He had a urine culture showing 50-99,000 of streptococcus gallolyticus pasteuranus  This is in the strep bovis class Hypomagnesemia    • Low TSH level    • Low TSH level    • Lung disease     Asbestos related lung disease   • Nonspecific abnormal serum enzyme levels 4/25/2013   • Orthostatic hypotension    • Orthostatic hypotension    • Other and unspecified hyperlipidem encounter  Discussed getting rib x-rays although CT of chest failed to show any rib fractures. - XR RIBS WITH CHEST (3 VIEWS), LEFT  (CPT=71101); Future    2. Stenosis of carotid artery, unspecified laterality  Ultrasound of carotid arteries.   - [address]

## 2021-05-25 VITALS
BODY MASS INDEX: 23.4 KG/M2 | SYSTOLIC BLOOD PRESSURE: 106 MMHG | HEIGHT: 69 IN | OXYGEN SATURATION: 99 % | HEART RATE: 60 BPM | WEIGHT: 158 LBS | DIASTOLIC BLOOD PRESSURE: 60 MMHG

## 2021-06-02 ENCOUNTER — HOSPITAL ENCOUNTER (OUTPATIENT)
Dept: ULTRASOUND IMAGING | Facility: HOSPITAL | Age: 76
Discharge: HOME | End: 2021-06-02
Attending: INTERNAL MEDICINE
Payer: MEDICARE

## 2021-06-02 ENCOUNTER — TELEPHONE (OUTPATIENT)
Dept: INTERNAL MEDICINE CLINIC | Facility: CLINIC | Age: 76
End: 2021-06-02

## 2021-06-02 DIAGNOSIS — I65.29 STENOSIS OF CAROTID ARTERY, UNSPECIFIED LATERALITY: ICD-10-CM

## 2021-06-02 PROCEDURE — 93880 EXTRACRANIAL BILAT STUDY: CPT | Performed by: INTERNAL MEDICINE

## 2021-06-07 ENCOUNTER — OFFICE VISIT (OUTPATIENT)
Dept: UROLOGY | Age: 76
End: 2021-06-07

## 2021-06-07 VITALS
RESPIRATION RATE: 18 BRPM | SYSTOLIC BLOOD PRESSURE: 116 MMHG | DIASTOLIC BLOOD PRESSURE: 75 MMHG | BODY MASS INDEX: 25.01 KG/M2 | WEIGHT: 165 LBS | HEIGHT: 68 IN | HEART RATE: 61 BPM

## 2021-06-07 DIAGNOSIS — R35.1 NOCTURIA: ICD-10-CM

## 2021-06-07 DIAGNOSIS — R31.29 MICROSCOPIC HEMATURIA: ICD-10-CM

## 2021-06-07 DIAGNOSIS — Z87.440 HISTORY OF UTI: ICD-10-CM

## 2021-06-07 DIAGNOSIS — N32.81 OVERACTIVE BLADDER: Primary | ICD-10-CM

## 2021-06-07 LAB
APPEARANCE, POC: CLEAR
BILIRUBIN, POC: NEGATIVE
BLDR SCAN MLS: NORMAL
COLOR, POC: YELLOW
GLUCOSE UR-MCNC: NEGATIVE MG/DL
KETONES, POC: NEGATIVE MG/DL
NITRITE, POC: NEGATIVE
OCCULT BLOOD, POC: ABNORMAL
PH UR: 5.5 [PH] (ref 5–7)
PROT UR-MCNC: ABNORMAL MG/DL
SP GR UR: 1.02 (ref 1–1.03)
UROBILINOGEN UR-MCNC: 0.2 MG/DL (ref 0–1)
WBC (LEUKOCYTE) ESTERASE, POC: ABNORMAL

## 2021-06-07 PROCEDURE — 51798 US URINE CAPACITY MEASURE: CPT | Performed by: UROLOGY

## 2021-06-07 PROCEDURE — 99205 OFFICE O/P NEW HI 60 MIN: CPT | Performed by: UROLOGY

## 2021-06-07 PROCEDURE — 81003 URINALYSIS AUTO W/O SCOPE: CPT | Performed by: UROLOGY

## 2021-06-07 RX ORDER — ACETAMINOPHEN 500 MG
500 TABLET ORAL
COMMUNITY

## 2021-06-07 RX ORDER — OXYBUTYNIN CHLORIDE 10 MG/1
10 TABLET, EXTENDED RELEASE ORAL DAILY
Qty: 30 TABLET | Refills: 0 | Status: SHIPPED | OUTPATIENT
Start: 2021-06-07 | End: 2021-08-30 | Stop reason: ALTCHOICE

## 2021-06-07 RX ORDER — OXYBUTYNIN CHLORIDE 10 MG/1
10 TABLET, EXTENDED RELEASE ORAL DAILY
Qty: 90 TABLET | OUTPATIENT
Start: 2021-06-07

## 2021-06-07 ASSESSMENT — PAIN SCALES - GENERAL: PAINLEVEL: 0

## 2021-06-11 ENCOUNTER — LAB REQUISITION (OUTPATIENT)
Dept: LAB | Facility: HOSPITAL | Age: 76
End: 2021-06-11
Payer: MEDICARE

## 2021-06-11 DIAGNOSIS — Z01.818 ENCOUNTER FOR OTHER PREPROCEDURAL EXAMINATION: ICD-10-CM

## 2021-06-14 ENCOUNTER — LAB REQUISITION (OUTPATIENT)
Dept: LAB | Facility: HOSPITAL | Age: 76
End: 2021-06-14
Payer: MEDICARE

## 2021-06-14 DIAGNOSIS — Z12.11 ENCOUNTER FOR SCREENING FOR MALIGNANT NEOPLASM OF COLON: ICD-10-CM

## 2021-06-14 PROCEDURE — 88305 TISSUE EXAM BY PATHOLOGIST: CPT | Performed by: SURGERY

## 2021-07-06 ENCOUNTER — LAB ENCOUNTER (OUTPATIENT)
Dept: LAB | Facility: HOSPITAL | Age: 76
End: 2021-07-06
Attending: Other
Payer: MEDICARE

## 2021-07-06 ENCOUNTER — OFFICE VISIT (OUTPATIENT)
Dept: NEUROLOGY | Facility: CLINIC | Age: 76
End: 2021-07-06
Payer: MEDICARE

## 2021-07-06 VITALS
HEART RATE: 60 BPM | WEIGHT: 167 LBS | HEIGHT: 68 IN | BODY MASS INDEX: 25.31 KG/M2 | DIASTOLIC BLOOD PRESSURE: 58 MMHG | SYSTOLIC BLOOD PRESSURE: 102 MMHG

## 2021-07-06 DIAGNOSIS — G62.9 LENGTH-DEPENDENT PERIPHERAL NEUROPATHY: ICD-10-CM

## 2021-07-06 DIAGNOSIS — S06.0X0D CONCUSSION WITHOUT LOSS OF CONSCIOUSNESS, SUBSEQUENT ENCOUNTER: ICD-10-CM

## 2021-07-06 DIAGNOSIS — G62.9 LENGTH-DEPENDENT PERIPHERAL NEUROPATHY: Primary | ICD-10-CM
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
## 2021-07-06 LAB
EST. AVERAGE GLUCOSE BLD GHB EST-MCNC: 123 MG/DL (ref 68–126)
HBA1C MFR BLD HPLC: 5.9 % (ref ?–5.7)
TSI SER-ACNC: 2.96 MIU/ML (ref 0.36–3.74)
VIT B12 SERPL-MCNC: 614 PG/ML (ref 193–986)

## 2021-07-06 PROCEDURE — 84446 ASSAY OF VITAMIN E: CPT

## 2021-07-06 PROCEDURE — 3008F BODY MASS INDEX DOCD: CPT | Performed by: OTHER

## 2021-07-06 PROCEDURE — 99204 OFFICE O/P NEW MOD 45 MIN: CPT | Performed by: OTHER

## 2021-07-06 PROCEDURE — 3078F DIAST BP <80 MM HG: CPT | Performed by: OTHER

## 2021-07-06 PROCEDURE — 83036 HEMOGLOBIN GLYCOSYLATED A1C: CPT

## 2021-07-06 PROCEDURE — 3074F SYST BP LT 130 MM HG: CPT | Performed by: OTHER

## 2021-07-06 PROCEDURE — 82525 ASSAY OF COPPER: CPT

## 2021-07-06 PROCEDURE — 84443 ASSAY THYROID STIM HORMONE: CPT

## 2021-07-06 PROCEDURE — 83825 ASSAY OF MERCURY: CPT

## 2021-07-06 PROCEDURE — 84425 ASSAY OF VITAMIN B-1: CPT

## 2021-07-06 PROCEDURE — 84207 ASSAY OF VITAMIN B-6: CPT

## 2021-07-06 PROCEDURE — 83921 ORGANIC ACID SINGLE QUANT: CPT

## 2021-07-06 PROCEDURE — 82175 ASSAY OF ARSENIC: CPT

## 2021-07-06 PROCEDURE — 82607 VITAMIN B-12: CPT

## 2021-07-06 PROCEDURE — 83883 ASSAY NEPHELOMETRY NOT SPEC: CPT

## 2021-07-06 PROCEDURE — 86334 IMMUNOFIX E-PHORESIS SERUM: CPT

## 2021-07-06 PROCEDURE — 82300 ASSAY OF CADMIUM: CPT

## 2021-07-06 PROCEDURE — 83655 ASSAY OF LEAD: CPT

## 2021-07-06 PROCEDURE — 36415 COLL VENOUS BLD VENIPUNCTURE: CPT

## 2021-07-06 RX ORDER — OXYBUTYNIN CHLORIDE 10 MG/1
10 TABLET, EXTENDED RELEASE ORAL DAILY
COMMUNITY
Start: 2021-06-07 | End: 2021-08-04

## 2021-07-06 NOTE — PATIENT INSTRUCTIONS
-Please obtain laboratory blood work   -Physical therapy referral for fall and neuropathy   -Follow up in 1 month or sooner if needed.     -If you develop sudden onset loss of vision, double vision, room spinning/world spinning sensation, inability to speak

## 2021-07-06 NOTE — PROGRESS NOTES
[facility]  [address]  [phone]              Date: July 6, 2021  Patient Name: Orion Multani   MRN: [id number]    Reason for Evaluation: Gait instability    HPI:     Orion Multani headaches, 3-4 times since his fall. He will treat these with Tylenol or Ibuprofen. Frontal, not throbbing or dull, just \"normal\" headache. He has abnormal sensations along the bottom of his feet \"walking on cobblestones\" and tingling sensations. MG-UNIT Oral Tab, Take 1 tablet by mouth 2 (two) times daily. , Disp: , Rfl:     No current facility-administered medications on file prior to visit.       MEDICAL HISTORY  Past Medical History:   Diagnosis Date   • AAA (abdominal aortic aneurysm) ([facility] [address]    • CAD   • Heart Attack Brother 62        MI   • Heart Disease Mother         CAD   • Heart Surgery Mother [de-identified]        CABG   • Heart Disease Other         CAD       ALLERGIES  No Known Allergies    REVIEW OF SYSTEMS:   13-point review of systems was done and i Plantarflexion  Gastroc, Soleus  S1 Ankle inverters  Tib Post  L4-5  Ankle everters   L5-S1   5 5 5 5 5 5 5   5 5 5 5 5 5 5       Sensory:   Light touch: intact in all 4 extremities.    Pain:  Lost in LE bilaterally   Vibration: Lost in LE bilaterally   Pro 10.0 - 20.0 21.2 (H)   CALCIUM      8.5 - 10.1 mg/dL 9.4   CALCULATED OSMOLALITY      275 - 295 mOsm/kg 302 (H)   eGFR NON-AFR.  AMERICAN      >=60 60   eGFR AFRICAN AMERICAN      >=60 69   ALT (SGPT)      16 - 61 U/L 26   AST (SGOT)      15 - 37 U/L 16   A Follow up: 1 month due to concussion     Discussed indication, administration, dose, and side effects with patient of any medications personally prescribed. Patient was advised to let my office know if they have any questions or concerns.      Today, I pers

## 2021-07-07 ENCOUNTER — APPOINTMENT (OUTPATIENT)
Dept: UROLOGY | Age: 76
End: 2021-07-07

## 2021-07-08 ENCOUNTER — LAB SERVICES (OUTPATIENT)
Dept: LAB | Age: 76
End: 2021-07-08
Attending: UROLOGY

## 2021-07-08 ENCOUNTER — PATIENT MESSAGE (OUTPATIENT)
Dept: INTERNAL MEDICINE CLINIC | Facility: CLINIC | Age: 76
End: 2021-07-08

## 2021-07-08 DIAGNOSIS — Z87.440 PERSONAL HISTORY OF URINARY (TRACT) INFECTION: ICD-10-CM

## 2021-07-08 DIAGNOSIS — D47.2 MONOCLONAL GAMMOPATHY OF UNKNOWN SIGNIFICANCE (MGUS): Primary | ICD-10-CM

## 2021-07-08 DIAGNOSIS — R31.29 MICROSCOPIC HEMATURIA: Primary | ICD-10-CM

## 2021-07-08 LAB
APPEARANCE UR: CLEAR
ARSENIC, BLOOD: <10 UG/L
BACTERIA #/AREA URNS HPF: ABNORMAL /HPF
BILIRUB UR QL STRIP: NEGATIVE
CADMIUM, BLOOD: <1 UG/L
COLOR UR: YELLOW
COPPER, SERUM: 113.2 UG/DL
GLUCOSE UR STRIP-MCNC: NEGATIVE MG/DL
HGB UR QL STRIP: NEGATIVE
HYALINE CASTS #/AREA URNS LPF: ABNORMAL /LPF
KAPPA LC FREE SER-MCNC: 3.54 MG/DL (ref 0.33–1.94)
KAPPA LC FREE/LAMBDA FREE SER NEPH: 1.9 {RATIO} (ref 0.26–1.65)
KETONES UR STRIP-MCNC: NEGATIVE MG/DL
LAMBDA LC FREE SERPL-MCNC: 1.86 MG/DL (ref 0.57–2.63)
LEAD, BLOOD (VENOUS): <2 UG/DL
LEUKOCYTE ESTERASE UR QL STRIP: ABNORMAL
MERCURY, BLOOD: <2.5 UG/L
MMA: 0.13 UMOL/L
NITRITE UR QL STRIP: NEGATIVE
PH UR STRIP: 7 [PH] (ref 5–7)
PROT UR STRIP-MCNC: NEGATIVE MG/DL
RBC #/AREA URNS HPF: ABNORMAL /HPF
SP GR UR STRIP: <1.005 (ref 1–1.03)
SQUAMOUS #/AREA URNS HPF: ABNORMAL /HPF
UROBILINOGEN UR STRIP-MCNC: 0.2 MG/DL
WBC #/AREA URNS HPF: ABNORMAL /HPF

## 2021-07-08 PROCEDURE — 87086 URINE CULTURE/COLONY COUNT: CPT

## 2021-07-08 PROCEDURE — 81001 URINALYSIS AUTO W/SCOPE: CPT

## 2021-07-08 NOTE — TELEPHONE ENCOUNTER
From: Carlyle Cleaning  To: Yecenia Mantilla MD  Sent: 7/8/2021 11:30 AM CDT  Subject: Test Results Question    Just reviewed the results of the Kappa Free Light Chain - my value is 3.544 mg/dl which is out of range.  Looked it up online with the following

## 2021-07-09 ENCOUNTER — OFFICE VISIT (OUTPATIENT)
Dept: UROLOGY | Age: 76
End: 2021-07-09

## 2021-07-09 VITALS
SYSTOLIC BLOOD PRESSURE: 109 MMHG | HEIGHT: 68 IN | TEMPERATURE: 98.3 F | DIASTOLIC BLOOD PRESSURE: 75 MMHG | WEIGHT: 163 LBS | BODY MASS INDEX: 24.71 KG/M2 | RESPIRATION RATE: 18 BRPM | HEART RATE: 60 BPM | OXYGEN SATURATION: 98 %

## 2021-07-09 DIAGNOSIS — N32.81 OVERACTIVE BLADDER: ICD-10-CM

## 2021-07-09 DIAGNOSIS — N32.81 OVERACTIVE BLADDER: Primary | ICD-10-CM

## 2021-07-09 LAB
ALPHA-TOCOPHEROL (VIT E) -MG/L: 9.9 MG/L
BACTERIA UR CULT: NORMAL
BLDR SCAN MLS: 44
GAMMA-TOCOPHEROL (VIT E) -MG/L: 0.3 MG/L
VITAMIN B1 (THIAMINE), WHOLE B: 147 NMOL/L
VITAMIN B6: 91.2 NMOL/L

## 2021-07-09 PROCEDURE — 51798 US URINE CAPACITY MEASURE: CPT | Performed by: UROLOGY

## 2021-07-09 PROCEDURE — 99215 OFFICE O/P EST HI 40 MIN: CPT | Performed by: UROLOGY

## 2021-07-09 RX ORDER — BISACODYL 5 MG
TABLET, DELAYED RELEASE (ENTERIC COATED) ORAL
COMMUNITY
Start: 2021-06-09 | End: 2021-08-30 | Stop reason: ALTCHOICE

## 2021-07-09 RX ORDER — OXYBUTYNIN CHLORIDE 15 MG/1
15 TABLET, EXTENDED RELEASE ORAL DAILY
Qty: 30 TABLET | Refills: 0 | Status: SHIPPED | OUTPATIENT
Start: 2021-07-09 | End: 2023-08-21 | Stop reason: ALTCHOICE

## 2021-07-09 NOTE — TELEPHONE ENCOUNTER
Please call patient and let him know I appreciate his MyChart messages.   From his last message, it is good that the immunofixation came out normal.  However I still asked him to see hematologist just to make sure that there is nothing that needs to be done

## 2021-07-12 ENCOUNTER — TELEPHONE (OUTPATIENT)
Dept: PHYSICAL THERAPY | Facility: HOSPITAL | Age: 76
End: 2021-07-12

## 2021-07-12 RX ORDER — OXYBUTYNIN CHLORIDE 15 MG/1
15 TABLET, EXTENDED RELEASE ORAL DAILY
Qty: 90 TABLET | OUTPATIENT
Start: 2021-07-12 | End: 2021-08-11

## 2021-07-13 ENCOUNTER — OFFICE VISIT (OUTPATIENT)
Dept: PHYSICAL THERAPY | Facility: HOSPITAL | Age: 76
End: 2021-07-13
Attending: Other
Payer: MEDICARE

## 2021-07-13 DIAGNOSIS — G62.9 LENGTH-DEPENDENT PERIPHERAL NEUROPATHY: ICD-10-CM

## 2021-07-13 PROCEDURE — 97161 PT EVAL LOW COMPLEX 20 MIN: CPT

## 2021-07-15 ENCOUNTER — APPOINTMENT (OUTPATIENT)
Dept: PHYSICAL THERAPY | Facility: HOSPITAL | Age: 76
End: 2021-07-15
Attending: Other
Payer: MEDICARE

## 2021-07-15 RX ORDER — OXYBUTYNIN CHLORIDE 10 MG/1
10 TABLET, EXTENDED RELEASE ORAL DAILY
Qty: 30 TABLET | Refills: 0 | OUTPATIENT
Start: 2021-07-15

## 2021-07-19 ENCOUNTER — TELEPHONE (OUTPATIENT)
Dept: PHYSICAL THERAPY | Facility: HOSPITAL | Age: 76
End: 2021-07-19

## 2021-07-19 ENCOUNTER — APPOINTMENT (OUTPATIENT)
Dept: PHYSICAL THERAPY | Facility: HOSPITAL | Age: 76
End: 2021-07-19
Attending: Other
Payer: MEDICARE

## 2021-07-22 ENCOUNTER — APPOINTMENT (OUTPATIENT)
Dept: PHYSICAL THERAPY | Facility: HOSPITAL | Age: 76
End: 2021-07-22
Attending: Other
Payer: MEDICARE

## 2021-07-26 ENCOUNTER — OFFICE VISIT (OUTPATIENT)
Dept: PHYSICAL THERAPY | Facility: HOSPITAL | Age: 76
End: 2021-07-26
Attending: Other
Payer: MEDICARE

## 2021-07-26 DIAGNOSIS — G62.9 LENGTH-DEPENDENT PERIPHERAL NEUROPATHY: ICD-10-CM

## 2021-07-26 PROCEDURE — 97112 NEUROMUSCULAR REEDUCATION: CPT

## 2021-07-26 NOTE — PATIENT INSTRUCTIONS
Do these exercises 2 times each day     1. Stand tall at the countertop and hold on lightly. Kick one leg forward, out to the side and backward, keeping leg straight, and balancing. Do 10 times in each direction, then repeat on the other leg.     2. Stand

## 2021-08-02 ENCOUNTER — OFFICE VISIT (OUTPATIENT)
Dept: PHYSICAL THERAPY | Facility: HOSPITAL | Age: 76
End: 2021-08-02
Attending: Other
Payer: MEDICARE

## 2021-08-02 DIAGNOSIS — G62.9 LENGTH-DEPENDENT PERIPHERAL NEUROPATHY: ICD-10-CM

## 2021-08-02 PROCEDURE — 97112 NEUROMUSCULAR REEDUCATION: CPT

## 2021-08-02 NOTE — PATIENT INSTRUCTIONS
Do these exercises 2 times each day     1. Stand tall at the countertop and hold on lightly. Kick one leg forward, out to the side and backward, keeping leg straight, and balancing. Do 10 times in each direction, then repeat on the other leg.     2. Place

## 2021-08-02 NOTE — PROGRESS NOTES
Diagnosis: imbalance   Visit (# authorized):  5 per [facility])                        Referring Physician: Amari Malhotra    Precautions: at risk for falls     Medication changes since last visit?:  No    Subjective: Reports compliance to HEP.      Objective:

## 2021-08-04 ENCOUNTER — OFFICE VISIT (OUTPATIENT)
Dept: NEUROLOGY | Facility: CLINIC | Age: 76
End: 2021-08-04
Payer: MEDICARE

## 2021-08-04 VITALS
BODY MASS INDEX: 24.4 KG/M2 | HEIGHT: 68 IN | SYSTOLIC BLOOD PRESSURE: 118 MMHG | WEIGHT: 161 LBS | DIASTOLIC BLOOD PRESSURE: 66 MMHG | HEART RATE: 60 BPM

## 2021-08-04 DIAGNOSIS — S06.0X9D CONCUSSION WITH LOSS OF CONSCIOUSNESS, SUBSEQUENT ENCOUNTER: ICD-10-CM

## 2021-08-04 DIAGNOSIS — G62.9 LENGTH-DEPENDENT PERIPHERAL NEUROPATHY: Primary | ICD-10-CM

## 2021-08-04 PROCEDURE — 3074F SYST BP LT 130 MM HG: CPT | Performed by: OTHER

## 2021-08-04 PROCEDURE — 99213 OFFICE O/P EST LOW 20 MIN: CPT | Performed by: OTHER

## 2021-08-04 PROCEDURE — 3078F DIAST BP <80 MM HG: CPT | Performed by: OTHER

## 2021-08-04 PROCEDURE — 3008F BODY MASS INDEX DOCD: CPT | Performed by: OTHER

## 2021-08-04 RX ORDER — OXYBUTYNIN CHLORIDE 15 MG/1
15 TABLET, EXTENDED RELEASE ORAL DAILY
COMMUNITY
Start: 2021-07-09 | End: 2021-08-08

## 2021-08-04 NOTE — PATIENT INSTRUCTIONS
-Podiatry referral for neuropathy  -Physical therapy   -Hematology   -Follow up in 6 months or sooner if needed.     -If you develop sudden onset loss of vision, double vision, room spinning/world spinning sensation, inability to speak or understand others

## 2021-08-04 NOTE — PROGRESS NOTES
[facility]  [address]  [phone]          Established  Follow Up Visit       Date: August 4, 2021  Patient Name: Emma Riggs   MRN: [id number]  Primary physician: [address] [place] [address] Multiple Vitamins-Minerals (ICAPS AREDS FORMULA) Oral Tab, Take 1 tablet by mouth daily. , Disp: , Rfl:   aspirin 81 MG Oral Tab, Take 81 mg by mouth daily. , Disp: , Rfl:   Vitamin C (VITAMIN C) 500 MG Oral Tab, Take 500 mg by mouth daily. , Disp: , Rfl:   M Vitamin B12      193 - 986 pg/mL 614   VITAMIN B6 (PYRIDOXAL 5-PHOSPH      20.0 - 125.0 nmol/L 91.2         IMAGING:  N/A    ASSESSMENT:  The patient is a 67year old man with past medical history of orthostatic hypotension, lumbar spondylosis with possibl minutes in this case, including chart review, time spent with patient doing face to face evaluation w/ interview and exam and patient education, counseling, and time was spent in patient education, counseling, and coordination of care as described above.

## 2021-08-05 ENCOUNTER — APPOINTMENT (OUTPATIENT)
Dept: PHYSICAL THERAPY | Facility: HOSPITAL | Age: 76
End: 2021-08-05
Attending: Other
Payer: MEDICARE

## 2021-08-05 ENCOUNTER — TELEPHONE (OUTPATIENT)
Dept: HEMATOLOGY/ONCOLOGY | Facility: HOSPITAL | Age: 76
End: 2021-08-05

## 2021-08-05 NOTE — TELEPHONE ENCOUNTER
Changed patient lab appointment to after consult with Dr. Jeremy Marcelo. Called patient to let him know that he should arrive at 9:00am for consult with Dr. Jeremy Marcelo and will have a lab appointment rescheduled for after consult if Dr. Jeremy Marcelo orders any blood work.   Patient

## 2021-08-06 ENCOUNTER — OFFICE VISIT (OUTPATIENT)
Dept: HEMATOLOGY/ONCOLOGY | Facility: HOSPITAL | Age: 76
End: 2021-08-06
Attending: INTERNAL MEDICINE
Payer: MEDICARE

## 2021-08-06 VITALS
OXYGEN SATURATION: 98 % | SYSTOLIC BLOOD PRESSURE: 129 MMHG | WEIGHT: 162 LBS | HEART RATE: 56 BPM | HEIGHT: 68 IN | DIASTOLIC BLOOD PRESSURE: 64 MMHG | TEMPERATURE: 98 F | RESPIRATION RATE: 16 BRPM | BODY MASS INDEX: 24.55 KG/M2

## 2021-08-06 DIAGNOSIS — R76.8 ELEVATED SERUM IMMUNOGLOBULIN FREE LIGHT CHAIN LEVEL: ICD-10-CM

## 2021-08-06 DIAGNOSIS — R76.8 ELEVATED SERUM IMMUNOGLOBULIN FREE LIGHT CHAIN LEVEL: Primary | ICD-10-CM

## 2021-08-06 LAB
IGA SERPL-MCNC: 322 MG/DL (ref 70–312)
IGM SERPL-MCNC: 19.5 MG/DL (ref 43–279)
IMMUNOGLOBULIN PNL SER-MCNC: 1130 MG/DL (ref 791–1643)

## 2021-08-06 PROCEDURE — 36415 COLL VENOUS BLD VENIPUNCTURE: CPT

## 2021-08-06 PROCEDURE — 82784 ASSAY IGA/IGD/IGG/IGM EACH: CPT

## 2021-08-06 PROCEDURE — 86334 IMMUNOFIX E-PHORESIS SERUM: CPT

## 2021-08-06 PROCEDURE — 83883 ASSAY NEPHELOMETRY NOT SPEC: CPT

## 2021-08-06 PROCEDURE — 84165 PROTEIN E-PHORESIS SERUM: CPT

## 2021-08-06 PROCEDURE — 99203 OFFICE O/P NEW LOW 30 MIN: CPT | Performed by: INTERNAL MEDICINE

## 2021-08-06 NOTE — CONSULTS
TITA Wang is a 67year old male here at the request of Dafne Lindo MD for evaluation of Elevated serum immunoglobulin free light chain level  (primary encounter diagnosis)    Patient was evaluated by neurology for gait instability and Medications   Medication Sig Dispense Refill   • Oxybutynin Chloride ER 15 MG Oral Tablet 24 Hr Take 15 mg by mouth daily. • acetaminophen 500 MG Oral Tab Take 500 mg by mouth every 6 (six) hours as needed for Pain.      • Ibuprofen (ADVIL) 200 MG Oral SURGICAL HISTORY  8/2014    Excision of Tongue Lesion   • OTHER SURGICAL HISTORY      repair abdominal aneurysm   • OTHER SURGICAL HISTORY  11/2020    spinal cord stimulator   • TONSILLECTOMY       Social History    Tobacco Use      Smoking status: Former regions. Psych/Depression: nl        ASSESSMENT/PLAN:   Elevated serum immunoglobulin free light chain level  (primary encounter diagnosis)    Erlinda Morales is a 67year old male with a elevated kappa free light chain.         Recent CBC at [facility])           Collected:  12/14/2020 12:24   Status:  Final result   Dx:  Pre-op evaluation; Coronary artery di. ..   1 Result Note  Component   Ref Range & Units 12/14/20 1224   WBC   4.0 - 11.0 x10(3) uL 5.7    RBC   3.80 - 5.80 x10(6)uL 4. 6.4 - 8.2 g/dL 6.9    Albumin   3.4 - 5.0 g/dL 3.3Low     Globulin   2.8 - 4.4 g/dL 3.6    A/G Ratio   1.0 - 2.0 0.9Low     FASTING  Yes    Resulting Agency [facility])         Specimen Collected: 12/14/20 12:24 Last Resulted: 12/14/20 17:04

## 2021-08-09 ENCOUNTER — APPOINTMENT (OUTPATIENT)
Dept: UROLOGY | Age: 76
End: 2021-08-09

## 2021-08-09 LAB
KAPPA LC FREE SER-MCNC: 3.05 MG/DL (ref 0.33–1.94)
KAPPA LC FREE/LAMBDA FREE SER NEPH: 1.95 {RATIO} (ref 0.26–1.65)
LAMBDA LC FREE SERPL-MCNC: 1.57 MG/DL (ref 0.57–2.63)

## 2021-08-10 LAB
ALBUMIN SERPL ELPH-MCNC: 3.81 G/DL (ref 3.75–5.21)
ALBUMIN/GLOB SERPL: 1.23 {RATIO} (ref 1–2)
ALPHA1 GLOB SERPL ELPH-MCNC: 0.34 G/DL (ref 0.19–0.46)
ALPHA2 GLOB SERPL ELPH-MCNC: 0.77 G/DL (ref 0.48–1.05)
B-GLOBULIN SERPL ELPH-MCNC: 0.92 G/DL (ref 0.68–1.23)
GAMMA GLOB SERPL ELPH-MCNC: 1.07 G/DL (ref 0.62–1.7)
M PROTEIN MFR SERPL ELPH: 6.9 G/DL (ref 6.4–8.2)

## 2021-08-11 ENCOUNTER — OFFICE VISIT (OUTPATIENT)
Dept: PHYSICAL THERAPY | Facility: HOSPITAL | Age: 76
End: 2021-08-11
Attending: Other
Payer: MEDICARE

## 2021-08-11 DIAGNOSIS — G62.9 LENGTH-DEPENDENT PERIPHERAL NEUROPATHY: ICD-10-CM

## 2021-08-11 PROCEDURE — 97112 NEUROMUSCULAR REEDUCATION: CPT

## 2021-08-11 NOTE — PROGRESS NOTES
Discharge Summary    Referring Physician: Percy Sparks    Diagnosis:  imbalance     Pt has attended 4 visits in Physical Therapy. Subjective: Pt feels as if he has improved in regard to balance. Has been compliant to HEP.  Denies falls or sensation of LOB in ba participate in planning and for this course of care. Thank you for your referral. If you have any questions, please contact me at Dept: [phone].     Sincerely,    Rhoda Kumar PT, NCS    Electronically signed by therapist: Rhoda Kumar PT

## 2021-08-13 ENCOUNTER — OFFICE VISIT (OUTPATIENT)
Dept: UROLOGY | Age: 76
End: 2021-08-13

## 2021-08-13 VITALS
WEIGHT: 163 LBS | HEART RATE: 64 BPM | SYSTOLIC BLOOD PRESSURE: 94 MMHG | BODY MASS INDEX: 24.71 KG/M2 | DIASTOLIC BLOOD PRESSURE: 59 MMHG | HEIGHT: 68 IN

## 2021-08-13 DIAGNOSIS — N32.81 OVERACTIVE BLADDER: Primary | ICD-10-CM

## 2021-08-13 LAB — BLDR SCAN MLS: NORMAL

## 2021-08-13 PROCEDURE — 51798 US URINE CAPACITY MEASURE: CPT | Performed by: UROLOGY

## 2021-08-13 PROCEDURE — 99215 OFFICE O/P EST HI 40 MIN: CPT | Performed by: UROLOGY

## 2021-08-13 RX ORDER — OXYBUTYNIN CHLORIDE 15 MG/1
15 TABLET, EXTENDED RELEASE ORAL DAILY
Qty: 90 TABLET | Refills: 3 | Status: SHIPPED | OUTPATIENT
Start: 2021-08-13 | End: 2022-08-19 | Stop reason: SDUPTHER

## 2021-09-20 ENCOUNTER — TELEPHONE (OUTPATIENT)
Dept: INTERNAL MEDICINE CLINIC | Facility: CLINIC | Age: 76
End: 2021-09-20

## 2021-09-20 ENCOUNTER — OFFICE VISIT (OUTPATIENT)
Dept: INTERNAL MEDICINE CLINIC | Facility: CLINIC | Age: 76
End: 2021-09-20
Payer: MEDICARE

## 2021-09-20 VITALS
DIASTOLIC BLOOD PRESSURE: 80 MMHG | OXYGEN SATURATION: 97 % | WEIGHT: 160 LBS | TEMPERATURE: 98 F | HEIGHT: 68 IN | HEART RATE: 81 BPM | SYSTOLIC BLOOD PRESSURE: 122 MMHG | BODY MASS INDEX: 24.25 KG/M2

## 2021-09-20 DIAGNOSIS — Z85.810 HISTORY OF TONGUE CANCER: ICD-10-CM

## 2021-09-20 DIAGNOSIS — G62.9 NEUROPATHY: ICD-10-CM

## 2021-09-20 DIAGNOSIS — I71.4 ABDOMINAL AORTIC ANEURYSM (AAA) WITHOUT RUPTURE (HCC): ICD-10-CM

## 2021-09-20 DIAGNOSIS — Z86.010 HISTORY OF ADENOMATOUS POLYP OF COLON: ICD-10-CM

## 2021-09-20 DIAGNOSIS — R74.8 ELEVATED ALKALINE PHOSPHATASE LEVEL: ICD-10-CM

## 2021-09-20 DIAGNOSIS — E78.5 HYPERLIPIDEMIA, UNSPECIFIED HYPERLIPIDEMIA TYPE: ICD-10-CM

## 2021-09-20 DIAGNOSIS — Z00.00 MEDICARE ANNUAL WELLNESS VISIT, SUBSEQUENT: Primary | ICD-10-CM

## 2021-09-20 DIAGNOSIS — Z96.89 S/P INSERTION OF SPINAL CORD STIMULATOR: ICD-10-CM

## 2021-09-20 DIAGNOSIS — I25.10 CORONARY ARTERY DISEASE INVOLVING NATIVE CORONARY ARTERY OF NATIVE HEART WITHOUT ANGINA PECTORIS: ICD-10-CM

## 2021-09-20 DIAGNOSIS — Z00.00 ROUTINE HEALTH MAINTENANCE: ICD-10-CM

## 2021-09-20 PROCEDURE — G0008 ADMIN INFLUENZA VIRUS VAC: HCPCS | Performed by: INTERNAL MEDICINE

## 2021-09-20 PROCEDURE — 90662 IIV NO PRSV INCREASED AG IM: CPT | Performed by: INTERNAL MEDICINE

## 2021-09-20 PROCEDURE — 96160 PT-FOCUSED HLTH RISK ASSMT: CPT | Performed by: INTERNAL MEDICINE

## 2021-09-20 PROCEDURE — 99397 PER PM REEVAL EST PAT 65+ YR: CPT | Performed by: INTERNAL MEDICINE

## 2021-09-20 PROCEDURE — 3079F DIAST BP 80-89 MM HG: CPT | Performed by: INTERNAL MEDICINE

## 2021-09-20 PROCEDURE — G0439 PPPS, SUBSEQ VISIT: HCPCS | Performed by: INTERNAL MEDICINE

## 2021-09-20 PROCEDURE — 3008F BODY MASS INDEX DOCD: CPT | Performed by: INTERNAL MEDICINE

## 2021-09-20 PROCEDURE — 3074F SYST BP LT 130 MM HG: CPT | Performed by: INTERNAL MEDICINE

## 2021-09-20 RX ORDER — OXYBUTYNIN CHLORIDE 15 MG/1
15 TABLET, EXTENDED RELEASE ORAL DAILY
COMMUNITY
Start: 2021-07-09 | End: 2021-11-11

## 2021-09-20 NOTE — TELEPHONE ENCOUNTER
MD Carmen Lock MD  Caller: Unspecified (Today,  1:58 PM)  Josemanuel Gonzales,     He does not have a monoclonal paraproteinemia.  His elevation of the immunoglobulins and light chains is polyclonal therefore, due to inflammation.       He does not

## 2021-09-20 NOTE — TELEPHONE ENCOUNTER
I Amaryllis. Thank you for taking care of Kellie Alcatnar. I saw your comments on his recent labs. Do he need to see him in 6 months? .  Or repeat any immunoglobulins? .  Thank you in advance. He remains asymptomatic except for his neuropathy.   Elizabeth Mckenna

## 2021-09-20 NOTE — PROGRESS NOTES
Audi Cortes is a 67year old male   HPI:   Pt.presents for the following problems. Lynn Pickard comes in for Medicare annual wellness visit    He feels well. His current weight is 160 pounds.   In July he was on 167 pounds but looking back in September ulcer.  Does not look like anything that needs to be followed up on. Patient does have follow-up with podiatry. He knows to monitor his feet with a mirror every day. Follow-up with Dr. Ender Nelson 6 months to 1 year. Her last visit was August 4, 2021.     Keli Nonspecific abnormal serum enzyme levels 4/25/2013   • Orthostatic hypotension    • Other and unspecified hyperlipidemia    • PEG (percutaneous endoscopic gastrostomy) status (HCC)    • Pleural plaque     calcified pleural plaques   • Pleurisy 4/13/2012 breath or cough  CARDIOVASCULAR :  Voices no chest pain or palpitations  GI:  Voices no abdominal pain, blood in stool or changes in bowel movements. : Voices no blood in urine or changes in stream  MUSCULOSKELETAL: No major increase in pain status.   NE help    Preparing your meals: Able without help    Managing money/bills: Able without help    Taking medications as prescribed: Able without help    Are you able to afford your medications?: Yes    Hearing Problems?: No     Functional Status     Hearing Pr PANEL; Future    3. History of tongue cancer  Follow-up. He has no further follow-up with his tongue cancer physicians. He will keep an eye on if any symptoms return    4. Hyperlipidemia, unspecified hyperlipidemia type  Update lipids.     5. Elevated alk

## 2021-10-14 ENCOUNTER — LAB ENCOUNTER (OUTPATIENT)
Dept: LAB | Age: 76
End: 2021-10-14
Attending: INTERNAL MEDICINE
Payer: MEDICARE

## 2021-10-14 DIAGNOSIS — I25.10 CORONARY ARTERY DISEASE INVOLVING NATIVE CORONARY ARTERY OF NATIVE HEART WITHOUT ANGINA PECTORIS: ICD-10-CM

## 2021-10-14 PROCEDURE — 36415 COLL VENOUS BLD VENIPUNCTURE: CPT

## 2021-10-14 PROCEDURE — 80061 LIPID PANEL: CPT

## 2021-10-14 PROCEDURE — 85025 COMPLETE CBC W/AUTO DIFF WBC: CPT

## 2021-10-14 PROCEDURE — 80053 COMPREHEN METABOLIC PANEL: CPT

## 2021-10-15 ENCOUNTER — TELEPHONE (OUTPATIENT)
Dept: INTERNAL MEDICINE CLINIC | Facility: CLINIC | Age: 76
End: 2021-10-15

## 2021-10-15 NOTE — TELEPHONE ENCOUNTER
Patient called and relayed Dr Yelena Moise message. Patient verbalized understanding with no further questions noted.

## 2022-01-25 ENCOUNTER — APPOINTMENT (OUTPATIENT)
Dept: CARDIOLOGY | Age: 77
End: 2022-01-25

## 2022-03-21 ENCOUNTER — OFFICE VISIT (OUTPATIENT)
Dept: INTERNAL MEDICINE CLINIC | Facility: CLINIC | Age: 77
End: 2022-03-21
Payer: MEDICARE

## 2022-03-21 ENCOUNTER — LAB ENCOUNTER (OUTPATIENT)
Dept: LAB | Age: 77
End: 2022-03-21
Attending: INTERNAL MEDICINE
Payer: MEDICARE

## 2022-03-21 VITALS
SYSTOLIC BLOOD PRESSURE: 116 MMHG | BODY MASS INDEX: 23.79 KG/M2 | TEMPERATURE: 98 F | HEIGHT: 68 IN | DIASTOLIC BLOOD PRESSURE: 64 MMHG | OXYGEN SATURATION: 96 % | WEIGHT: 157 LBS | HEART RATE: 64 BPM

## 2022-03-21 DIAGNOSIS — Z96.89 S/P INSERTION OF SPINAL CORD STIMULATOR: ICD-10-CM

## 2022-03-21 DIAGNOSIS — R63.4 WEIGHT LOSS: ICD-10-CM

## 2022-03-21 DIAGNOSIS — D47.2 MGUS (MONOCLONAL GAMMOPATHY OF UNKNOWN SIGNIFICANCE): ICD-10-CM

## 2022-03-21 DIAGNOSIS — I71.4 ABDOMINAL AORTIC ANEURYSM (AAA) WITHOUT RUPTURE (HCC): ICD-10-CM

## 2022-03-21 DIAGNOSIS — R74.8 ELEVATED ALKALINE PHOSPHATASE LEVEL: ICD-10-CM

## 2022-03-21 DIAGNOSIS — Z00.00 ROUTINE HEALTH MAINTENANCE: ICD-10-CM

## 2022-03-21 DIAGNOSIS — I25.10 CORONARY ARTERY DISEASE INVOLVING NATIVE CORONARY ARTERY OF NATIVE HEART WITHOUT ANGINA PECTORIS: Primary | ICD-10-CM

## 2022-03-21 DIAGNOSIS — Z85.810 HISTORY OF TONGUE CANCER: ICD-10-CM

## 2022-03-21 DIAGNOSIS — E78.5 HYPERLIPIDEMIA, UNSPECIFIED HYPERLIPIDEMIA TYPE: ICD-10-CM

## 2022-03-21 DIAGNOSIS — G62.9 NEUROPATHY: ICD-10-CM

## 2022-03-21 DIAGNOSIS — Z86.010 HISTORY OF ADENOMATOUS POLYP OF COLON: ICD-10-CM

## 2022-03-21 DIAGNOSIS — D47.2 MONOCLONAL GAMMOPATHY OF UNKNOWN SIGNIFICANCE (MGUS): ICD-10-CM

## 2022-03-21 LAB
ANION GAP SERPL CALC-SCNC: 5 MMOL/L (ref 0–18)
BASOPHILS # BLD AUTO: 0.03 X10(3) UL (ref 0–0.2)
BASOPHILS NFR BLD AUTO: 0.6 %
BUN BLD-MCNC: 17 MG/DL (ref 7–18)
BUN/CREAT SERPL: 14.7 (ref 10–20)
CALCIUM BLD-MCNC: 9.1 MG/DL (ref 8.5–10.1)
CHLORIDE SERPL-SCNC: 108 MMOL/L (ref 98–112)
CO2 SERPL-SCNC: 29 MMOL/L (ref 21–32)
CREAT BLD-MCNC: 1.16 MG/DL
DEPRECATED RDW RBC AUTO: 44.6 FL (ref 35.1–46.3)
EOSINOPHIL # BLD AUTO: 0.18 X10(3) UL (ref 0–0.7)
EOSINOPHIL NFR BLD AUTO: 3.6 %
ERYTHROCYTE [DISTWIDTH] IN BLOOD BY AUTOMATED COUNT: 13.5 % (ref 11–15)
FASTING STATUS PATIENT QL REPORTED: NO
GLUCOSE BLD-MCNC: 91 MG/DL (ref 70–99)
HCT VFR BLD AUTO: 40.5 %
HGB BLD-MCNC: 13.2 G/DL
IGA SERPL-MCNC: 284 MG/DL (ref 70–312)
IGM SERPL-MCNC: 22.4 MG/DL (ref 43–279)
IMM GRANULOCYTES # BLD AUTO: 0.01 X10(3) UL (ref 0–1)
IMM GRANULOCYTES NFR BLD: 0.2 %
IMMUNOGLOBULIN PNL SER-MCNC: 1050 MG/DL (ref 791–1643)
LYMPHOCYTES # BLD AUTO: 1.04 X10(3) UL (ref 1–4)
LYMPHOCYTES NFR BLD AUTO: 20.5 %
MCH RBC QN AUTO: 29.5 PG (ref 26–34)
MCHC RBC AUTO-ENTMCNC: 32.6 G/DL (ref 31–37)
MCV RBC AUTO: 90.4 FL
MONOCYTES # BLD AUTO: 0.54 X10(3) UL (ref 0.1–1)
MONOCYTES NFR BLD AUTO: 10.7 %
NEUTROPHILS # BLD AUTO: 3.27 X10 (3) UL (ref 1.5–7.7)
NEUTROPHILS # BLD AUTO: 3.27 X10(3) UL (ref 1.5–7.7)
NEUTROPHILS NFR BLD AUTO: 64.4 %
OSMOLALITY SERPL CALC.SUM OF ELEC: 295 MOSM/KG (ref 275–295)
PLATELET # BLD AUTO: 175 10(3)UL (ref 150–450)
POTASSIUM SERPL-SCNC: 4.3 MMOL/L (ref 3.5–5.1)
RBC # BLD AUTO: 4.48 X10(6)UL
SODIUM SERPL-SCNC: 142 MMOL/L (ref 136–145)
TSI SER-ACNC: 1.84 MIU/ML (ref 0.36–3.74)
WBC # BLD AUTO: 5.1 X10(3) UL (ref 4–11)

## 2022-03-21 PROCEDURE — 3074F SYST BP LT 130 MM HG: CPT | Performed by: INTERNAL MEDICINE

## 2022-03-21 PROCEDURE — 80048 BASIC METABOLIC PNL TOTAL CA: CPT

## 2022-03-21 PROCEDURE — 99214 OFFICE O/P EST MOD 30 MIN: CPT | Performed by: INTERNAL MEDICINE

## 2022-03-21 PROCEDURE — 85025 COMPLETE CBC W/AUTO DIFF WBC: CPT

## 2022-03-21 PROCEDURE — 84443 ASSAY THYROID STIM HORMONE: CPT

## 2022-03-21 PROCEDURE — 36415 COLL VENOUS BLD VENIPUNCTURE: CPT

## 2022-03-21 PROCEDURE — 3008F BODY MASS INDEX DOCD: CPT | Performed by: INTERNAL MEDICINE

## 2022-03-21 PROCEDURE — 3078F DIAST BP <80 MM HG: CPT | Performed by: INTERNAL MEDICINE

## 2022-03-21 PROCEDURE — 83521 IG LIGHT CHAINS FREE EACH: CPT

## 2022-03-21 PROCEDURE — 86334 IMMUNOFIX E-PHORESIS SERUM: CPT

## 2022-03-21 PROCEDURE — 82784 ASSAY IGA/IGD/IGG/IGM EACH: CPT

## 2022-03-21 RX ORDER — OXYBUTYNIN CHLORIDE 15 MG/1
15 TABLET, EXTENDED RELEASE ORAL DAILY
COMMUNITY
Start: 2022-02-14

## 2022-03-22 ENCOUNTER — TELEPHONE (OUTPATIENT)
Dept: INTERNAL MEDICINE CLINIC | Facility: CLINIC | Age: 77
End: 2022-03-22

## 2022-03-22 LAB
KAPPA LC FREE SER-MCNC: 2.76 MG/DL (ref 0.33–1.94)
KAPPA LC FREE/LAMBDA FREE SER NEPH: 1.92 {RATIO} (ref 0.26–1.65)
LAMBDA LC FREE SERPL-MCNC: 1.43 MG/DL (ref 0.57–2.63)

## 2022-03-22 NOTE — TELEPHONE ENCOUNTER
Santiago Dhillon. I did Baron follow-up immunoglobin's. He last had them done August 6, 2021. He remains with a slightly elevated kappa lambda ratio but no monoclonal proteins. I am thinking I just should follow-up on these every 6 months. Please let me know your thoughts. Thank you for having seen Jose Alejandro Larkin August 2021.

## 2022-03-24 NOTE — TELEPHONE ENCOUNTER
Please let patient know that his labs came out fairly acceptable. Please let him know that I did communicate with Dr. Sun Alejandre and she had the following recommendations regarding his immunoglobin's. 1.  She requested a urine sample that checks for proteins. I left order. He does not have to fast.  He can have these done within the next 30 days or so. This will help complete his testing for his elevated proteins. 2.  Otherwise I was very happy with his results.

## 2022-03-31 ENCOUNTER — LAB ENCOUNTER (OUTPATIENT)
Dept: LAB | Age: 77
End: 2022-03-31
Attending: INTERNAL MEDICINE
Payer: MEDICARE

## 2022-03-31 DIAGNOSIS — D47.2 MGUS (MONOCLONAL GAMMOPATHY OF UNKNOWN SIGNIFICANCE): ICD-10-CM

## 2022-03-31 DIAGNOSIS — R76.8 ELEVATED SERUM IMMUNOGLOBULIN FREE LIGHT CHAINS: ICD-10-CM

## 2022-03-31 LAB
BASOPHILS # BLD AUTO: 0.04 X10(3) UL (ref 0–0.2)
BASOPHILS NFR BLD AUTO: 0.8 %
DEPRECATED RDW RBC AUTO: 45.8 FL (ref 35.1–46.3)
EOSINOPHIL # BLD AUTO: 0.24 X10(3) UL (ref 0–0.7)
EOSINOPHIL NFR BLD AUTO: 4.6 %
ERYTHROCYTE [DISTWIDTH] IN BLOOD BY AUTOMATED COUNT: 13.7 % (ref 11–15)
HCT VFR BLD AUTO: 41.6 %
HGB BLD-MCNC: 13.4 G/DL
IMM GRANULOCYTES # BLD AUTO: 0.01 X10(3) UL (ref 0–1)
IMM GRANULOCYTES NFR BLD: 0.2 %
LYMPHOCYTES # BLD AUTO: 1.13 X10(3) UL (ref 1–4)
LYMPHOCYTES NFR BLD AUTO: 21.7 %
MCH RBC QN AUTO: 29.1 PG (ref 26–34)
MCHC RBC AUTO-ENTMCNC: 32.2 G/DL (ref 31–37)
MCV RBC AUTO: 90.4 FL
MONOCYTES # BLD AUTO: 0.62 X10(3) UL (ref 0.1–1)
MONOCYTES NFR BLD AUTO: 11.9 %
NEUTROPHILS # BLD AUTO: 3.17 X10 (3) UL (ref 1.5–7.7)
NEUTROPHILS # BLD AUTO: 3.17 X10(3) UL (ref 1.5–7.7)
NEUTROPHILS NFR BLD AUTO: 60.8 %
PLATELET # BLD AUTO: 172 10(3)UL (ref 150–450)
PROT UR-MCNC: 8.2 MG/DL
RBC # BLD AUTO: 4.6 X10(6)UL
WBC # BLD AUTO: 5.2 X10(3) UL (ref 4–11)

## 2022-03-31 PROCEDURE — 86335 IMMUNFIX E-PHORSIS/URINE/CSF: CPT

## 2022-03-31 PROCEDURE — 85025 COMPLETE CBC W/AUTO DIFF WBC: CPT

## 2022-03-31 PROCEDURE — 36415 COLL VENOUS BLD VENIPUNCTURE: CPT

## 2022-03-31 PROCEDURE — 84166 PROTEIN E-PHORESIS/URINE/CSF: CPT

## 2022-04-07 ENCOUNTER — TELEPHONE (OUTPATIENT)
Dept: INTERNAL MEDICINE CLINIC | Facility: CLINIC | Age: 77
End: 2022-04-07

## 2022-04-07 NOTE — TELEPHONE ENCOUNTER
Please notify patient that his urine test came out good. This was a follow-up that Dr. Negro Tai requested. No changes in his treatment are needed for now.

## 2022-06-14 ENCOUNTER — HOSPITAL ENCOUNTER (OUTPATIENT)
Dept: CT IMAGING | Facility: HOSPITAL | Age: 77
Discharge: HOME | End: 2022-06-14
Attending: RADIOLOGY
Payer: MEDICARE

## 2022-06-14 DIAGNOSIS — I71.4 AAA (ABDOMINAL AORTIC ANEURYSM) (HCC): ICD-10-CM

## 2022-06-14 LAB — CREAT BLD-MCNC: 1 MG/DL

## 2022-06-14 PROCEDURE — 74174 CTA ABD&PLVS W/CONTRAST: CPT | Performed by: RADIOLOGY

## 2022-06-14 PROCEDURE — 82565 ASSAY OF CREATININE: CPT

## 2022-08-19 ENCOUNTER — OFFICE VISIT (OUTPATIENT)
Dept: UROLOGY | Age: 77
End: 2022-08-19

## 2022-08-19 VITALS
SYSTOLIC BLOOD PRESSURE: 140 MMHG | BODY MASS INDEX: 24.71 KG/M2 | OXYGEN SATURATION: 99 % | HEIGHT: 68 IN | RESPIRATION RATE: 18 BRPM | WEIGHT: 163 LBS | DIASTOLIC BLOOD PRESSURE: 76 MMHG | HEART RATE: 58 BPM

## 2022-08-19 DIAGNOSIS — N32.81 OVERACTIVE BLADDER: Primary | ICD-10-CM

## 2022-08-19 LAB — BLDR SCAN MLS: NORMAL

## 2022-08-19 PROCEDURE — 99214 OFFICE O/P EST MOD 30 MIN: CPT | Performed by: UROLOGY

## 2022-08-19 PROCEDURE — 51798 US URINE CAPACITY MEASURE: CPT | Performed by: UROLOGY

## 2022-08-19 RX ORDER — OXYBUTYNIN CHLORIDE 15 MG/1
15 TABLET, EXTENDED RELEASE ORAL DAILY
Qty: 90 TABLET | Refills: 3 | Status: SHIPPED | OUTPATIENT
Start: 2022-08-19 | End: 2023-08-21 | Stop reason: ALTCHOICE

## 2022-09-16 ENCOUNTER — TELEPHONE (OUTPATIENT)
Dept: INTERNAL MEDICINE CLINIC | Facility: CLINIC | Age: 77
End: 2022-09-16

## 2022-09-16 NOTE — TELEPHONE ENCOUNTER
Santiago Mccarthy. I will be seeing Rafaela Gilbert next week. I forgot if I should be ordering another monoclonal protein study. As you know he had elevated kappa lambda ratio back August 2021. Thank you. Jennifer García.

## 2022-09-19 ENCOUNTER — LAB ENCOUNTER (OUTPATIENT)
Dept: LAB | Age: 77
End: 2022-09-19
Attending: INTERNAL MEDICINE

## 2022-09-19 ENCOUNTER — OFFICE VISIT (OUTPATIENT)
Dept: INTERNAL MEDICINE CLINIC | Facility: CLINIC | Age: 77
End: 2022-09-19
Payer: MEDICARE

## 2022-09-19 VITALS
TEMPERATURE: 99 F | DIASTOLIC BLOOD PRESSURE: 68 MMHG | HEART RATE: 66 BPM | HEIGHT: 68 IN | BODY MASS INDEX: 23.61 KG/M2 | WEIGHT: 155.81 LBS | OXYGEN SATURATION: 98 % | SYSTOLIC BLOOD PRESSURE: 116 MMHG

## 2022-09-19 DIAGNOSIS — I25.10 CORONARY ARTERY DISEASE INVOLVING NATIVE CORONARY ARTERY OF NATIVE HEART WITHOUT ANGINA PECTORIS: ICD-10-CM

## 2022-09-19 DIAGNOSIS — R25.1 TREMOR: ICD-10-CM

## 2022-09-19 DIAGNOSIS — Z00.00 ROUTINE HEALTH MAINTENANCE: ICD-10-CM

## 2022-09-19 DIAGNOSIS — Z85.810 HISTORY OF TONGUE CANCER: ICD-10-CM

## 2022-09-19 DIAGNOSIS — R76.8 ELEVATED SERUM IMMUNOGLOBULIN FREE LIGHT CHAINS: ICD-10-CM

## 2022-09-19 DIAGNOSIS — G62.9 NEUROPATHY: ICD-10-CM

## 2022-09-19 DIAGNOSIS — E78.5 HYPERLIPIDEMIA, UNSPECIFIED HYPERLIPIDEMIA TYPE: ICD-10-CM

## 2022-09-19 DIAGNOSIS — Z86.010 HISTORY OF ADENOMATOUS POLYP OF COLON: ICD-10-CM

## 2022-09-19 DIAGNOSIS — Z00.00 MEDICARE ANNUAL WELLNESS VISIT, SUBSEQUENT: Primary | ICD-10-CM

## 2022-09-19 DIAGNOSIS — Z96.89 S/P INSERTION OF SPINAL CORD STIMULATOR: ICD-10-CM

## 2022-09-19 DIAGNOSIS — I71.4 ABDOMINAL AORTIC ANEURYSM (AAA) WITHOUT RUPTURE (HCC): ICD-10-CM

## 2022-09-19 LAB
ALBUMIN SERPL-MCNC: 3.8 G/DL (ref 3.4–5)
ALBUMIN SERPL-MCNC: 3.8 G/DL (ref 3.4–5)
ALP LIVER SERPL-CCNC: 115 U/L
ALT SERPL-CCNC: 25 U/L
ANION GAP SERPL CALC-SCNC: 7 MMOL/L (ref 0–18)
AST SERPL-CCNC: 19 U/L (ref 15–37)
BASOPHILS # BLD AUTO: 0.03 X10(3) UL (ref 0–0.2)
BASOPHILS NFR BLD AUTO: 0.6 %
BILIRUB DIRECT SERPL-MCNC: 0.2 MG/DL (ref 0–0.2)
BILIRUB SERPL-MCNC: 1 MG/DL (ref 0.1–2)
BILIRUB UR QL: NEGATIVE
BUN BLD-MCNC: 25 MG/DL (ref 7–18)
BUN/CREAT SERPL: 20.8 (ref 10–20)
CALCIUM BLD-MCNC: 9.4 MG/DL (ref 8.5–10.1)
CHLORIDE SERPL-SCNC: 107 MMOL/L (ref 98–112)
CHOLEST SERPL-MCNC: 119 MG/DL (ref ?–200)
CLARITY UR: CLEAR
CO2 SERPL-SCNC: 28 MMOL/L (ref 21–32)
COLOR UR: YELLOW
CREAT BLD-MCNC: 1.2 MG/DL
DEPRECATED RDW RBC AUTO: 46.3 FL (ref 35.1–46.3)
EOSINOPHIL # BLD AUTO: 0.17 X10(3) UL (ref 0–0.7)
EOSINOPHIL NFR BLD AUTO: 3.3 %
ERYTHROCYTE [DISTWIDTH] IN BLOOD BY AUTOMATED COUNT: 13.6 % (ref 11–15)
FASTING PATIENT LIPID ANSWER: YES
GFR SERPLBLD BASED ON 1.73 SQ M-ARVRAT: 62 ML/MIN/1.73M2 (ref 60–?)
GLUCOSE BLD-MCNC: 97 MG/DL (ref 70–99)
GLUCOSE UR-MCNC: NEGATIVE MG/DL
HCT VFR BLD AUTO: 43.8 %
HDLC SERPL-MCNC: 54 MG/DL (ref 40–59)
HGB BLD-MCNC: 14 G/DL
HGB UR QL STRIP.AUTO: NEGATIVE
IMM GRANULOCYTES # BLD AUTO: 0.02 X10(3) UL (ref 0–1)
IMM GRANULOCYTES NFR BLD: 0.4 %
KETONES UR-MCNC: NEGATIVE MG/DL
LDLC SERPL CALC-MCNC: 50 MG/DL (ref ?–100)
LYMPHOCYTES # BLD AUTO: 0.8 X10(3) UL (ref 1–4)
LYMPHOCYTES NFR BLD AUTO: 15.7 %
MCH RBC QN AUTO: 29.3 PG (ref 26–34)
MCHC RBC AUTO-ENTMCNC: 32 G/DL (ref 31–37)
MCV RBC AUTO: 91.6 FL
MONOCYTES # BLD AUTO: 0.55 X10(3) UL (ref 0.1–1)
MONOCYTES NFR BLD AUTO: 10.8 %
NEUTROPHILS # BLD AUTO: 3.51 X10 (3) UL (ref 1.5–7.7)
NEUTROPHILS # BLD AUTO: 3.51 X10(3) UL (ref 1.5–7.7)
NEUTROPHILS NFR BLD AUTO: 69.2 %
NITRITE UR QL STRIP.AUTO: NEGATIVE
NONHDLC SERPL-MCNC: 65 MG/DL (ref ?–130)
OSMOLALITY SERPL CALC.SUM OF ELEC: 298 MOSM/KG (ref 275–295)
PH UR: 6 [PH] (ref 5–8)
PHOSPHATE SERPL-MCNC: 3 MG/DL (ref 2.5–4.9)
PLATELET # BLD AUTO: 187 10(3)UL (ref 150–450)
POTASSIUM SERPL-SCNC: 4.4 MMOL/L (ref 3.5–5.1)
PROT SERPL-MCNC: 7.4 G/DL (ref 6.4–8.2)
PROT UR-MCNC: NEGATIVE MG/DL
RBC # BLD AUTO: 4.78 X10(6)UL
SODIUM SERPL-SCNC: 142 MMOL/L (ref 136–145)
SP GR UR STRIP: 1.01 (ref 1–1.03)
TRIGL SERPL-MCNC: 75 MG/DL (ref 30–149)
UROBILINOGEN UR STRIP-ACNC: 0.2
VLDLC SERPL CALC-MCNC: 11 MG/DL (ref 0–30)
WBC # BLD AUTO: 5.1 X10(3) UL (ref 4–11)

## 2022-09-19 PROCEDURE — 80061 LIPID PANEL: CPT

## 2022-09-19 PROCEDURE — 36415 COLL VENOUS BLD VENIPUNCTURE: CPT

## 2022-09-19 PROCEDURE — 87086 URINE CULTURE/COLONY COUNT: CPT | Performed by: INTERNAL MEDICINE

## 2022-09-19 PROCEDURE — 83521 IG LIGHT CHAINS FREE EACH: CPT

## 2022-09-19 PROCEDURE — 84165 PROTEIN E-PHORESIS SERUM: CPT

## 2022-09-19 PROCEDURE — 81015 MICROSCOPIC EXAM OF URINE: CPT | Performed by: INTERNAL MEDICINE

## 2022-09-19 PROCEDURE — 81001 URINALYSIS AUTO W/SCOPE: CPT | Performed by: INTERNAL MEDICINE

## 2022-09-19 PROCEDURE — 86334 IMMUNOFIX E-PHORESIS SERUM: CPT

## 2022-09-19 PROCEDURE — 87077 CULTURE AEROBIC IDENTIFY: CPT | Performed by: INTERNAL MEDICINE

## 2022-09-19 PROCEDURE — 80076 HEPATIC FUNCTION PANEL: CPT

## 2022-09-19 PROCEDURE — 80069 RENAL FUNCTION PANEL: CPT

## 2022-09-19 PROCEDURE — 85025 COMPLETE CBC W/AUTO DIFF WBC: CPT

## 2022-09-20 ENCOUNTER — TELEPHONE (OUTPATIENT)
Dept: INTERNAL MEDICINE CLINIC | Facility: CLINIC | Age: 77
End: 2022-09-20

## 2022-09-20 NOTE — TELEPHONE ENCOUNTER
Please let patient know that his labs from yesterday look acceptable. To labs that are still being worked on will be back soon. We will call her with those results. In addition I did speak to  yesterday regarding his CT scan that he had in June for his aortic aneurysm. Dr. Shaneka Whitley recommended repeat CT scan in 2 years.

## 2022-09-21 NOTE — TELEPHONE ENCOUNTER
Please let patient know that we did find a bacteria in his urine. I did pend nitrofurantoin for him to take. This should clear up. I do not believe he has had any unusual urinary symptoms. He does see his urologist Fareed Matthew. Please see if we can  find his fax number and fax over the patient's urinalysis and urine culture results with a note that I am treating him with nitrofurantoin for 5 days. Thank you.

## 2022-09-21 NOTE — TELEPHONE ENCOUNTER
Urinalysis shows no white cells--therefore no UTI present  Urine culture is showing some Aerococcus, but urinalysis had epithelial cells and therefore this is most likely contaminant organism--no treatment is needed

## 2022-09-22 LAB
ALBUMIN SERPL ELPH-MCNC: 3.99 G/DL (ref 3.75–5.21)
ALBUMIN/GLOB SERPL: 1.33 {RATIO} (ref 1–2)
ALPHA1 GLOB SERPL ELPH-MCNC: 0.27 G/DL (ref 0.19–0.46)
ALPHA2 GLOB SERPL ELPH-MCNC: 0.77 G/DL (ref 0.48–1.05)
B-GLOBULIN SERPL ELPH-MCNC: 0.95 G/DL (ref 0.68–1.23)
GAMMA GLOB SERPL ELPH-MCNC: 1.02 G/DL (ref 0.62–1.7)
KAPPA LC FREE SER-MCNC: 3.27 MG/DL (ref 0.33–1.94)
KAPPA LC FREE/LAMBDA FREE SER NEPH: 2.05 {RATIO} (ref 0.26–1.65)
LAMBDA LC FREE SERPL-MCNC: 1.59 MG/DL (ref 0.57–2.63)
PROT SERPL-MCNC: 7 G/DL (ref 6.4–8.2)

## 2022-09-22 RX ORDER — NITROFURANTOIN 25; 75 MG/1; MG/1
100 CAPSULE ORAL 2 TIMES DAILY
Qty: 10 CAPSULE | Refills: 0 | Status: SHIPPED | OUTPATIENT
Start: 2022-09-22

## 2022-09-22 NOTE — TELEPHONE ENCOUNTER
Called and spoke with patient. Relayed MD's message. Patient chose [facility]. Prescription sent to [place]. U/A & culture results faxed to Dr. Henry Santana at [phone] along with MD's message. Confirmation received.

## 2022-09-23 ENCOUNTER — TELEPHONE (OUTPATIENT)
Dept: INTERNAL MEDICINE CLINIC | Facility: CLINIC | Age: 77
End: 2022-09-23

## 2022-09-23 NOTE — TELEPHONE ENCOUNTER
Lab of 9/19/2022 was already addressed in 9/20/2022 telephone template. The monoclonal protein study results released yesterday shows kappa free light chain 3273 and kappa/lambda FLC ratio 2.05.   This can wait for Dr. Paulie Shetty to him

## 2022-09-25 NOTE — TELEPHONE ENCOUNTER
Hi Amaryllis. Baron's light chain ratio minimally elevated. No monoclonal proteins. Past Bence Walker negative. Should I continue to follow his free light chains? Thank you.  Melanie Sanchez

## 2023-03-16 ENCOUNTER — LAB ENCOUNTER (OUTPATIENT)
Dept: LAB | Age: 78
End: 2023-03-16
Attending: INTERNAL MEDICINE
Payer: MEDICARE

## 2023-03-16 ENCOUNTER — TELEPHONE (OUTPATIENT)
Dept: INTERNAL MEDICINE CLINIC | Facility: CLINIC | Age: 78
End: 2023-03-16

## 2023-03-16 DIAGNOSIS — Z12.5 PROSTATE CANCER SCREENING: ICD-10-CM

## 2023-03-16 DIAGNOSIS — E78.5 HYPERLIPIDEMIA, UNSPECIFIED HYPERLIPIDEMIA TYPE: Primary | ICD-10-CM

## 2023-03-16 DIAGNOSIS — Z12.5 PROSTATE CANCER SCREENING: Primary | ICD-10-CM

## 2023-03-16 DIAGNOSIS — R63.4 WEIGHT LOSS: ICD-10-CM

## 2023-03-16 DIAGNOSIS — D47.2 MGUS (MONOCLONAL GAMMOPATHY OF UNKNOWN SIGNIFICANCE): ICD-10-CM

## 2023-03-16 LAB
ALBUMIN SERPL-MCNC: 3.7 G/DL (ref 3.4–5)
ALBUMIN/GLOB SERPL: 1.1 {RATIO} (ref 1–2)
ALP LIVER SERPL-CCNC: 117 U/L
ALT SERPL-CCNC: 23 U/L
ANION GAP SERPL CALC-SCNC: 7 MMOL/L (ref 0–18)
AST SERPL-CCNC: 22 U/L (ref 15–37)
BASOPHILS # BLD AUTO: 0.03 X10(3) UL (ref 0–0.2)
BASOPHILS NFR BLD AUTO: 0.6 %
BILIRUB SERPL-MCNC: 0.8 MG/DL (ref 0.1–2)
BILIRUB UR QL: NEGATIVE
BUN BLD-MCNC: 26 MG/DL (ref 7–18)
BUN/CREAT SERPL: 22.6 (ref 10–20)
CALCIUM BLD-MCNC: 9.6 MG/DL (ref 8.5–10.1)
CHLORIDE SERPL-SCNC: 107 MMOL/L (ref 98–112)
CHOLEST SERPL-MCNC: 113 MG/DL (ref ?–200)
CLARITY UR: CLEAR
CO2 SERPL-SCNC: 28 MMOL/L (ref 21–32)
COLOR UR: YELLOW
COMPLEXED PSA SERPL-MCNC: 1.94 NG/ML (ref ?–4)
CREAT BLD-MCNC: 1.15 MG/DL
DEPRECATED RDW RBC AUTO: 44 FL (ref 35.1–46.3)
EOSINOPHIL # BLD AUTO: 0.16 X10(3) UL (ref 0–0.7)
EOSINOPHIL NFR BLD AUTO: 3.3 %
ERYTHROCYTE [DISTWIDTH] IN BLOOD BY AUTOMATED COUNT: 13.4 % (ref 11–15)
FASTING PATIENT LIPID ANSWER: YES
FASTING STATUS PATIENT QL REPORTED: YES
GFR SERPLBLD BASED ON 1.73 SQ M-ARVRAT: 65 ML/MIN/1.73M2 (ref 60–?)
GLOBULIN PLAS-MCNC: 3.5 G/DL (ref 2.8–4.4)
GLUCOSE BLD-MCNC: 96 MG/DL (ref 70–99)
GLUCOSE UR-MCNC: NEGATIVE MG/DL
HCT VFR BLD AUTO: 42.7 %
HDLC SERPL-MCNC: 51 MG/DL (ref 40–59)
HGB BLD-MCNC: 14 G/DL
HGB UR QL STRIP.AUTO: NEGATIVE
IMM GRANULOCYTES # BLD AUTO: 0.01 X10(3) UL (ref 0–1)
IMM GRANULOCYTES NFR BLD: 0.2 %
KETONES UR-MCNC: NEGATIVE MG/DL
LDLC SERPL CALC-MCNC: 48 MG/DL (ref ?–100)
LYMPHOCYTES # BLD AUTO: 1.03 X10(3) UL (ref 1–4)
LYMPHOCYTES NFR BLD AUTO: 21.2 %
MCH RBC QN AUTO: 29.1 PG (ref 26–34)
MCHC RBC AUTO-ENTMCNC: 32.8 G/DL (ref 31–37)
MCV RBC AUTO: 88.8 FL
MONOCYTES # BLD AUTO: 0.6 X10(3) UL (ref 0.1–1)
MONOCYTES NFR BLD AUTO: 12.4 %
NEUTROPHILS # BLD AUTO: 3.02 X10 (3) UL (ref 1.5–7.7)
NEUTROPHILS # BLD AUTO: 3.02 X10(3) UL (ref 1.5–7.7)
NEUTROPHILS NFR BLD AUTO: 62.3 %
NITRITE UR QL STRIP.AUTO: NEGATIVE
NONHDLC SERPL-MCNC: 62 MG/DL (ref ?–130)
OSMOLALITY SERPL CALC.SUM OF ELEC: 299 MOSM/KG (ref 275–295)
PH UR: 7 [PH] (ref 5–8)
PLATELET # BLD AUTO: 174 10(3)UL (ref 150–450)
POTASSIUM SERPL-SCNC: 4.3 MMOL/L (ref 3.5–5.1)
PROT SERPL-MCNC: 7.2 G/DL (ref 6.4–8.2)
PROT UR-MCNC: NEGATIVE MG/DL
RBC # BLD AUTO: 4.81 X10(6)UL
SODIUM SERPL-SCNC: 142 MMOL/L (ref 136–145)
SP GR UR STRIP: 1.01 (ref 1–1.03)
TRIGL SERPL-MCNC: 65 MG/DL (ref 30–149)
TSI SER-ACNC: 2.35 MIU/ML (ref 0.36–3.74)
UROBILINOGEN UR STRIP-ACNC: <2
VIT C UR-MCNC: NEGATIVE MG/DL
VLDLC SERPL CALC-MCNC: 9 MG/DL (ref 0–30)
WBC # BLD AUTO: 4.9 X10(3) UL (ref 4–11)

## 2023-03-16 PROCEDURE — 81001 URINALYSIS AUTO W/SCOPE: CPT | Performed by: INTERNAL MEDICINE

## 2023-03-16 PROCEDURE — 85025 COMPLETE CBC W/AUTO DIFF WBC: CPT | Performed by: INTERNAL MEDICINE

## 2023-03-16 PROCEDURE — 84443 ASSAY THYROID STIM HORMONE: CPT | Performed by: INTERNAL MEDICINE

## 2023-03-16 PROCEDURE — 80053 COMPREHEN METABOLIC PANEL: CPT | Performed by: INTERNAL MEDICINE

## 2023-03-16 PROCEDURE — 87086 URINE CULTURE/COLONY COUNT: CPT | Performed by: INTERNAL MEDICINE

## 2023-03-16 PROCEDURE — 80061 LIPID PANEL: CPT | Performed by: INTERNAL MEDICINE

## 2023-03-16 PROCEDURE — 36415 COLL VENOUS BLD VENIPUNCTURE: CPT | Performed by: INTERNAL MEDICINE

## 2023-03-16 NOTE — TELEPHONE ENCOUNTER
Per Johanna Harrington, patient at  requesting labs for his physical. He drove all the way from [place]. Reviewed patient's chart and placed labs per Dr. Lianna Rene protocol. TO Dr. Charles Terry-- Please advise if anything further needs to be added on. I reviewed your discussion with Dr. Tyra Henning and did not interpret that any of the labs done for light chains or bence roberts protein needed to be re-ordered.

## 2023-03-20 ENCOUNTER — OFFICE VISIT (OUTPATIENT)
Dept: INTERNAL MEDICINE CLINIC | Facility: CLINIC | Age: 78
End: 2023-03-20

## 2023-03-20 VITALS
TEMPERATURE: 98 F | WEIGHT: 156.81 LBS | SYSTOLIC BLOOD PRESSURE: 100 MMHG | BODY MASS INDEX: 24.9 KG/M2 | HEIGHT: 66.7 IN | HEART RATE: 62 BPM | OXYGEN SATURATION: 97 % | DIASTOLIC BLOOD PRESSURE: 64 MMHG

## 2023-03-20 DIAGNOSIS — Z85.810 HISTORY OF TONGUE CANCER: ICD-10-CM

## 2023-03-20 DIAGNOSIS — J92.9 PLEURAL PLAQUE: ICD-10-CM

## 2023-03-20 DIAGNOSIS — I71.40 ABDOMINAL AORTIC ANEURYSM (AAA) WITHOUT RUPTURE, UNSPECIFIED PART (HCC): ICD-10-CM

## 2023-03-20 DIAGNOSIS — Z00.00 ROUTINE HEALTH MAINTENANCE: ICD-10-CM

## 2023-03-20 DIAGNOSIS — Z86.010 HISTORY OF ADENOMATOUS POLYP OF COLON: ICD-10-CM

## 2023-03-20 DIAGNOSIS — R76.8 ELEVATED SERUM IMMUNOGLOBULIN FREE LIGHT CHAINS: ICD-10-CM

## 2023-03-20 DIAGNOSIS — Z96.89 S/P INSERTION OF SPINAL CORD STIMULATOR: ICD-10-CM

## 2023-03-20 DIAGNOSIS — E78.5 HYPERLIPIDEMIA, UNSPECIFIED HYPERLIPIDEMIA TYPE: ICD-10-CM

## 2023-03-20 DIAGNOSIS — Z00.00 MEDICARE ANNUAL WELLNESS VISIT, SUBSEQUENT: Primary | ICD-10-CM

## 2023-03-20 DIAGNOSIS — D47.2 MGUS (MONOCLONAL GAMMOPATHY OF UNKNOWN SIGNIFICANCE): ICD-10-CM

## 2023-08-21 ENCOUNTER — OFFICE VISIT (OUTPATIENT)
Dept: UROLOGY | Age: 78
End: 2023-08-21

## 2023-08-21 VITALS
SYSTOLIC BLOOD PRESSURE: 111 MMHG | DIASTOLIC BLOOD PRESSURE: 67 MMHG | HEART RATE: 64 BPM | WEIGHT: 155.7 LBS | BODY MASS INDEX: 23.6 KG/M2 | TEMPERATURE: 98.2 F | OXYGEN SATURATION: 100 % | HEIGHT: 68 IN

## 2023-08-21 DIAGNOSIS — N32.81 OVERACTIVE BLADDER: Primary | ICD-10-CM

## 2023-08-21 DIAGNOSIS — Z87.438 HISTORY OF BPH: ICD-10-CM

## 2023-08-21 LAB — BLDR SCAN MLS: NORMAL

## 2023-08-21 PROCEDURE — 51798 US URINE CAPACITY MEASURE: CPT | Performed by: UROLOGY

## 2023-08-21 PROCEDURE — 99214 OFFICE O/P EST MOD 30 MIN: CPT | Performed by: UROLOGY

## 2023-08-21 RX ORDER — GABAPENTIN 300 MG/1
CAPSULE ORAL
COMMUNITY

## 2023-09-20 ENCOUNTER — OFFICE VISIT (OUTPATIENT)
Dept: UROLOGY | Age: 78
End: 2023-09-20

## 2023-09-20 VITALS — HEART RATE: 63 BPM | SYSTOLIC BLOOD PRESSURE: 128 MMHG | DIASTOLIC BLOOD PRESSURE: 74 MMHG | RESPIRATION RATE: 16 BRPM

## 2023-09-20 DIAGNOSIS — N32.81 OVERACTIVE BLADDER: Primary | ICD-10-CM

## 2023-09-20 DIAGNOSIS — Z87.438 HISTORY OF BPH: ICD-10-CM

## 2023-09-20 PROCEDURE — 99213 OFFICE O/P EST LOW 20 MIN: CPT | Performed by: UROLOGY

## 2023-09-21 ENCOUNTER — OFFICE VISIT (OUTPATIENT)
Dept: INTERNAL MEDICINE CLINIC | Facility: CLINIC | Age: 78
End: 2023-09-21

## 2023-09-21 ENCOUNTER — LAB ENCOUNTER (OUTPATIENT)
Dept: LAB | Age: 78
End: 2023-09-21
Attending: INTERNAL MEDICINE
Payer: MEDICARE

## 2023-09-21 VITALS
WEIGHT: 153 LBS | SYSTOLIC BLOOD PRESSURE: 112 MMHG | RESPIRATION RATE: 20 BRPM | HEART RATE: 71 BPM | DIASTOLIC BLOOD PRESSURE: 70 MMHG | BODY MASS INDEX: 24.3 KG/M2 | HEIGHT: 66.7 IN | TEMPERATURE: 98 F | OXYGEN SATURATION: 95 %

## 2023-09-21 DIAGNOSIS — Z85.810 HISTORY OF TONGUE CANCER: ICD-10-CM

## 2023-09-21 DIAGNOSIS — R76.8 ELEVATED SERUM IMMUNOGLOBULIN FREE LIGHT CHAINS: ICD-10-CM

## 2023-09-21 DIAGNOSIS — G45.9 TIA (TRANSIENT ISCHEMIC ATTACK): ICD-10-CM

## 2023-09-21 DIAGNOSIS — D47.2 MGUS (MONOCLONAL GAMMOPATHY OF UNKNOWN SIGNIFICANCE): ICD-10-CM

## 2023-09-21 DIAGNOSIS — E78.5 HYPERLIPIDEMIA, UNSPECIFIED HYPERLIPIDEMIA TYPE: ICD-10-CM

## 2023-09-21 DIAGNOSIS — E78.5 HYPERLIPIDEMIA, UNSPECIFIED HYPERLIPIDEMIA TYPE: Primary | ICD-10-CM

## 2023-09-21 DIAGNOSIS — Z96.89 S/P INSERTION OF SPINAL CORD STIMULATOR: ICD-10-CM

## 2023-09-21 DIAGNOSIS — Z86.010 HISTORY OF ADENOMATOUS POLYP OF COLON: ICD-10-CM

## 2023-09-21 DIAGNOSIS — I71.40 ABDOMINAL AORTIC ANEURYSM (AAA) WITHOUT RUPTURE, UNSPECIFIED PART (HCC): ICD-10-CM

## 2023-09-21 DIAGNOSIS — Z00.00 ROUTINE HEALTH MAINTENANCE: ICD-10-CM

## 2023-09-21 DIAGNOSIS — I65.29 STENOSIS OF CAROTID ARTERY, UNSPECIFIED LATERALITY: ICD-10-CM

## 2023-09-21 LAB
ALBUMIN SERPL-MCNC: 3.5 G/DL (ref 3.4–5)
ALBUMIN/GLOB SERPL: 1 {RATIO} (ref 1–2)
ALP LIVER SERPL-CCNC: 105 U/L
ALT SERPL-CCNC: 30 U/L
ANION GAP SERPL CALC-SCNC: 3 MMOL/L (ref 0–18)
AST SERPL-CCNC: 21 U/L (ref 15–37)
BASOPHILS # BLD AUTO: 0.02 X10(3) UL (ref 0–0.2)
BASOPHILS NFR BLD AUTO: 0.4 %
BILIRUB SERPL-MCNC: 0.8 MG/DL (ref 0.1–2)
BUN BLD-MCNC: 23 MG/DL (ref 7–18)
BUN/CREAT SERPL: 21.3 (ref 10–20)
CALCIUM BLD-MCNC: 9.3 MG/DL (ref 8.5–10.1)
CHLORIDE SERPL-SCNC: 109 MMOL/L (ref 98–112)
CHOLEST SERPL-MCNC: 116 MG/DL (ref ?–200)
CO2 SERPL-SCNC: 31 MMOL/L (ref 21–32)
CREAT BLD-MCNC: 1.08 MG/DL
DEPRECATED RDW RBC AUTO: 46.5 FL (ref 35.1–46.3)
EGFRCR SERPLBLD CKD-EPI 2021: 70 ML/MIN/1.73M2 (ref 60–?)
EOSINOPHIL # BLD AUTO: 0.08 X10(3) UL (ref 0–0.7)
EOSINOPHIL NFR BLD AUTO: 1.6 %
ERYTHROCYTE [DISTWIDTH] IN BLOOD BY AUTOMATED COUNT: 13.8 % (ref 11–15)
FASTING PATIENT LIPID ANSWER: NO
FASTING STATUS PATIENT QL REPORTED: NO
GLOBULIN PLAS-MCNC: 3.4 G/DL (ref 2.8–4.4)
GLUCOSE BLD-MCNC: 114 MG/DL (ref 70–99)
HCT VFR BLD AUTO: 42.6 %
HDLC SERPL-MCNC: 52 MG/DL (ref 40–59)
HGB BLD-MCNC: 13.7 G/DL
IMM GRANULOCYTES # BLD AUTO: 0.01 X10(3) UL (ref 0–1)
IMM GRANULOCYTES NFR BLD: 0.2 %
LDLC SERPL CALC-MCNC: 50 MG/DL (ref ?–100)
LYMPHOCYTES # BLD AUTO: 0.92 X10(3) UL (ref 1–4)
LYMPHOCYTES NFR BLD AUTO: 18.2 %
MCH RBC QN AUTO: 29.3 PG (ref 26–34)
MCHC RBC AUTO-ENTMCNC: 32.2 G/DL (ref 31–37)
MCV RBC AUTO: 91.2 FL
MONOCYTES # BLD AUTO: 0.62 X10(3) UL (ref 0.1–1)
MONOCYTES NFR BLD AUTO: 12.3 %
NEUTROPHILS # BLD AUTO: 3.4 X10 (3) UL (ref 1.5–7.7)
NEUTROPHILS # BLD AUTO: 3.4 X10(3) UL (ref 1.5–7.7)
NEUTROPHILS NFR BLD AUTO: 67.3 %
NONHDLC SERPL-MCNC: 64 MG/DL (ref ?–130)
OSMOLALITY SERPL CALC.SUM OF ELEC: 301 MOSM/KG (ref 275–295)
PLATELET # BLD AUTO: 181 10(3)UL (ref 150–450)
POTASSIUM SERPL-SCNC: 4.5 MMOL/L (ref 3.5–5.1)
PROT SERPL-MCNC: 6.9 G/DL (ref 6.4–8.2)
RBC # BLD AUTO: 4.67 X10(6)UL
SODIUM SERPL-SCNC: 143 MMOL/L (ref 136–145)
TRIGL SERPL-MCNC: 69 MG/DL (ref 30–149)
TSI SER-ACNC: 1.66 MIU/ML (ref 0.36–3.74)
VLDLC SERPL CALC-MCNC: 10 MG/DL (ref 0–30)
WBC # BLD AUTO: 5.1 X10(3) UL (ref 4–11)

## 2023-09-21 PROCEDURE — 85025 COMPLETE CBC W/AUTO DIFF WBC: CPT

## 2023-09-21 PROCEDURE — 84443 ASSAY THYROID STIM HORMONE: CPT

## 2023-09-21 PROCEDURE — 80061 LIPID PANEL: CPT

## 2023-09-21 PROCEDURE — 36415 COLL VENOUS BLD VENIPUNCTURE: CPT

## 2023-09-21 PROCEDURE — 86334 IMMUNOFIX E-PHORESIS SERUM: CPT

## 2023-09-21 PROCEDURE — 80053 COMPREHEN METABOLIC PANEL: CPT

## 2023-09-21 PROCEDURE — 84165 PROTEIN E-PHORESIS SERUM: CPT

## 2023-09-21 PROCEDURE — 83521 IG LIGHT CHAINS FREE EACH: CPT

## 2023-09-21 RX ORDER — MIRABEGRON 50 MG/1
50 TABLET, FILM COATED, EXTENDED RELEASE ORAL DAILY
COMMUNITY
Start: 2023-09-21 | End: 2023-09-21

## 2023-09-22 ENCOUNTER — TELEPHONE (OUTPATIENT)
Dept: INTERNAL MEDICINE CLINIC | Facility: CLINIC | Age: 78
End: 2023-09-22

## 2023-09-25 LAB
ALBUMIN SERPL ELPH-MCNC: 3.81 G/DL (ref 3.75–5.21)
ALBUMIN/GLOB SERPL: 1.32 {RATIO} (ref 1–2)
ALPHA1 GLOB SERPL ELPH-MCNC: 0.27 G/DL (ref 0.19–0.46)
ALPHA2 GLOB SERPL ELPH-MCNC: 0.72 G/DL (ref 0.48–1.05)
B-GLOBULIN SERPL ELPH-MCNC: 0.88 G/DL (ref 0.68–1.23)
GAMMA GLOB SERPL ELPH-MCNC: 1.01 G/DL (ref 0.62–1.7)
KAPPA LC FREE SER-MCNC: 3.11 MG/DL (ref 0.33–1.94)
KAPPA LC FREE/LAMBDA FREE SER NEPH: 1.78 {RATIO} (ref 0.26–1.65)
LAMBDA LC FREE SERPL-MCNC: 1.75 MG/DL (ref 0.57–2.63)
PROT SERPL-MCNC: 6.7 G/DL (ref 6.4–8.2)

## 2023-09-27 ENCOUNTER — TELEPHONE (OUTPATIENT)
Dept: INTERNAL MEDICINE CLINIC | Facility: CLINIC | Age: 78
End: 2023-09-27

## 2023-09-27 NOTE — TELEPHONE ENCOUNTER
Called patient per HIPAA left Dr. Sherin Romero message. Patient just called anni with eye dr. Mandi Dickens. To Dr. Marissa Carpio.
Discussed with 's office. I did leave information that I would like to either speak to Dr. Hanna Rod get a fax of his last office visit just to find out what he saw in patient's eyes. Patient will be getting carotid Doppler.
Patient is calling with his eye doctor's information    Dr Regulo Spears  Phone # [phone]
Please let patient know that I did speak to his ophthalmologist.  He should be scheduling his carotid Doppler test.      ( Discussed with . 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   He should be going for his carotid artery test. )
Please let patient know that I thought his results from yesterday came out good. Electrolytes good. Blood sugar just minimally elevated but we were not fasting. That is okay. His cholesterol is in good range. I was satisfied with his lab results. His ask him the name of the eye doctor that we spoke about yesterday. He can go ahead and schedule his carotid Doppler that we spoke about yesterday. There is one test that is still being worked on and we should have the results in the next several days or so. We will call if there is any abnormalities.
Spoke with patient. Relayed MD message. Pt verbalized understanding.
None

## 2023-10-04 ENCOUNTER — E-ADVICE (OUTPATIENT)
Dept: UROLOGY | Age: 78
End: 2023-10-04

## 2023-10-09 ENCOUNTER — HOSPITAL ENCOUNTER (OUTPATIENT)
Dept: ULTRASOUND IMAGING | Facility: HOSPITAL | Age: 78
Discharge: HOME | End: 2023-10-09
Attending: INTERNAL MEDICINE
Payer: MEDICARE

## 2023-10-09 ENCOUNTER — TELEPHONE (OUTPATIENT)
Dept: INTERNAL MEDICINE CLINIC | Facility: CLINIC | Age: 78
End: 2023-10-09

## 2023-10-09 DIAGNOSIS — G45.9 TIA (TRANSIENT ISCHEMIC ATTACK): ICD-10-CM

## 2023-10-09 DIAGNOSIS — I65.29 STENOSIS OF CAROTID ARTERY, UNSPECIFIED LATERALITY: ICD-10-CM

## 2023-10-09 PROCEDURE — 93880 EXTRACRANIAL BILAT STUDY: CPT | Performed by: INTERNAL MEDICINE

## 2023-10-09 NOTE — TELEPHONE ENCOUNTER
Please let patient know that his carotid Doppler came out satisfactory. There was no significant narrowing. There was mention of some mild \"plaquing\" in both his carotid arteries but this is not unusual because of his history. This is already being taken care of by the cholesterol medication he is on. For now no changes needed.

## 2023-11-26 ENCOUNTER — PATIENT MESSAGE (OUTPATIENT)
Dept: INTERNAL MEDICINE CLINIC | Facility: CLINIC | Age: 78
End: 2023-11-26

## 2023-11-26 DIAGNOSIS — R04.0 FREQUENT NOSEBLEEDS: Primary | ICD-10-CM

## 2023-11-29 NOTE — TELEPHONE ENCOUNTER
From: Alon Cleaning  To: Lottie Akins  Sent: 11/26/2023 3:37 PM CST  Subject: Frequent nosebleeds from one nostril    Hi - actually [facility] sending this message to ask for a referral for an ENT at [facility]. Mac Dupont has been experiencing nosebleeds from just one nostril. The nosebleeds don't last too long, but they have become more frequent in the last couple of months. I think he should see an Otolaryngologist to ensure there isn't something serious going on. Thanks, I look forward to your reply.   Elliott Conti

## 2023-12-06 ENCOUNTER — OFFICE VISIT (OUTPATIENT)
Dept: OTOLARYNGOLOGY | Facility: CLINIC | Age: 78
End: 2023-12-06
Payer: MEDICARE

## 2023-12-06 VITALS — HEIGHT: 66.7 IN | WEIGHT: 153 LBS | BODY MASS INDEX: 24.3 KG/M2

## 2023-12-06 DIAGNOSIS — J34.2 DEVIATED NASAL SEPTUM: ICD-10-CM

## 2023-12-06 DIAGNOSIS — R04.0 EPISTAXIS: Primary | ICD-10-CM

## 2023-12-06 DIAGNOSIS — J34.3 HYPERTROPHY OF NASAL TURBINATES: ICD-10-CM

## 2023-12-06 PROCEDURE — 31231 NASAL ENDOSCOPY DX: CPT | Performed by: STUDENT IN AN ORGANIZED HEALTH CARE EDUCATION/TRAINING PROGRAM

## 2023-12-06 PROCEDURE — 99203 OFFICE O/P NEW LOW 30 MIN: CPT | Performed by: STUDENT IN AN ORGANIZED HEALTH CARE EDUCATION/TRAINING PROGRAM

## 2023-12-06 PROCEDURE — 1159F MED LIST DOCD IN RCRD: CPT | Performed by: STUDENT IN AN ORGANIZED HEALTH CARE EDUCATION/TRAINING PROGRAM

## 2023-12-06 PROCEDURE — 1160F RVW MEDS BY RX/DR IN RCRD: CPT | Performed by: STUDENT IN AN ORGANIZED HEALTH CARE EDUCATION/TRAINING PROGRAM

## 2023-12-06 PROCEDURE — 3008F BODY MASS INDEX DOCD: CPT | Performed by: STUDENT IN AN ORGANIZED HEALTH CARE EDUCATION/TRAINING PROGRAM

## 2023-12-06 RX ORDER — OXYMETAZOLINE HYDROCHLORIDE 0.05 G/100ML
2 SPRAY NASAL 2 TIMES DAILY
Qty: 30 ML | Refills: 0 | Status: SHIPPED | OUTPATIENT
Start: 2023-12-06

## 2023-12-06 NOTE — PROGRESS NOTES
[place]  OTOLARYNGOLOGY - HEAD & NECK SURGERY    12/6/2023     Reason for Consultation:   Epistaxis    History of Present Illness:   Patient is a pleasant 77year old male who is being seen for Epistaxis from the right side over the last year and a half. He notes that this was always from the right side. He does not take any significant blood thinners and is only on a baby aspirin daily. He does note that at times the bleeding started from wiping his nose. He has not tried any conservative treatment with Vaseline, nasal saline or humidification. When he does have the nosebleeds they last for few minutes and then stop. He has not used any Afrin nasal spray. Of note he does have a history of squamous cell carcinoma of the base of tongue and was treated with both surgery which included a right neck dissection and postoperative chemotherapy and radiation. This was in 2014 and the patient has been cancer free ever since.      Past Medical History  Past Medical History:   Diagnosis Date    AAA (abdominal aortic aneurysm) ([address]     Appendicitis     Arthritis     ASHD (arteriosclerotic heart disease)     LIMA graft to LAD    Cancer ([address] 8/2014    Chronic hip pain     s/p spinal cord stimulator    Colon polyps     Congestive heart disease ([address]     Diabetes insipidus (HCC)     Elevated prostate specific antigen (PSA) 10/10/2013    Elevated PSA     Fall 2021    c/b rib fractures    Glaucoma     slight glaucoma in one eye    Hiatal hernia     Hypomagnesemia     Low TSH level     Lung disease     Asbestos related lung disease    Nonspecific abnormal serum enzyme levels 4/25/2013    Orthostatic hypotension     Other and unspecified hyperlipidemia     PEG (percutaneous endoscopic gastrostomy) status (HCC)     Pleural plaque     calcified pleural plaques    Pleurisy 4/13/2012    Tongue cancer ([address] 2014    squamous cell carcinoma       Past Surgical History  Past Surgical History:   Procedure Laterality Date APPENDECTOMY  2009    BACK SURGERY  2020    CABG  2003    single bypass LIMA graft to LAD    CATARACT  04/2019    bilateral    COLONOSCOPY  2021    IR REPAIR AAA ENDOVASCULAR  2015    KNEE REPLACEMENT SURGERY  2021    KNEE SURGERY Right 1972    cartilage removed; replacement 1/2021    OTHER SURGICAL HISTORY  8/2014    Modified Neck Dissection    OTHER SURGICAL HISTORY  8/2014    Excision of Tongue Lesion    OTHER SURGICAL HISTORY      repair abdominal aneurysm    OTHER SURGICAL HISTORY  11/2020    spinal cord stimulator    TONSILLECTOMY         Family History  Family History   Problem Relation Age of Onset    Cancer Father         bladder cancer    Heart Disease Father         congenital heart disease    Heart Attack Father 72        MI - cause of death    Diabetes Father     Heart Disorder Father     Cancer Sister         lung cancer    Cancer Brother         lung cancer    Heart Disease Brother         CAD    Heart Attack Brother 62        MI    Diabetes Brother     Heart Disorder Brother     Heart Disease Mother         CAD    Heart Surgery Mother [de-identified]        CABG    Dementia Mother     Heart Disorder Mother     Heart Disease Other         CAD       Social History  Pediatric History   Patient Parents    Not on file     Other Topics Concern     Service Not Asked    Blood Transfusions Not Asked    Caffeine Concern Yes     Comment: Coffee 1 cup daily    Occupational Exposure Not Asked    Hobby Hazards Not Asked    Sleep Concern Not Asked    Stress Concern Not Asked    Weight Concern Not Asked    Special Diet Not Asked    Back Care Not Asked    Exercise Yes     Comment: 3 days weekly    Bike Helmet Not Asked    Seat Belt Not Asked    Self-Exams Not Asked   Social History Narrative    Not on file           Current Medications:  Current Outpatient Medications   Medication Sig Dispense Refill    Mirabegron ER 50 MG Oral Tablet 24 Hr Take 1 tablet (50 mg total) by mouth daily.       acetaminophen 500 MG Oral Tab Take 1 tablet (500 mg total) by mouth every 6 (six) hours as needed for Pain. atorvastatin 40 MG Oral Tab Take 1 tablet (40 mg total) by mouth daily. Multiple Vitamins-Minerals (ICAPS AREDS FORMULA) Oral Tab Take 1 tablet by mouth daily. aspirin 81 MG Oral Tab Take 1 tablet (81 mg total) by mouth daily. Vitamin C (VITAMIN C) 500 MG Oral Tab Take 1 tablet (500 mg total) by mouth daily. Multiple Vitamins-Minerals (MENS MULTIVITAMIN PLUS) Oral Tab Take 1 tablet by mouth daily. Calcium Carb-Cholecalciferol 600-200 MG-UNIT Oral Tab Take 1 tablet by mouth 2 (two) times daily. Allergies  No Known Allergies    Review of Systems:   A comprehensive 10 point review of systems was completed. Pertinent positives and negatives noted in the the HPI. Physical Exam:   There were no vitals taken for this visit. GENERAL: No acute distress, Comfortable appearing  FACE: HB 1/6, Normal Animation  HEAD: Normocephalic  EYES: EOMI, pupils equil  EARS: Bilateral Auricles Symmetric, bilateral cerumen  NOSE: Nares patent bilaterally  ORAL CAVITY: Tongue mobile, Oropharynx clear, Floor of mouth clear, Posterior oropharynx normal  NECK: No palpable lymphadenopathy, thyroid not palpable, nontender    PROCEDURE: BILATERAL RIGID NASAL ENDOSCOPY  Bilateral rigid nasal endoscopy (23892) was performed. Verbal consent was obtained from the patient to proceed with rigid nasal endoscopy. The nasal cavity was decongested and topically anesthetized with a combination of Oxymetazoline and 4% Lidocaine. A rigid 4mm 30 degree nasal endoscope connected to a high-definition endoscopy tower was used to examine both nasal cavities. Digital photos and/or videos of relevant exam findings were obtained. The inferior meatus, inferior turbinate, nasopharynx, middle meatus, middle turbinate, superior meatus, superior turbinate, and sphenoethmoidal recess were examined bilaterally, with any exceptions as noted below.  At the completion of the procedure the endoscope was removed. The patient tolerated the procedure well. There were no complications. Findings: The bilateral inferior turbinates were normal. The Septum was deviated to the left. The middle meatus was patent bilaterally. There were no obvious masses or polyps noted. There were some prominent vessels on the right caudal septum      Results:     Laboratory Data:  Lab Results   Component Value Date    WBC 5.1 09/21/2023    HGB 13.7 09/21/2023    HCT 42.6 09/21/2023    .0 09/21/2023    CREATSERUM 1.08 09/21/2023    BUN 23 (H) 09/21/2023     09/21/2023    K 4.5 09/21/2023     09/21/2023    CO2 31.0 09/21/2023     (H) 09/21/2023    CA 9.3 09/21/2023    ALB 3.5 09/21/2023    ALB 3.81 09/21/2023    ALKPHO 105 09/21/2023    TP 6.9 09/21/2023    TP 6.7 09/21/2023    AST 21 09/21/2023    ALT 30 09/21/2023    T4F 1.43 04/10/2015    TSH 1.660 09/21/2023    GGT 4 (L) 09/10/2019    MG 1.6 (L) 12/15/2014    PHOS 3.0 09/19/2022    CK 66 02/27/2020    B12 614 07/06/2021         Imaging:  No results found. Impression:   Epistaxis, right  Deviated nasal septum  Rhinorrhea  Chronic rhinitis    Recommendations:  I discussed with the patient that we should start with conservative therapy including application of Vaseline 2-3 times a day to the bilateral nasal cavities, as well as humidification inside the home, and avoidance of digital nasal trauma  If he continues to have nosebleeds he will return to see me and we will perform cauterization inside the office    Thank you for allowing me to participate in the care of your patient. Chata Waller DO   Otolaryngology/Rhinology, Sinus, and Endoscopic Skull Base Surgery  [place] [address] [address]  Phone [phone]  Fax [phone]  12/6/2023  10:58 AM  12/6/2023

## 2024-01-01 ENCOUNTER — EXTERNAL RECORD (OUTPATIENT)
Dept: RADIATION ONCOLOGY | Age: 79
End: 2024-01-01

## 2024-03-21 ENCOUNTER — LAB ENCOUNTER (OUTPATIENT)
Dept: LAB | Age: 79
End: 2024-03-21
Attending: INTERNAL MEDICINE
Payer: MEDICARE

## 2024-03-21 ENCOUNTER — OFFICE VISIT (OUTPATIENT)
Dept: INTERNAL MEDICINE CLINIC | Facility: CLINIC | Age: 79
End: 2024-03-21

## 2024-03-21 VITALS
WEIGHT: 159 LBS | BODY MASS INDEX: 23.55 KG/M2 | DIASTOLIC BLOOD PRESSURE: 74 MMHG | OXYGEN SATURATION: 96 % | RESPIRATION RATE: 16 BRPM | HEIGHT: 69 IN | SYSTOLIC BLOOD PRESSURE: 126 MMHG | TEMPERATURE: 98 F | HEART RATE: 71 BPM

## 2024-03-21 DIAGNOSIS — Z00.00 ROUTINE HEALTH MAINTENANCE: ICD-10-CM

## 2024-03-21 DIAGNOSIS — G25.9 MOVEMENT DISORDER: ICD-10-CM

## 2024-03-21 DIAGNOSIS — I71.40 ABDOMINAL AORTIC ANEURYSM (AAA) WITHOUT RUPTURE, UNSPECIFIED PART (HCC): ICD-10-CM

## 2024-03-21 DIAGNOSIS — Z96.89 S/P INSERTION OF SPINAL CORD STIMULATOR: ICD-10-CM

## 2024-03-21 DIAGNOSIS — Z00.00 MEDICARE ANNUAL WELLNESS VISIT, SUBSEQUENT: Primary | ICD-10-CM

## 2024-03-21 DIAGNOSIS — D47.2 MGUS (MONOCLONAL GAMMOPATHY OF UNKNOWN SIGNIFICANCE): ICD-10-CM

## 2024-03-21 DIAGNOSIS — Z86.010 HISTORY OF ADENOMATOUS POLYP OF COLON: ICD-10-CM

## 2024-03-21 DIAGNOSIS — Z85.810 HISTORY OF TONGUE CANCER: ICD-10-CM

## 2024-03-21 DIAGNOSIS — E78.5 HYPERLIPIDEMIA, UNSPECIFIED HYPERLIPIDEMIA TYPE: ICD-10-CM

## 2024-03-21 DIAGNOSIS — K40.90 LEFT INGUINAL HERNIA: ICD-10-CM

## 2024-03-21 LAB
ALBUMIN SERPL-MCNC: 4.5 G/DL (ref 3.2–4.8)
ALBUMIN/GLOB SERPL: 1.6 {RATIO} (ref 1–2)
ALP LIVER SERPL-CCNC: 119 U/L
ALT SERPL-CCNC: 15 U/L
ANION GAP SERPL CALC-SCNC: 2 MMOL/L (ref 0–18)
AST SERPL-CCNC: 23 U/L (ref ?–34)
BASOPHILS # BLD AUTO: 0.03 X10(3) UL (ref 0–0.2)
BASOPHILS NFR BLD AUTO: 0.6 %
BILIRUB SERPL-MCNC: 1.1 MG/DL (ref 0.2–1.1)
BUN BLD-MCNC: 22 MG/DL (ref 9–23)
BUN/CREAT SERPL: 21.4 (ref 10–20)
CALCIUM BLD-MCNC: 10.2 MG/DL (ref 8.7–10.4)
CHLORIDE SERPL-SCNC: 109 MMOL/L (ref 98–112)
CHOLEST SERPL-MCNC: 131 MG/DL (ref ?–200)
CO2 SERPL-SCNC: 30 MMOL/L (ref 21–32)
CREAT BLD-MCNC: 1.03 MG/DL
DEPRECATED RDW RBC AUTO: 44.6 FL (ref 35.1–46.3)
EGFRCR SERPLBLD CKD-EPI 2021: 74 ML/MIN/1.73M2 (ref 60–?)
EOSINOPHIL # BLD AUTO: 0.08 X10(3) UL (ref 0–0.7)
EOSINOPHIL NFR BLD AUTO: 1.6 %
ERYTHROCYTE [DISTWIDTH] IN BLOOD BY AUTOMATED COUNT: 13.7 % (ref 11–15)
FASTING PATIENT LIPID ANSWER: YES
FASTING STATUS PATIENT QL REPORTED: YES
GLOBULIN PLAS-MCNC: 2.9 G/DL (ref 2.8–4.4)
GLUCOSE BLD-MCNC: 94 MG/DL (ref 70–99)
HCT VFR BLD AUTO: 43.6 %
HDLC SERPL-MCNC: 54 MG/DL (ref 40–59)
HGB BLD-MCNC: 14.6 G/DL
IMM GRANULOCYTES # BLD AUTO: 0.02 X10(3) UL (ref 0–1)
IMM GRANULOCYTES NFR BLD: 0.4 %
LDLC SERPL CALC-MCNC: 62 MG/DL (ref ?–100)
LYMPHOCYTES # BLD AUTO: 0.94 X10(3) UL (ref 1–4)
LYMPHOCYTES NFR BLD AUTO: 18.4 %
MCH RBC QN AUTO: 29.7 PG (ref 26–34)
MCHC RBC AUTO-ENTMCNC: 33.5 G/DL (ref 31–37)
MCV RBC AUTO: 88.6 FL
MONOCYTES # BLD AUTO: 0.53 X10(3) UL (ref 0.1–1)
MONOCYTES NFR BLD AUTO: 10.4 %
NEUTROPHILS # BLD AUTO: 3.52 X10 (3) UL (ref 1.5–7.7)
NEUTROPHILS # BLD AUTO: 3.52 X10(3) UL (ref 1.5–7.7)
NEUTROPHILS NFR BLD AUTO: 68.6 %
NONHDLC SERPL-MCNC: 77 MG/DL (ref ?–130)
OSMOLALITY SERPL CALC.SUM OF ELEC: 295 MOSM/KG (ref 275–295)
PLATELET # BLD AUTO: 194 10(3)UL (ref 150–450)
POTASSIUM SERPL-SCNC: 4.4 MMOL/L (ref 3.5–5.1)
PROT SERPL-MCNC: 7.4 G/DL (ref 5.7–8.2)
RBC # BLD AUTO: 4.92 X10(6)UL
SODIUM SERPL-SCNC: 141 MMOL/L (ref 136–145)
TRIGL SERPL-MCNC: 72 MG/DL (ref 30–149)
TSI SER-ACNC: 1.7 MIU/ML (ref 0.55–4.78)
VIT B12 SERPL-MCNC: 666 PG/ML (ref 211–911)
VLDLC SERPL CALC-MCNC: 11 MG/DL (ref 0–30)
WBC # BLD AUTO: 5.1 X10(3) UL (ref 4–11)

## 2024-03-21 PROCEDURE — 84443 ASSAY THYROID STIM HORMONE: CPT

## 2024-03-21 PROCEDURE — 80061 LIPID PANEL: CPT

## 2024-03-21 PROCEDURE — 85025 COMPLETE CBC W/AUTO DIFF WBC: CPT

## 2024-03-21 PROCEDURE — 80053 COMPREHEN METABOLIC PANEL: CPT

## 2024-03-21 PROCEDURE — 36415 COLL VENOUS BLD VENIPUNCTURE: CPT

## 2024-03-21 PROCEDURE — 82607 VITAMIN B-12: CPT

## 2024-03-21 NOTE — TELEPHONE ENCOUNTER
Not prescribed by our office (uro)    Current refill request refused due to refill is either a duplicate request or has active refills at the pharmacy.  Check previous templates.    Requested Prescriptions     Pending Prescriptions Disp Refills    Mirabegron ER 50 MG Oral Tablet 24 Hr 30 tablet 0     Sig: Take 1 tablet (50 mg total) by mouth daily.

## 2024-03-21 NOTE — PROGRESS NOTES
Baron Cleaning is a 78 year old male.  HPI:     Chief Complaint   Patient presents with    Physical     MA      Baron comes in for Medicare annual wellness visit    He will be due for CTA of his abdomen to follow-up on his endograft abdominal aortic aneurysm repair.  Last one was June 2022.    Will be due this June.  I asked him to contact .    Will get updated labs.    He also has a history of CABG.  He follows with Dr. Mejía.  He is up-to-date.  He is asymptomatic    He did not get his flu vaccine this year.  He wishes to not pursue COVID booster.  I did give him RSV vaccine literature.  I also discussed availability of Shingrix.  He is up-to-date with his pneumonia vaccine    He had his last colonoscopy July 2021.  History of polyps.  Next colonoscopy July 2026    A couple days ago he noticed a left lower inguinal area bulge and I do think this is a hernia.  It is reducible.  Soft.    Asked him to see Dr. Rainey.  I also put a note for Dr. Rainey whether patient would need a CT of the abdomen pelvis.  However this does clinically seem like a hernia however it is a little bit more proximal than a inguinal hernia however still soft and feeling reducible.    He does have neuropathy.  He has seen Dr. Bejarano.  He seems to be moving a little bit slower.  His facial expression is a little bit slower.  He is a little bit slower in answering questions but answering them accurately.  He indicates that his neuropathy slows him down.  He has had to give up refereeing basketball.  I did tell him about the possibility of Parkinson's disease.  He does not have a definite tremor that I can see but he does say that sometimes he will have a tremor in his hands.  Nonetheless I asked him to see Dr. Bejarano which she agrees to  Current Outpatient Medications   Medication Sig Dispense Refill    Mirabegron ER 50 MG Oral Tablet 24 Hr Take 1 tablet (50 mg total) by mouth daily.      acetaminophen 500 MG Oral Tab Take 1  tablet (500 mg total) by mouth every 6 (six) hours as needed for Pain.      atorvastatin 40 MG Oral Tab Take 1 tablet (40 mg total) by mouth daily.      Multiple Vitamins-Minerals (ICAPS AREDS FORMULA) Oral Tab Take 1 tablet by mouth daily.      aspirin 81 MG Oral Tab Take 1 tablet (81 mg total) by mouth daily.      Vitamin C (VITAMIN C) 500 MG Oral Tab Take 1 tablet (500 mg total) by mouth daily.      Multiple Vitamins-Minerals (MENS MULTIVITAMIN PLUS) Oral Tab Take 1 tablet by mouth daily.      Calcium Carb-Cholecalciferol 600-200 MG-UNIT Oral Tab Take 1 tablet by mouth 2 (two) times daily.      oxymetazoline 0.05 % Nasal Solution 2 sprays by Nasal route 2 (two) times daily. Use on the side that is bleeding and then hold pressure on the soft part of the nose. Use only as needed for bleeding 30 mL 0      Past Medical History:   Diagnosis Date    AAA (abdominal aortic aneurysm) (HCC)     Appendicitis     Arthritis     ASHD (arteriosclerotic heart disease)     LIMA graft to LAD    Cancer (HCC) 8/2014    Chronic hip pain     s/p spinal cord stimulator    Colon polyps     Congestive heart disease (HCC)     Diabetes insipidus (HCC)     Elevated prostate specific antigen (PSA) 10/10/2013    Elevated PSA     Fall 2021    c/b rib fractures    Glaucoma     slight glaucoma in one eye    Hiatal hernia     Hypomagnesemia     Low TSH level     Lung disease     Asbestos related lung disease    Nonspecific abnormal serum enzyme levels 4/25/2013    Orthostatic hypotension     Other and unspecified hyperlipidemia     PEG (percutaneous endoscopic gastrostomy) status (HCC)     Pleural plaque     calcified pleural plaques    Pleurisy 4/13/2012    Tongue cancer (HCC) 2014    squamous cell carcinoma      Social History:  Social History     Socioeconomic History    Marital status:    Tobacco Use    Smoking status: Former     Packs/day: 0.75     Years: 25.00     Additional pack years: 0.00     Total pack years: 18.75     Types:  Cigarettes     Quit date: 6/15/2003     Years since quittin.7     Passive exposure: Past    Smokeless tobacco: Never   Vaping Use    Vaping Use: Never used   Substance and Sexual Activity    Alcohol use: No     Alcohol/week: 0.0 standard drinks of alcohol    Drug use: No   Other Topics Concern    Caffeine Concern Yes     Comment: Coffee 1 cup daily    Exercise Yes     Comment: 3 days weekly        REVIEW OF SYSTEMS:   GENERAL HEALTH:  feels well otherwise  RESPIRATORY:  Voices no shortness of breath with exertion or cough  CARDIOVASCULAR:  Voices no chest pain on exertion or shortness of breath  GI:   Voices no abdominal pain or changes of bowels   :Viices no urning or frequency of urination.  NEURO:  Voices no  headaches or dizziness    EXAM:   /74   Pulse 71   Temp 98.1 °F (36.7 °C) (Oral)   Resp 16   Ht 5' 9\" (1.753 m)   Wt 159 lb (72.1 kg)   SpO2 96%   BMI 23.48 kg/m²     GENERAL:  well developed, well nourished, in no apparent distress  SKIN:  no rashes , I did see a lesion on his scalp and he indicates that he just saw the dermatologist who had some cryotherapy on his scalp and left cheek area.  This was for premalignant abnormalities.  HEENT: atraumatic.    Pharynx normal without exudate.  EYES:  PERRL. Sclera anicteric.  NECK:  Supple,  no adenopathy,  thyroid normal  LUNGS:  clear to auscultation.  Effort normal  CARDIO:  RRR without murmur.   S1 and S2 normal  GI:  good BS's,  no masses,   HSM or tenderness  EXTREMITIES : no cyanosis, clubbing or edema  NEURO: Gait slow.    ASSESSMENT AND PLAN:     1. Medicare annual wellness visit, subsequent  Medicare wellness wellness visit    2. Hyperlipidemia, unspecified hyperlipidemia type  Hyperlipidemia.  On statin.  - CBC With Differential With Platelet; Future  - Comp Metabolic Panel (14); Future  - Lipid Panel; Future  - TSH W Reflex To Free T4; Future  - Vitamin B12 with Reflex to MMA; Future    3. MGUS (monoclonal gammopathy of unknown  significance)  History of MGUS.  He no longer needs any follow-up.  - CBC With Differential With Platelet; Future  - Comp Metabolic Panel (14); Future  - Lipid Panel; Future  - TSH W Reflex To Free T4; Future  - Vitamin B12 with Reflex to MMA; Future    4. Abdominal aortic aneurysm (AAA) without rupture, unspecified part (HCC)  Abdominal aortic aneurysm.  Next CT scan will be due June 2024.  He will notify interventional radiology for order.    5. History of tongue cancer  No evidence of disease.  He is done with his follow-up as this was remote.    6. History of adenomatous polyp of colon  Next colonoscopy July 2026.  With Dr. Rainey    7. S/P insertion of spinal cord stimulator  Spinal cord stimulator    8. Routine health maintenance  See vaccine discussion    9. Movement disorder  Possible movement disorder.  I did tell him that I was not able to diagnose Parkinson's disease but would be a good idea to have neurology confirm whether he does or does not.  I asked him to see Dr. Bejarano.    This visit was 30 minutes.  I spent 10 minutes before visit preparing and reviewing old records.  Greater than 50% of the visit was engaged in counseling and review of past data.    Problem list as noted in electronic health record reviewed.  Stable.  Will be followed.    Abdominal aneurysm (HCC)-follows with .  Due this June 2024 for repeat scan  Asbestosis (HCC)-asymptomatic.  Coronary atherosclerosis-stable.   Personal history of exposure to asbestos, presenting hazards to health-asymptomatic.  Hyperlipidemia-update lipids  Tongue cancer (HCC)-resolved.  No evidence of disease  Tubular adenoma of colon-next colonoscopy July 2026  COPD (chronic obstructive pulmonary disease) (HCC)-stable.  Stable.  Arthritis of facet joint of lumbar spine-stable.  Numbness and tingling of both legs-neuropathy.  DDD (degenerative disc disease), lumbar-stable.  Will follow.  Foraminal stenosis of lumbar region-stable.  Will  follow.  Elevated serum immunoglobulin free light chain level-stable.  No monoclonal proteins found.   Ric as Reviewed      Follow-up in 6 months    The patient indicates understanding of these issues and agrees to the plan.    Be Alvarado MD  3/21/2024  1:39 PM

## 2024-03-22 ENCOUNTER — TELEPHONE (OUTPATIENT)
Dept: INTERNAL MEDICINE CLINIC | Facility: CLINIC | Age: 79
End: 2024-03-22

## 2024-03-26 ENCOUNTER — OFFICE VISIT (OUTPATIENT)
Dept: NEUROLOGY | Facility: CLINIC | Age: 79
End: 2024-03-26
Payer: MEDICARE

## 2024-03-26 VITALS — WEIGHT: 159 LBS | BODY MASS INDEX: 23.55 KG/M2 | HEIGHT: 69 IN

## 2024-03-26 DIAGNOSIS — G20.C PARKINSONISM, UNSPECIFIED PARKINSONISM TYPE (HCC): Primary | ICD-10-CM

## 2024-03-26 DIAGNOSIS — G62.9 NEUROPATHY: ICD-10-CM

## 2024-03-26 PROCEDURE — 99213 OFFICE O/P EST LOW 20 MIN: CPT | Performed by: OTHER

## 2024-03-26 PROCEDURE — 1160F RVW MEDS BY RX/DR IN RCRD: CPT | Performed by: OTHER

## 2024-03-26 PROCEDURE — 1159F MED LIST DOCD IN RCRD: CPT | Performed by: OTHER

## 2024-03-26 PROCEDURE — 3008F BODY MASS INDEX DOCD: CPT | Performed by: OTHER

## 2024-03-26 NOTE — PROGRESS NOTES
[facility]  [address], [address]  [facility] [id number] [phone]          Established  Follow Up Visit       Date: March 26, 2024  Patient Name: Baron Cleaning   MRN: [id number]  Primary physician: Navdeep Galicia  [facility] [address]    Interval History:   --RBD syndrome - kicking, yelling, moving furniture by hitting nightstand, no injuries.  He has accidentally hit his wife during sleep but no injuries.  Now sleep separately.      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  Constipation.  Has noticed hypersalivation.      --Fell backward downstairs after he passed out, around 2020.      --Tremors: notices an action tremor when trying to pour cream into coffee, brushing teeth.  Denies resting tremor.      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      acetaminophen 500 MG Oral Tab Take 1 tablet (500 mg total) by mouth every 6 (six) hours as needed for Pain.      atorvastatin 40 MG Oral Tab Take 1 tablet (40 mg total) by mouth daily.      Multiple Vitamins-Minerals (ICAPS AREDS FORMULA) Oral Tab Take 1 tablet by mouth daily.      aspirin 81 MG Oral Tab Take 1 tablet (81 mg total) by mouth daily.      Vitamin C (VITAMIN C) 500 MG Oral Tab Take 1 tablet (500 mg total) by mouth daily.      Multiple Vitamins-Minerals (MENS MULTIVITAMIN PLUS) Oral Tab Take 1 tablet by mouth daily.      Calcium Carb-Cholecalciferol 600-200 MG-UNIT Oral Tab Take 1 tablet by mouth 2 (two) times daily.       No current facility-administered medications on file prior to visit.         PHYSICAL EXAM:     Ht 69\"   Wt 159 lb (72.1 kg)   BMI 23.48 kg/m²   General appearance: Well appearing, and in  no acute distress  Skin: skin color, texture normal.  No rashes or lesions.    Head: Normocephalic, atraumatic.    Neurological exam:    Mental Status:   Attention/Concentration: intact attention on bedside test   Fund of knowledge: intact  Speech: no dysarthria or aphasia     Cranial Nerves:  Pupils: OD 4 mm to 3 mm;  OS 4 mm to 3 mm   Visual Fields: R and L visual fields full to confrontation  CN III, CN IV, CN VI (Extraocular movements): intact with right eye ptosis   CN V:   intact sensation to light touch in all three divisions of the trigeminal nerves bilaterally.  CN VII: normal facial muscle strength bilaterally  CN VIII: auditory acuity intact to bedside testing  CN IX/CN X: normal palate elevation    Hypomimia  Hypophonia       Motor Exam:   Muscle Bulk: No atrophy or fasciculations   Muscle Tone: normal tone in upper and lower extremities   Involuntary movements: none  Bradykinesia of UE > LE    Strength:  5/5 throughout       Sensory:   Light touch: intact in all 4 extremities.     Coordination:   Normal: Finger to nose: no ataxia or dysmetria     Gait:   Arises independently; normal posture; gait stable with normal stride length, rate, base and arm swing.     LABS/DATA:  Component      Latest Ref [facility] 3/21/2024   WBC      4.0 - 11.0 x10(3) uL 5.1    RBC      3.80 - 5.80 x10(6)uL 4.92    Hemoglobin      13.0 - 17.5 g/dL 14.6    Hematocrit      39.0 - 53.0 % 43.6    MCV      80.0 - 100.0 fL 88.6    MCH      26.0 - 34.0 pg 29.7    MCHC      31.0 - 37.0 g/dL 33.5    RDW-SD      35.1 - 46.3 fL 44.6    RDW      11.0 - 15.0 % 13.7    Platelet Count      150.0 - 450.0 10(3)uL 194.0    Prelim Neutrophil Abs      1.50 - 7.70 x10 (3) uL 3.52    Neutrophils Absolute      1.50 - 7.70 x10(3) uL 3.52    Lymphocytes Absolute      1.00 - 4.00 x10(3) uL 0.94 (L)    Monocytes Absolute      0.10 - 1.00 x10(3) uL 0.53    Eosinophils Absolute      0.00 - 0.70 x10(3) uL 0.08    Basophils Absolute      0.00 - 0.20 x10(3) uL  0.03    Immature Granulocyte Absolute      0.00 - 1.00 x10(3) uL 0.02    Neutrophils %      % 68.6    Lymphocytes %      % 18.4    Monocytes %      % 10.4    Eosinophils %      % 1.6    Basophils %      % 0.6    Immature Granulocyte %      % 0.4    Glucose      70 - 99 mg/dL 94    Sodium      136 - 145 mmol/L 141    Potassium      3.5 - 5.1 mmol/L 4.4    Chloride      98 - 112 mmol/L 109    Carbon Dioxide, Total      21.0 - 32.0 mmol/L 30.0    ANION GAP      0 - 18 mmol/L 2    BUN      9 - 23 mg/dL 22    CREATININE      0.70 - 1.30 mg/dL 1.03    BUN/CREATININE RATIO      10.0 - 20.0  21.4 (H)    CALCIUM      8.7 - 10.4 mg/dL 10.2    CALCULATED OSMOLALITY      275 - 295 mOsm/kg 295    EGFR      >=60 mL/min/1.73m2 74    ALT (SGPT)      10 - 49 U/L 15    AST (SGOT)      <=34 U/L 23    ALKALINE PHOSPHATASE      45 - 117 U/L 119 (H)    Total Bilirubin      0.2 - 1.1 mg/dL 1.1    PROTEIN, TOTAL      5.7 - 8.2 g/dL 7.4    Albumin      3.2 - 4.8 g/dL 4.5    Globulin      2.8 - 4.4 g/dL 2.9    A/G Ratio      1.0 - 2.0  1.6    Patient Fasting for CMP? Yes    TSH      0.550 - 4.780 mIU/mL 1.700    Vitamin B12      211 - 911 pg/mL 666       Legend:  (L) Low  (H) High      IMAGING:  Carotid US  CONCLUSION:   1. Bilateral carotid bifurcation/proximal ICA plaque without hemodynamic significance (<49% stenosis)..    2. Antegrade flow within both vertebral arteries     ASSESSMENT:  The patient is a 78 year old  man with past medical history of orthostatic hypotension, lumbar spondylosis with possible left L4 radiculopathy status post spinal cord stimulator, hyperlipidemia, osteopenia, glaucoma, coronary artery disease, abdominal aortic aneurysm, head/neck and tongue cancer presents to discuss REM behavior sleep disorder and to assess for parkinsonian features.  He was seen in the past for length-dependent peripheral neuropathy.     During initial office visit, his neurological examination was significant for length-dependent  peripheral neuropathy; baseline R eye ptosis.     He has mild parkinsonian features on exam: hypomimia, hypophonia, bradykinesia but lacks increased tone or tremors.     -Cannot have MRI due to spinal cord stimulator.  -Neuropathy labs: Elevated kappa, elevated kappa/lambda ratio.  Normal immunofixation.  Hemoglobin A1c 5.9 consistent with prediabetes.  Normal heavy metals, vitamin E, copper, B12/MMA, TSH, thiamine and vitamin B6 levels.    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  May represent early manifestation of disease course.    -Discussed DaTScan, trial of medication - would observe for now, if worsened by 6 months, will test or treat as indicated   -Discussed behavior sleep disorder precautions, he is already sleeping in a separate room from his wife, I advised padded bed railings and keeping furniture like nightstands away from the bed.       Length dependent peripheral neuropathy has seen hematology in 2021 for elevated kappa immunoglobulin free light chain.  -Podiatry follow up yearly     Follow up in 6 months     =Discussed indication, administration, dose, and side effects with patient of any medications personally prescribed. Patient was advised to let my office know if they have any questions or concerns.       Today, I personally spent 20 minutes in this case, including chart review, time spent with patient doing face to face evaluation w/ interview and exam and patient education, counseling, and time was spent in patient education, counseling, and coordination of care as described above.   Issues discussed: Diagnosis and implications on future health, benefits and side effects of present and future medications, test results as well as further testing and medications required.    This note was prepared using Dragon Medical voice recognition dictation software and as a result, errors may occur. When identified, these errors have  been corrected. While every attempt is made to correct errors during dictation, discrepancies may still exist    BRANDEN Bejarano DO   Staff Physician, Neurology   3/26/2024  9:03 AM

## 2024-04-17 ENCOUNTER — TELEPHONE (OUTPATIENT)
Dept: INTERNAL MEDICINE CLINIC | Facility: CLINIC | Age: 79
End: 2024-04-17

## 2024-04-17 NOTE — TELEPHONE ENCOUNTER
To Dr MCGINNIS and JOSE MANUEL Steiner...    Called and spoke with Ayesha/spouse. Per Ayesha \" Baron is doing a Little bit better after eating breakfast\" He was feeling weak and was pallor in color, but he ate an English muffin, glass of OJ and a cup of coffee and is more pink in color and not as shaky as he was earlier this morning\". Pt still c/o generalized weakness, is afebrile. Denied sx's of CVA( such as one sided weakness, mouth droop or slurred speech. Denied CP or SOB. Pt is s/p hernia sx on 4-5. Denied any abdominal pain or discomfort. Spouse stated that pt saw Dr Bejarano recently for evaluation of his Parkinson's disease. Per spouse,she feels  that pt's Parkinson sx's appear to be progressing after his hernia sx. Advised spouse that it best for pt to be evaluated and asked if she wanted me to call 911 or if she can take pt to the ED . Spouse stated she feels pt is stable enough for her to take him to the  now and will do so. Asked spouse to CB today with an update after UC visit.

## 2024-04-17 NOTE — TELEPHONE ENCOUNTER
Wife called stating pt. Had Hernia Surgery on 4/5/24.  On 4/13/24 he went out in a car for a visit with grand children.  On 4/14/24 he developed a temp of 100.2, extremely week, white in color, shaky, no energy, exhausted, falling asleep at the table, and he is slow to react to conversation.  Call transferred to triage.

## 2024-04-18 NOTE — TELEPHONE ENCOUNTER
Please call patient with Ayesha and his wife to see in follow-up and yesterday's recommendation for urgent care evaluation.  Was anything significant found?

## 2024-04-19 NOTE — TELEPHONE ENCOUNTER
We can let patient know that I did review Dr. Bejarano's office visit note.  It does look like Baron probably does have some features of Parkinson's but according to the note I read the actual diagnosis is not yet definite as far as I can tell.    Follow-up in 6 months was recommended and depending on symptoms then a decision for further evaluation or treatment can be made.

## 2024-04-19 NOTE — TELEPHONE ENCOUNTER
Spoke to pt spouse (ok per HIPAA) and advised on MD message below; spouse  verbalized understanding.

## 2024-04-19 NOTE — TELEPHONE ENCOUNTER
As FYI to DR VILLALPANDO -called spouse who states they did not go to uC - she states patient got more Alert by mid day , hs color came back  and he was more mobile.Yesterday he was absolutely fine . He saw DR. Bejarano who told them he has early onset of Parkinson. Spouse wants to know if  got report . RN instructed spouse if SX comeback she needs to call the office or go to  - verbalized understanding   stable

## 2024-05-28 ENCOUNTER — HOSPITAL ENCOUNTER (OUTPATIENT)
Dept: CT IMAGING | Facility: HOSPITAL | Age: 79
Discharge: HOME | End: 2024-05-28
Attending: RADIOLOGY

## 2024-05-28 DIAGNOSIS — I71.40 AAA (ABDOMINAL AORTIC ANEURYSM) (HCC): ICD-10-CM

## 2024-05-28 LAB
CREAT BLD-MCNC: 1 MG/DL
EGFRCR SERPLBLD CKD-EPI 2021: 77 ML/MIN/1.73M2 (ref 60–?)

## 2024-05-28 PROCEDURE — 82565 ASSAY OF CREATININE: CPT

## 2024-05-28 PROCEDURE — 74174 CTA ABD&PLVS W/CONTRAST: CPT | Performed by: RADIOLOGY

## 2024-06-03 NOTE — PROGRESS NOTES
S/p AAA endovascular repair 4/3/15.  This 9 year f/u CT demonstrates shrinkage of aneurysm sac, complete exclusion from flow with no endoleaks. Called patient c good result.  Plan f/u CTA in two years.

## 2024-09-10 ENCOUNTER — HOSPITAL ENCOUNTER (OUTPATIENT)
Dept: RADIATION ONCOLOGY | Age: 79
Discharge: STILL ADMITTED OR ADMINISTRATIVE | End: 2024-09-10

## 2024-09-10 PROCEDURE — X1094 NO CHARGE VISIT: HCPCS | Performed by: RADIOLOGY

## 2024-09-11 ENCOUNTER — APPOINTMENT (OUTPATIENT)
Dept: RADIATION ONCOLOGY | Age: 79
End: 2024-09-11
Attending: RADIOLOGY

## 2024-09-16 ENCOUNTER — EXTERNAL RECORD (OUTPATIENT)
Dept: RADIATION ONCOLOGY | Age: 79
End: 2024-09-16

## 2024-09-16 ENCOUNTER — HOSPITAL ENCOUNTER (OUTPATIENT)
Dept: RADIATION ONCOLOGY | Age: 79
Discharge: STILL ADMITTED OR ADMINISTRATIVE | End: 2024-09-16

## 2024-09-16 DIAGNOSIS — C01 CANCER OF BASE OF TONGUE  (CMD): Primary | ICD-10-CM

## 2024-09-16 PROCEDURE — X1094 NO CHARGE VISIT: HCPCS | Performed by: RADIOLOGY

## 2024-09-17 ENCOUNTER — OFFICE VISIT (OUTPATIENT)
Dept: NEUROLOGY | Facility: CLINIC | Age: 79
End: 2024-09-17
Payer: MEDICARE

## 2024-09-17 VITALS — HEIGHT: 68 IN | WEIGHT: 150 LBS | BODY MASS INDEX: 22.73 KG/M2

## 2024-09-17 DIAGNOSIS — G62.9 LENGTH-DEPENDENT PERIPHERAL NEUROPATHY: ICD-10-CM

## 2024-09-17 DIAGNOSIS — G20.A1 IDIOPATHIC PARKINSON'S DISEASE (HCC): Primary | ICD-10-CM

## 2024-09-17 PROCEDURE — 1160F RVW MEDS BY RX/DR IN RCRD: CPT | Performed by: OTHER

## 2024-09-17 PROCEDURE — 1159F MED LIST DOCD IN RCRD: CPT | Performed by: OTHER

## 2024-09-17 PROCEDURE — 99214 OFFICE O/P EST MOD 30 MIN: CPT | Performed by: OTHER

## 2024-09-17 PROCEDURE — 3008F BODY MASS INDEX DOCD: CPT | Performed by: OTHER

## 2024-09-17 PROCEDURE — 1125F AMNT PAIN NOTED PAIN PRSNT: CPT | Performed by: OTHER

## 2024-09-17 RX ORDER — CARBIDOPA AND LEVODOPA 25; 100 MG/1; MG/1
0.5 TABLET ORAL 3 TIMES DAILY
Qty: 135 TABLET | Refills: 0 | Status: SHIPPED | OUTPATIENT
Start: 2024-09-17 | End: 2024-12-16

## 2024-09-17 NOTE — PROGRESS NOTES
[facility]  [address], [address]  [facility] [id number] [phone]          Established  Follow Up Visit       Date: September 17, 2024  Patient Name: Baron Cleaning   MRN: [id number]  Primary physician: Navdeep Galicia  [facility] [address]    Interval History:   -- Patient has sleep disorder is worsening, tremors are worsened.  Recent [employer] message noted from a family member that the patient is now developing tremors, small handwriting, anosmia, issues sleeping, constipation, hypomimia, hypophonia and a stooped posture.    -He does feel constipated and manages this by drinking water.  He does not use any over-the-counter aids or fiber in his diet.  He wakes up frequently at night to use the restroom.    OUTPATIENT MEDICATIONS  Current Outpatient Medications on File Prior to Visit   Medication Sig Dispense Refill    Mirabegron ER 50 MG Oral Tablet 24 Hr Take 1 tablet (50 mg total) by mouth daily.      acetaminophen 500 MG Oral Tab Take 1 tablet (500 mg total) by mouth every 6 (six) hours as needed for Pain.      atorvastatin 40 MG Oral Tab Take 1 tablet (40 mg total) by mouth daily.      Multiple Vitamins-Minerals (ICAPS AREDS FORMULA) Oral Tab Take 1 tablet by mouth daily.      aspirin 81 MG Oral Tab Take 1 tablet (81 mg total) by mouth daily.      Vitamin C (VITAMIN C) 500 MG Oral Tab Take 1 tablet (500 mg total) by mouth daily.      Multiple Vitamins-Minerals (MENS MULTIVITAMIN PLUS) Oral Tab Take 1 tablet by mouth daily.      Calcium Carb-Cholecalciferol 600-200 MG-UNIT Oral Tab Take 1 tablet by mouth 2 (two) times daily.       No current facility-administered medications on file prior to visit.         PHYSICAL EXAM:     Ht 68\"   Wt 150 lb (68 kg)   BMI 22.81 kg/m²   General appearance: Well appearing, and in no acute distress  Skin: skin color, texture normal.  No rashes or lesions.    Head: Normocephalic, atraumatic.    Neurological exam:    Mental Status:    Attention/Concentration: intact attention on bedside test   Fund of knowledge: intact  Speech: no dysarthria or aphasia     Hypomimia and hypophonia  Bradykinetic throughout   No observable resting tremor with very mildly increased tone bilateral upper extremities  Stooped posture with relatively preserved arm swing      LABS/DATA:  N/A      IMAGING:  N/A    ASSESSMENT:  The patient is a 78 year old  man with past medical history of orthostatic hypotension, lumbar spondylosis with possible left L4 radiculopathy status post spinal cord stimulator, hyperlipidemia, osteopenia, glaucoma, coronary artery disease, abdominal aortic aneurysm, head/neck and tongue cancer presents for follow up.       He has mild parkinsonian features on exam: hypomimia, hypophonia, bradykinesia; now with rigidity of upper extremities.     -Cannot have MRI due to spinal cord stimulator.  -Neuropathy labs: Elevated kappa, elevated kappa/lambda ratio.  Normal immunofixation.  Hemoglobin A1c 5.9 consistent with prediabetes.  Normal heavy metals, vitamin E, copper, B12/MMA, TSH, thiamine and vitamin B6 levels.     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  No resting tremor noted.  Likely has slow disease process.  - Trial of Sinemet 0.5 tablets 3 times daily  - May consider DaTscan depending on response to Sinemet  - Discussed management of constipation with increasing fiber intake, Metamucil or MiraLAX as needed  - We discussed fall precautions in detail  - Discussed behavior sleep disorder precautions at prior office visit, he is already sleeping in a separate room from his wife, I advised padded bed railings and keeping furniture like nightstands away from the bed.       Length dependent peripheral neuropathy has seen hematology in 2021 for elevated kappa immunoglobulin free light chain.  -Podiatry follow up yearly     Follow up in 3 months      Discussed indication, administration, dose, and side effects with patient of any medications personally prescribed. Patient was advised to let my office know if they have any questions or concerns.       Today, I personally spent 20 minutes in this case, including chart review, time spent with patient doing face to face evaluation w/ interview and exam and patient education, counseling, and time was spent in patient education, counseling, and coordination of care as described above.   Issues discussed: Diagnosis and implications on future health, benefits and side effects of present and future medications, test results as well as further testing and medications required.    This note was prepared using Dragon Medical voice recognition dictation software and as a result, errors may occur. When identified, these errors have been corrected. While every attempt is made to correct errors during dictation, discrepancies may still exist    BRANDEN Bejarano DO   Staff Physician, Neurology   9/17/2024  2:52 PM

## 2024-09-19 ENCOUNTER — LAB ENCOUNTER (OUTPATIENT)
Dept: LAB | Age: 79
End: 2024-09-19
Attending: INTERNAL MEDICINE
Payer: MEDICARE

## 2024-09-19 ENCOUNTER — OFFICE VISIT (OUTPATIENT)
Dept: OTOLARYNGOLOGY | Facility: CLINIC | Age: 79
End: 2024-09-19
Payer: MEDICARE

## 2024-09-19 DIAGNOSIS — R04.0 EPISTAXIS: ICD-10-CM

## 2024-09-19 DIAGNOSIS — J34.3 HYPERTROPHY OF NASAL TURBINATES: ICD-10-CM

## 2024-09-19 DIAGNOSIS — H61.23 BILATERAL HEARING LOSS DUE TO CERUMEN IMPACTION: ICD-10-CM

## 2024-09-19 DIAGNOSIS — R13.12 OROPHARYNGEAL DYSPHAGIA: ICD-10-CM

## 2024-09-19 DIAGNOSIS — J34.2 DEVIATED NASAL SEPTUM: ICD-10-CM

## 2024-09-19 DIAGNOSIS — C01 PRIMARY SQUAMOUS CELL CARCINOMA OF BASE OF TONGUE (HCC): Primary | ICD-10-CM

## 2024-09-19 DIAGNOSIS — E78.5 HYPERLIPIDEMIA, UNSPECIFIED HYPERLIPIDEMIA TYPE: ICD-10-CM

## 2024-09-19 LAB
ALBUMIN SERPL-MCNC: 4 G/DL (ref 3.2–4.8)
ALBUMIN/GLOB SERPL: 1.3 {RATIO} (ref 1–2)
ALP LIVER SERPL-CCNC: 127 U/L
ALT SERPL-CCNC: 22 U/L
ANION GAP SERPL CALC-SCNC: 6 MMOL/L (ref 0–18)
AST SERPL-CCNC: 25 U/L (ref ?–34)
BASOPHILS # BLD AUTO: 0.02 X10(3) UL (ref 0–0.2)
BASOPHILS NFR BLD AUTO: 0.4 %
BILIRUB SERPL-MCNC: 0.9 MG/DL (ref 0.2–1.1)
BUN BLD-MCNC: 21 MG/DL (ref 9–23)
BUN/CREAT SERPL: 19.8 (ref 10–20)
CALCIUM BLD-MCNC: 9.4 MG/DL (ref 8.7–10.4)
CHLORIDE SERPL-SCNC: 109 MMOL/L (ref 98–112)
CHOLEST SERPL-MCNC: 118 MG/DL (ref ?–200)
CO2 SERPL-SCNC: 28 MMOL/L (ref 21–32)
CREAT BLD-MCNC: 1.06 MG/DL
DEPRECATED RDW RBC AUTO: 45.9 FL (ref 35.1–46.3)
EGFRCR SERPLBLD CKD-EPI 2021: 71 ML/MIN/1.73M2 (ref 60–?)
EOSINOPHIL # BLD AUTO: 0.06 X10(3) UL (ref 0–0.7)
EOSINOPHIL NFR BLD AUTO: 1.1 %
ERYTHROCYTE [DISTWIDTH] IN BLOOD BY AUTOMATED COUNT: 14.1 % (ref 11–15)
FASTING PATIENT LIPID ANSWER: YES
FASTING STATUS PATIENT QL REPORTED: YES
GLOBULIN PLAS-MCNC: 3.2 G/DL (ref 2–3.5)
GLUCOSE BLD-MCNC: 96 MG/DL (ref 70–99)
HCT VFR BLD AUTO: 41.5 %
HDLC SERPL-MCNC: 50 MG/DL (ref 40–59)
HGB BLD-MCNC: 13.8 G/DL
IMM GRANULOCYTES # BLD AUTO: 0.02 X10(3) UL (ref 0–1)
IMM GRANULOCYTES NFR BLD: 0.4 %
LDLC SERPL CALC-MCNC: 55 MG/DL (ref ?–100)
LYMPHOCYTES # BLD AUTO: 0.93 X10(3) UL (ref 1–4)
LYMPHOCYTES NFR BLD AUTO: 17.7 %
MCH RBC QN AUTO: 29.8 PG (ref 26–34)
MCHC RBC AUTO-ENTMCNC: 33.3 G/DL (ref 31–37)
MCV RBC AUTO: 89.6 FL
MONOCYTES # BLD AUTO: 0.56 X10(3) UL (ref 0.1–1)
MONOCYTES NFR BLD AUTO: 10.7 %
NEUTROPHILS # BLD AUTO: 3.66 X10 (3) UL (ref 1.5–7.7)
NEUTROPHILS # BLD AUTO: 3.66 X10(3) UL (ref 1.5–7.7)
NEUTROPHILS NFR BLD AUTO: 69.7 %
NONHDLC SERPL-MCNC: 68 MG/DL (ref ?–130)
OSMOLALITY SERPL CALC.SUM OF ELEC: 299 MOSM/KG (ref 275–295)
PLATELET # BLD AUTO: 175 10(3)UL (ref 150–450)
POTASSIUM SERPL-SCNC: 4.4 MMOL/L (ref 3.5–5.1)
PROT SERPL-MCNC: 7.2 G/DL (ref 5.7–8.2)
RBC # BLD AUTO: 4.63 X10(6)UL
SODIUM SERPL-SCNC: 143 MMOL/L (ref 136–145)
TRIGL SERPL-MCNC: 58 MG/DL (ref 30–149)
VLDLC SERPL CALC-MCNC: 8 MG/DL (ref 0–30)
WBC # BLD AUTO: 5.3 X10(3) UL (ref 4–11)

## 2024-09-19 PROCEDURE — 1160F RVW MEDS BY RX/DR IN RCRD: CPT | Performed by: STUDENT IN AN ORGANIZED HEALTH CARE EDUCATION/TRAINING PROGRAM

## 2024-09-19 PROCEDURE — 80053 COMPREHEN METABOLIC PANEL: CPT

## 2024-09-19 PROCEDURE — 99213 OFFICE O/P EST LOW 20 MIN: CPT | Performed by: STUDENT IN AN ORGANIZED HEALTH CARE EDUCATION/TRAINING PROGRAM

## 2024-09-19 PROCEDURE — 36415 COLL VENOUS BLD VENIPUNCTURE: CPT

## 2024-09-19 PROCEDURE — 69210 REMOVE IMPACTED EAR WAX UNI: CPT | Performed by: STUDENT IN AN ORGANIZED HEALTH CARE EDUCATION/TRAINING PROGRAM

## 2024-09-19 PROCEDURE — 80061 LIPID PANEL: CPT

## 2024-09-19 PROCEDURE — 1159F MED LIST DOCD IN RCRD: CPT | Performed by: STUDENT IN AN ORGANIZED HEALTH CARE EDUCATION/TRAINING PROGRAM

## 2024-09-19 PROCEDURE — 85025 COMPLETE CBC W/AUTO DIFF WBC: CPT

## 2024-09-19 NOTE — PROGRESS NOTES
[facility]  OTOLARYNGOLOGY - HEAD & NECK SURGERY    9/19/2024     Reason for Consultation:   Epistaxis    History of Present Illness:   Patient is a pleasant 78 year old male who is being seen for Epistaxis from the right side over the last year and a half.  He notes that this was always from the right side.  He does not take any significant blood thinners and is only on a baby aspirin daily.  He does note that at times the bleeding started from wiping his nose.  He has not tried any conservative treatment with Vaseline, nasal saline or humidification.  When he does have the nosebleeds they last for few minutes and then stop.  He has not used any Afrin nasal spray.  Of note he does have a history of squamous cell carcinoma of the base of tongue and was treated with both surgery which included a right neck dissection and postoperative chemotherapy and radiation.  This was in 2014 and the patient has been cancer free ever since.     INTERVAL HISTORY  9/19/2024: Patient returns today for follow-up of his epistaxis, and for dysphagia.  He states that he has been having more difficulty eating and has to cut his food into smaller pieces in order to eat.  He is approximately 10 years out following surgical excision of the base of tongue tumor and postoperative chemotherapy and radiation.  He states that initially after treatment he did not have any difficulty eating but has noticed more difficulty in the past few years.  He has not done any speech therapy recently but has done some in the past.  He is also been complaining of left ear fullness.    Past Medical History  Past Medical History:    AAA (abdominal aortic aneurysm) (HCC)    Appendicitis    Arthritis    ASHD (arteriosclerotic heart disease)    LIMA graft to LAD    Cancer (HCC)    Chronic hip pain    s/p spinal cord stimulator    Colon polyps    Congestive heart disease (HCC)    Diabetes insipidus (HCC)    Elevated prostate specific antigen (PSA)    Elevated PSA     Fall    c/b rib fractures    Glaucoma    slight glaucoma in one eye    Hiatal hernia    Hypomagnesemia    Low TSH level    Lung disease    Asbestos related lung disease    Nonspecific abnormal serum enzyme levels    Orthostatic hypotension    Other and unspecified hyperlipidemia    PEG (percutaneous endoscopic gastrostomy) status (HCC)    Pleural plaque    calcified pleural plaques    Pleurisy    Tongue cancer (HCC)    squamous cell carcinoma       Past Surgical History  Past Surgical History:   Procedure Laterality Date    Appendectomy  2009    Back surgery  2020    Cabg  2003    single bypass LIMA graft to LAD    Cataract  04/2019    bilateral    Colonoscopy  2021    Ir repair aaa endovascular  2015    Knee replacement surgery  2021    Knee surgery Right 1972    cartilage removed; replacement 1/2021    Other surgical history  8/2014    Modified Neck Dissection    Other surgical history  8/2014    Excision of Tongue Lesion    Other surgical history      repair abdominal aneurysm    Other surgical history  11/2020    spinal cord stimulator    Tonsillectomy         Family History  Family History   Problem Relation Age of Onset    Cancer Father         bladder cancer    Heart Disease Father         congenital heart disease    Heart Attack Father 65        MI - cause of death    Diabetes Father     Heart Disorder Father     Cancer Sister         lung cancer    Cancer Brother         lung cancer    Heart Disease Brother         CAD    Heart Attack Brother 58        MI    Diabetes Brother     Heart Disorder Brother     Heart Disease Mother         CAD    Heart Surgery Mother 80        CABG    Dementia Mother     Heart Disorder Mother     Heart Disease Other         CAD       Social History  Pediatric History   Patient Parents    Not on file     Other Topics Concern     Service Not Asked    Blood Transfusions Not Asked    Caffeine Concern Yes     Comment: Coffee 1 cup daily    Occupational Exposure Not Asked     Hobby Hazards Not Asked    Sleep Concern Not Asked    Stress Concern Not Asked    Weight Concern Not Asked    Special Diet Not Asked    Back Care Not Asked    Exercise Yes     Comment: 3 days weekly    Bike Helmet Not Asked    Seat Belt Not Asked    Self-Exams Not Asked   Social History Narrative    Not on file           Current Medications:  Current Outpatient Medications   Medication Sig Dispense Refill    carbidopa-levodopa  MG Oral Tab Take 0.5 tablets by mouth 3 (three) times daily. 135 tablet 0    Mirabegron ER 50 MG Oral Tablet 24 Hr Take 1 tablet (50 mg total) by mouth daily.      acetaminophen 500 MG Oral Tab Take 1 tablet (500 mg total) by mouth every 6 (six) hours as needed for Pain.      atorvastatin 40 MG Oral Tab Take 1 tablet (40 mg total) by mouth daily.      Multiple Vitamins-Minerals (ICAPS AREDS FORMULA) Oral Tab Take 1 tablet by mouth daily.      aspirin 81 MG Oral Tab Take 1 tablet (81 mg total) by mouth daily.      Vitamin C (VITAMIN C) 500 MG Oral Tab Take 1 tablet (500 mg total) by mouth daily.      Multiple Vitamins-Minerals (MENS MULTIVITAMIN PLUS) Oral Tab Take 1 tablet by mouth daily.      Calcium Carb-Cholecalciferol 600-200 MG-UNIT Oral Tab Take 1 tablet by mouth 2 (two) times daily.         Allergies  No Known Allergies    Review of Systems:   A comprehensive 10 point review of systems was completed.  Pertinent positives and negatives noted in the the HPI.    Physical Exam:   There were no vitals taken for this visit.    GENERAL: No acute distress, Comfortable appearing  FACE: HB 1/6, Normal Animation  HEAD: Normocephalic  EYES: EOMI, pupils equil  EARS: Bilateral Auricles Symmetric, bilateral cerumen  NOSE: Nares patent bilaterally  ORAL CAVITY: Tongue mobile, Oropharynx clear, Floor of mouth clear, Posterior oropharynx normal  NECK: No palpable lymphadenopathy, thyroid not palpable, nontender    PROCEDURE: BILATERAL RIGID NASAL ENDOSCOPY  Bilateral rigid nasal endoscopy (79369)  was performed. Verbal consent was obtained from the patient to proceed with rigid nasal endoscopy.  A rigid 4mm 30 degree nasal endoscope was used to examine both nasal cavities. The inferior meatus, inferior turbinate, nasopharynx, middle meatus, middle turbinate, superior meatus, superior turbinate, and sphenoethmoidal recess were examined bilaterally and deemed to be normal, with any exceptions as noted below. At the completion of the procedure the endoscope was removed. The patient tolerated the procedure well. There were no complications.    Findings: The bilateral inferior turbinates were normal. The Septum was deviated to the left. The middle meatus was patent bilaterally. There were no obvious masses or polyps noted.  No further notable prominent vessels of the caudal septum    OTOMICROSCOPY WITH CERUMEN REMOVAL    Canals:  Right: Canal with cerumen preventing adequate view of TM, debrided with instrumentation as dictated below  Left: Canal with cerumen preventing adequate view of TM, debrided with instrumentation as dictated below    Tympanic Membranes:  Right: Normal tympanic membrane.   Left: Normal tympanic membrane.     TM Visualized Method:   Right TM examined via otomicroscopy.    Left TM examined via otomicroscopy.      PROCEDURE: REMOVAL OF CERUMEN IMPACTION  The cerumen impaction was completely removed from the bilateral ear canals using microscopy as necessary.   Removal was completed by using a suction    Results:     Laboratory Data:  Lab Results   Component Value Date    WBC 5.1 03/21/2024    HGB 14.6 03/21/2024    HCT 43.6 03/21/2024    .0 03/21/2024    CREATSERUM 1.03 03/21/2024    BUN 22 03/21/2024     03/21/2024    K 4.4 03/21/2024     03/21/2024    CO2 30.0 03/21/2024    GLU 94 03/21/2024    CA 10.2 03/21/2024    ALB 4.5 03/21/2024    ALKPHO 119 (H) 03/21/2024    TP 7.4 03/21/2024    AST 23 03/21/2024    ALT 15 03/21/2024    T4F 1.43 04/10/2015    TSH 1.700 03/21/2024     GGT 4 (L) 09/10/2019    MG 1.6 (L) 12/15/2014    PHOS 3.0 09/19/2022    CK 66 02/27/2020    B12 666 03/21/2024         Imaging:  No results found.      Impression:   Epistaxis, right  Deviated nasal septum  Rhinorrhea  Chronic rhinitis    Recommendations:  The patient has had much less frequent bleeding from the nose.  We will continue with conservative therapy.  In terms of his swallowing I would like to obtain esophagram and I would like him to see speech therapy.  In terms of his left ear fullness I did completely disimpact the bilateral ear canals and he feels much improved.    Thank you for allowing me to participate in the care of your patient.    Alverto Lofton, DO   Otolaryngology/Rhinology, Sinus, and Endoscopic Skull Base Surgery  [facility] [address] [address]  Phone [phone]  Fax [phone]  12/6/2023  10:58 AM  9/19/2024

## 2024-09-23 ENCOUNTER — HOSPITAL ENCOUNTER (OUTPATIENT)
Dept: GENERAL RADIOLOGY | Facility: HOSPITAL | Age: 79
Discharge: HOME | End: 2024-09-23
Attending: STUDENT IN AN ORGANIZED HEALTH CARE EDUCATION/TRAINING PROGRAM
Payer: MEDICARE

## 2024-09-23 ENCOUNTER — OFFICE VISIT (OUTPATIENT)
Dept: INTERNAL MEDICINE CLINIC | Facility: CLINIC | Age: 79
End: 2024-09-23

## 2024-09-23 VITALS
OXYGEN SATURATION: 95 % | DIASTOLIC BLOOD PRESSURE: 60 MMHG | TEMPERATURE: 98 F | BODY MASS INDEX: 23.19 KG/M2 | WEIGHT: 153 LBS | HEART RATE: 68 BPM | HEIGHT: 68 IN | SYSTOLIC BLOOD PRESSURE: 110 MMHG

## 2024-09-23 DIAGNOSIS — C01 PRIMARY SQUAMOUS CELL CARCINOMA OF BASE OF TONGUE (HCC): ICD-10-CM

## 2024-09-23 DIAGNOSIS — Z00.00 ROUTINE HEALTH MAINTENANCE: ICD-10-CM

## 2024-09-23 DIAGNOSIS — R13.12 OROPHARYNGEAL DYSPHAGIA: ICD-10-CM

## 2024-09-23 DIAGNOSIS — I71.40 ABDOMINAL AORTIC ANEURYSM (AAA) WITHOUT RUPTURE, UNSPECIFIED PART (HCC): ICD-10-CM

## 2024-09-23 DIAGNOSIS — E78.5 HYPERLIPIDEMIA, UNSPECIFIED HYPERLIPIDEMIA TYPE: Primary | ICD-10-CM

## 2024-09-23 DIAGNOSIS — D47.2 MGUS (MONOCLONAL GAMMOPATHY OF UNKNOWN SIGNIFICANCE): ICD-10-CM

## 2024-09-23 DIAGNOSIS — Z85.810 HISTORY OF TONGUE CANCER: ICD-10-CM

## 2024-09-23 DIAGNOSIS — Z86.010 HISTORY OF ADENOMATOUS POLYP OF COLON: ICD-10-CM

## 2024-09-23 DIAGNOSIS — Z96.89 S/P INSERTION OF SPINAL CORD STIMULATOR: ICD-10-CM

## 2024-09-23 PROCEDURE — 74240 X-RAY XM UPR GI TRC 1CNTRST: CPT | Performed by: STUDENT IN AN ORGANIZED HEALTH CARE EDUCATION/TRAINING PROGRAM

## 2024-09-23 PROCEDURE — 74246 X-RAY XM UPR GI TRC 2CNTRST: CPT | Performed by: STUDENT IN AN ORGANIZED HEALTH CARE EDUCATION/TRAINING PROGRAM

## 2024-09-23 NOTE — PROGRESS NOTES
Baron Cleaning is a 78 year old male.  HPI:     Chief Complaint   Patient presents with    Checkup      Baron presents with his wife Ayesha    He had a esophagram today and I did give them the report.  I did And paste the impression below for reference.  This was ordered by Dr. Lofton.      CONCLUSION:   1. Intermittent laryngeal penetration without aspiration during swallowing evaluation.  Recommend video fluoroscopic swallowing evaluation   2. No evidence for esophageal stricture , intrinsic lesions or obstruction.   3. No gross intrinsic gastric lesions.  No gastric outlet obstruction.  Incomplete gastric distention limits evaluation of the gastric mucosa..  Normal appearing duodenum.            Dictated by (CST): Be Garcia MD on 9/23/2024 at 12:43 PM       Finalized by (CST): Be Garcia MD on 9/23/2024 at 12:50 PM     Also I gave order for speech therapy and a video swallow.  He has felt to have dysphagia.    I did read 's consult note.  He does have dysphagia.  This is mostly with solids.  Not with liquids.  Per ENT evaluation the thought was that he had epistaxis on the right with much less bleeding from the nose.  Deviated septum.  Rhinorrhea.  Chronic rhinitis.  In addition there was noted difficulty eating and he has to cut his food into smaller pieces in order to eat.  He has noticed more difficulty in the past few years.    I did review recent labs.  They seem to be acceptable.  Alkaline phosphatase mildly elevated 127 but has had this sporadically throughout the years.  Cholesterol 118.  HDL 50.  Triglycerides 58 and LDL 55    He did have updated abdominal aortic ultrasound for his history of abdominal aortic aneurysm.  This was May 29, 2024.  Result was satisfactory.  No endoleak.  There was noted multiple calcifications seen within the liver suggestive of remote granulomatous disease.  Liver was otherwise unremarkable.  Biliary system was unremarkable.  Multiple  splenic calcifications suggestive of remote granulomatous disease.  Kidneys enhance symmetrically.  There were a few small subcentimeter hypodense lesions in the kidneys bilaterally which are small to characterize but statistically likely representing cysts.  Bladder wall thickening greater than what is expected for underdistention.  Likely due to chronic bladder outlet obstruction from enlarged prostate.  No mass or fluid collection.  DJD changes.    Next abdominal aortic evaluation with CTA 2 years per .  That would be May 2026    We did talk about the availability of vaccines such as flu vaccine COVID booster RSV and Shingrix.  For now Baron wishes not to pursue any vaccines    His last colonoscopy was July 2021 with Dr. Rainey.  Next colonoscopy July 2026    He will see Dr. Domingo Boateng March of next year.  He has a history of CABG.    He has seen neurology.  Dr. Bejarano.  It is felt that he has a mild parkinsonism on exam.  Also length-dependent peripheral neuropathy.  Sinemet was ordered.  He just started it last week.  He has a follow-up with Dr. Bejarano in December  Current Outpatient Medications   Medication Sig Dispense Refill    carbidopa-levodopa  MG Oral Tab Take 0.5 tablets by mouth 3 (three) times daily. 135 tablet 0    acetaminophen 500 MG Oral Tab Take 1 tablet (500 mg total) by mouth every 6 (six) hours as needed for Pain.      atorvastatin 40 MG Oral Tab Take 1 tablet (40 mg total) by mouth daily.      Multiple Vitamins-Minerals (ICAPS AREDS FORMULA) Oral Tab Take 1 tablet by mouth daily.      aspirin 81 MG Oral Tab Take 1 tablet (81 mg total) by mouth daily.      Vitamin C (VITAMIN C) 500 MG Oral Tab Take 1 tablet (500 mg total) by mouth daily.      Multiple Vitamins-Minerals (MENS MULTIVITAMIN PLUS) Oral Tab Take 1 tablet by mouth daily.      Calcium Carb-Cholecalciferol 600-200 MG-UNIT Oral Tab Take 1 tablet by mouth 2 (two) times daily.      Mirabegron ER 50 MG Oral Tablet 24 Hr Take 1  tablet (50 mg total) by mouth daily. (Patient not taking: Reported on 2024)        Past Medical History:    AAA (abdominal aortic aneurysm) (HCC)    Appendicitis    Arthritis    ASHD (arteriosclerotic heart disease)    LIMA graft to LAD    Cancer (HCC)    Chronic hip pain    s/p spinal cord stimulator    Colon polyps    Congestive heart disease (HCC)    Diabetes insipidus (HCC)    Elevated prostate specific antigen (PSA)    Elevated PSA    Fall    c/b rib fractures    Glaucoma    slight glaucoma in one eye    Hiatal hernia    Hypomagnesemia    Low TSH level    Lung disease    Asbestos related lung disease    Nonspecific abnormal serum enzyme levels    Orthostatic hypotension    Other and unspecified hyperlipidemia    PEG (percutaneous endoscopic gastrostomy) status (HCC)    Pleural plaque    calcified pleural plaques    Pleurisy    Tongue cancer (HCC)    squamous cell carcinoma      Social History:  Social History     Socioeconomic History    Marital status:    Tobacco Use    Smoking status: Former     Current packs/day: 0.00     Average packs/day: 0.8 packs/day for 25.0 years (18.8 ttl pk-yrs)     Types: Cigarettes     Start date: 6/15/1978     Quit date: 6/15/2003     Years since quittin.2     Passive exposure: Past    Smokeless tobacco: Never   Vaping Use    Vaping status: Never Used   Substance and Sexual Activity    Alcohol use: No    Drug use: No   Other Topics Concern    Caffeine Concern Yes     Comment: Coffee 1 cup daily    Exercise Yes     Comment: 3 days weekly     Social Determinants of Health      [facility]    Social Connections    Received from [facility], [facility]    Housing Stability        REVIEW OF SYSTEMS:   GENERAL HEALTH:  feels well otherwise  RESPIRATORY:  Voices no shortness of breath with exertion or cough  CARDIOVASCULAR:  Voices no chest pain on exertion or  shortness of breath  GI:   Voices no abdominal pain or changes of bowels   :Viices no urning or frequency of urination.  NEURO:  Voices no  headaches or dizziness    EXAM:   /60 (BP Location: Right arm, Patient Position: Sitting, Cuff Size: adult)   Pulse 68   Temp 98.2 °F (36.8 °C) (Oral)   Ht 5' 8\" (1.727 m)   Wt 153 lb (69.4 kg)   SpO2 95%   BMI 23.26 kg/m²     GENERAL:  well developed, well nourished, in no apparent distress  SKIN:  no rashes ,  HEENT: atraumatic.   Pharynx normal without exudate.  EYES:  PERRL. Sclera anicteric.  NECK:  Supple,  no adenopathy,   LUNGS:  clear to auscultation.  Effort normal  CARDIO:  RRR without murmur.   S1 and S2 normal  GI:  good BS's,  no masses,   HSM or tenderness  EXTREMITIES : no cyanosis, clubbing or edema    ASSESSMENT AND PLAN:     1. Hyperlipidemia, unspecified hyperlipidemia type  Hyperlipidemia.  On statin.  Lipids at goal    2. MGUS (monoclonal gammopathy of unknown significance)  MGUS.  It was felt that he has no monoclonal protein.  Therefore no follow-up needed per Dr. Dorsey    3. Abdominal aortic aneurysm (AAA) without rupture, unspecified part (HCC)  Stable.  Up-to-date with his images    4. History of tongue cancer  Stable.  No evidence of recurrence.    5. History of adenomatous polyp of colon  Stable.  Up-to-date with his colonoscopy.  Next colonoscopy July 2026    6. S/P insertion of spinal cord stimulator  Stable.    7. Routine health maintenance  See above for vaccine discussion    8. Oropharyngeal dysphagia  He has seen Dr. Lofton.  Physical therapy for speech was ordered.  I did give video swallow ordered.  He did have esophagram.    This visit was 30 minutes.  I spent 10 minutes before visit preparing and reviewing old records.  Greater than 50% of the visit was engaged in counseling and review of past data.    Follow-up in early January.    The patient indicates understanding of these issues and agrees to the plan.    Be Alvarado,  MD  9/23/2024  1:26 PM

## 2024-09-24 ENCOUNTER — TELEPHONE (OUTPATIENT)
Dept: INTERNAL MEDICINE CLINIC | Facility: CLINIC | Age: 79
End: 2024-09-24

## 2024-09-24 DIAGNOSIS — R13.12 OROPHARYNGEAL DYSPHAGIA: Primary | ICD-10-CM

## 2024-09-24 NOTE — TELEPHONE ENCOUNTER
Patient's spouse Ayesha called, she is trying to schedule the swallowing test Dr. Alvarado ordered yesterday but out patient scheduling does not see the order in patients chart.  Per Ayesha, she has a copy of the order in her hand.      Please advise

## 2024-10-02 ENCOUNTER — PREP FOR CASE (OUTPATIENT)
Dept: UROLOGY | Age: 79
End: 2024-10-02

## 2024-10-02 ENCOUNTER — APPOINTMENT (OUTPATIENT)
Dept: UROLOGY | Age: 79
End: 2024-10-02

## 2024-10-02 ENCOUNTER — HOSPITAL ENCOUNTER (OUTPATIENT)
Age: 79
End: 2024-10-02
Attending: UROLOGY | Admitting: UROLOGY

## 2024-10-02 VITALS
OXYGEN SATURATION: 98 % | HEART RATE: 55 BPM | BODY MASS INDEX: 23.57 KG/M2 | WEIGHT: 155 LBS | RESPIRATION RATE: 18 BRPM | DIASTOLIC BLOOD PRESSURE: 81 MMHG | SYSTOLIC BLOOD PRESSURE: 149 MMHG | TEMPERATURE: 97.5 F

## 2024-10-02 DIAGNOSIS — Z01.818 PREOP EXAMINATION: Primary | ICD-10-CM

## 2024-10-02 DIAGNOSIS — N32.81 OVERACTIVE BLADDER: Primary | ICD-10-CM

## 2024-10-02 DIAGNOSIS — Z01.810 PRE-OPERATIVE CARDIOVASCULAR EXAMINATION: ICD-10-CM

## 2024-10-02 DIAGNOSIS — Z01.818 PREOP TESTING: ICD-10-CM

## 2024-10-02 LAB — BLDR SCAN MLS: NORMAL

## 2024-10-09 VITALS — BODY MASS INDEX: 22.88 KG/M2 | HEIGHT: 68 IN | WEIGHT: 151 LBS

## 2024-10-09 SDOH — SOCIAL STABILITY: SOCIAL INSECURITY: HOW OFTEN DOES ANYONE, INCLUDING FAMILY AND FRIENDS, SCREAM OR CURSE AT YOU?: NEVER

## 2024-10-09 SDOH — SOCIAL STABILITY: SOCIAL INSECURITY: HOW OFTEN DOES ANYONE, INCLUDING FAMILY AND FRIENDS, THREATEN YOU WITH HARM?: NEVER

## 2024-10-09 SDOH — SOCIAL STABILITY: SOCIAL INSECURITY: HOW OFTEN DOES ANYONE, INCLUDING FAMILY AND FRIENDS, PHYSICALLY HURT YOU?: NEVER

## 2024-10-09 SDOH — SOCIAL STABILITY: SOCIAL INSECURITY: HOW OFTEN DOES ANYONE, INCLUDING FAMILY AND FRIENDS, INSULT OR TALK DOWN TO YOU?: NEVER

## 2024-10-09 ASSESSMENT — ACTIVITIES OF DAILY LIVING (ADL)
HISTORY OF FALLING IN THE LAST YEAR (PRIOR TO ADMISSION): NO
SENSORY_SUPPORT_DEVICES: DENTURES
ADL_SHORT_OF_BREATH: NO
ADL_BEFORE_ADMISSION: INDEPENDENT
ADL_SCORE: 12

## 2024-10-10 ENCOUNTER — HOSPITAL ENCOUNTER (OUTPATIENT)
Dept: GENERAL RADIOLOGY | Facility: HOSPITAL | Age: 79
Discharge: HOME | End: 2024-10-10
Attending: INTERNAL MEDICINE
Payer: MEDICARE

## 2024-10-10 DIAGNOSIS — R13.12 OROPHARYNGEAL DYSPHAGIA: ICD-10-CM

## 2024-10-10 PROCEDURE — 92611 MOTION FLUOROSCOPY/SWALLOW: CPT

## 2024-10-10 PROCEDURE — 74230 X-RAY XM SWLNG FUNCJ C+: CPT | Performed by: INTERNAL MEDICINE

## 2024-10-10 NOTE — PROGRESS NOTES
ADULT VIDEOFLUOROSCOPIC SWALLOWING STUDY       ADULT VIDEOFLUOROSCOPIC SWALLOWING STUDY:   Referring Physician: Christiano      Radiologist: Dr. Briones  Diagnosis: dysphagia    Date of Service: 10/10/2024     PATIENT SUMMARY   Chief Complaint: Pt c/o pills and food sticking in his throat which makes him cough and gag and he brings them back up.  An esophagram revealed laryngeal penetration without aspiration, otherwise unremarkable.  The patient has a history of base of tongue cancer treated with surgery and chemoRT in 2014.  He has a recent diagnosis of Parkinson's disease.  Pt has lost 3-4 pounds in the past couple of months.  His weight fluctuates and he has difficulty maintaining his weight.  He denies odynophagia and shortness of breath.  He has had no recent CXR.  Current Diet: regular foods and liquids       Problem List  Active Problems:  Active Problems:    * No active hospital problems. *      Past Medical History  Past Medical History:    AAA (abdominal aortic aneurysm) (HCC)    Appendicitis    Arthritis    ASHD (arteriosclerotic heart disease)    LIMA graft to LAD    Cancer (HCC)    Chronic hip pain    s/p spinal cord stimulator    Colon polyps    Congestive heart disease (HCC)    Diabetes insipidus (HCC)    Elevated prostate specific antigen (PSA)    Elevated PSA    Fall    c/b rib fractures    Glaucoma    slight glaucoma in one eye    Hiatal hernia    Hypomagnesemia    Low TSH level    Lung disease    Asbestos related lung disease    Nonspecific abnormal serum enzyme levels    Orthostatic hypotension    Other and unspecified hyperlipidemia    PEG (percutaneous endoscopic gastrostomy) status (HCC)    Pleural plaque    calcified pleural plaques    Pleurisy    Tongue cancer (HCC)    squamous cell carcinoma        Imaging results: 9/23/24 esophagram:  CONCLUSION:   1. Intermittent laryngeal penetration without aspiration during swallowing evaluation.  Recommend video fluoroscopic swallowing evaluation   2.  No evidence for esophageal stricture , intrinsic lesions or obstruction.   3. No gross intrinsic gastric lesions.  No gastric outlet obstruction.  Incomplete gastric distention limits evaluation of the gastric mucosa..  Normal appearing duodenum.         ASSESSMENT   DYSPHAGIA ASSESSMENT  Test completed in conjunction with Radiologist.   Food/Liquid Types Presented: puree, solid, nectar thick liquid, and thin liquids.    Study Position and View:  Patient was seated upright and viewed laterally and A-P.    Pain Assessment: The patient reports pain at a level of 0/10.    Oral phase:  Bilabial seal was intact with no anterior food and liquid loss.  Mild-moderately increased mastication and oral transit times.  Bolus control and containment was adequate.  Mild oral residue remained intermittently.    Pharyngeal phase:  The pharyngeal response triggered at the tongue base for liquids and at the tongue base to valleculae for puree and solids.  Delayed and incomplete epiglottic inversion and laryngeal squeeze resulted in deep laryngeal penetration to the vocal cords with mildly thick liquids and silent aspiration with thin liquids during the swallow.  Shallow laryngeal penetration of puree was observed after the swallow on retention remaining in the pharynx.  Silent aspiration of mildly thick liquids was also observed after the swallow on pharyngeal residue.  With cues to hold his breath, swallow cough and swallow again, penetration reduced in frequency with mildly thick liquids.  A chin tuck did not significantly affect the swallow or reduce or eliminate penetration and aspiration.  Reduced range of motion and rate of base of tongue retraction and hyolaryngeal excursion resulted in moderate vallecular retention primarily on the right and minimal pyriform sinus retention.  Multiple swallows, 4-5 per bolus, reduced the retention to mild-moderate amounts.  Retention typically spilled over from the valleculae into the  pyriform sinuses, placing patient as risk for airway invasion.  A second swallow with head turn to the left reduced the retention more than head turn right.      Esophageal phase:   Adequate flow of bolus through upper esophagus     Penetration Aspiration Scale: 8/8.  Material entered the airway, passed below the vocal cords, and no effort was made to eject.    Overall Impression: Moderate-severe pharyngeal dysphagia was characterized by laryngeal penetration of puree and silent aspiration of mildly thick and thin liquids during and after the swallow.  With cues to hold breath before swallowing and cough after swallow, aspiration was avoided with puree and frequency of aspiration with mildly thick liquids was significantly less. Moderate vallecular and mild pyriform sinus retention remained primarily on the right.  Dysphagia is likely related to a combination of late effects of radiation and diagnosis of Parkinson's disease.  See recommendations below for diet, compensatory strategies and intensive dysphagia therapy.  Pt would also benefit from CXR given silent aspiration.      FCM category and level: Swallowing, 3  PLAN   Potential: Good    Diet Recommendations:  Solids: Soft bite sized with extra moisture, IDDSI 6  Liquids: Mildly thick liquids, IDDSI 2  Pt may have plain water after cleaning mouth thoroughly.  Use strategies!    Recommended compensatory strategies:   Sit upright  Small, single sips  Alternate liquids and solids  Hold breath, Swallow, Cough, swallow, cough and swallow again with every bite and sip  No straw    Medication Administration:  Crush meds & add to pureed  Take whole in pureed    Further Follow-up:  Recommend intensive dysphagia therapy.  Follow up with ENT and PCP to determine next steps.    GOALS (to be met 10 visits)  The patient will tolerate soft foods with extra moisture diet consistency and mildly thick liquids and trials of plain water without overt signs or symptoms of aspiration  with 100 % accuracy  The patient/family/caregiver will demonstrate understanding and implementation of aspiration precautions and swallow strategies independently   Patient will reduce risk of aspiration by completing dysphagia exercises to 90% accuracy     EDUCATION/INSTRUCTION  Reviewed results and recommendations with patient and family.  Written instructions were provided.  Agreement/Understanding verbalized and all questions answered to their apparent satisfaction.      INTERDISCIPLINARY COMMUNICATION  Reviewed results with Radiologist; agreement verbalized.        Patient/Family was advised of these findings, precautions, recommendations and treatment options and has agreed to actively participate in planning and for this course of care.    Thank you for your referral. Please co-sign or sign and return this letter via fax as soon as possible. If you have any questions, please contact me at [phone].    Odalys Ortez MA/THOM-SLP  Speech Language Pathologist  [facility]  [phone]    Electronically signed by therapist: LOYD Fields  Physician's certification required:   Yes  I certify the need for these services furnished under this plan of treatment and while under my care.    X___________________________________________________ Date____________________    Certification From: 10/10/2024  To:1/8/2025

## 2024-10-10 NOTE — PATIENT INSTRUCTIONS
VIDEO SWALLOW STUDY    Diet Recommendations:  Solids: Soft bite sized with extra moisture, IDDSI 6  Liquids: Mildly thick liquids, IDDSI 2  Pt may have plain water after cleaning mouth thoroughly.  Use strategies!    Recommended compensatory strategies:   Sit upright  Small, single sips  Alternate liquids and solids  Hold breath, Swallow, Cough, swallow, cough and swallow again with every bite and sip  No straw    Medication Administration:  Crush meds & add to pureed  Take whole in pureed    Further Follow-up:  Recommend intensive dysphagia therapy.  Follow up with ENT and PCP to determine next steps.  CXR is recommended.  Order [id number] device if able.    Odalys Ortez MA/THOM-SLP  Speech Language Pathologist  [facility]  [phone]          LIQUID CONSISTENCIES    Liquids are generally classified into three groups: honey, nectar and thin consistency liquids.  Thin liquids can be the most difficult consistency for people with dysphagia or swallowing problems to control, especially if there is muscle weakness and/or paralysis in the mouth or throat.   Thickening these liquids can assist with control and optimize the intake of fluids for hydration.    Below is a list of liquids of different consistencies to use as a guideline in choosing appropriate dinks.  This list is not inclusive and all of the liquids listed can be altered to achieve the appropriate consistency by using a commercial thickener.    Honey Consistency Liquids:  any liquid that is the same thickness as honey, yogurt, custard, heavy gravy, pudding and blenderized cream soups such as cream of potato are often appropriate.  Any liquid in the nectar and thin consistency group can be altered to honey consistency using a commercial thickener.    Nectar Thick Liquids:  Apricot nectar, tomato juice, vegetable juice, buttermilk, eggnog, and most 1.5-2 leyla/ml oral supplements such as Boost plus and Ensure plus.  Ask your speech pathologist  if you may have milkshakes, yogurt shakes, ice cream, malts and gelatin.  Use a commercial thickener with thin liquids to achieve the appropriate thickness.    Thin Liquids:  Ice chips, water, coffee, tea, milk, juice, lemonade, soda, hot chocolate, alcohol, fruit ices, broth, clear liquid soups, and most 1kcal/ml oral supplements such as Boost or Ensure.    Some thick liquids may melt into thin liquids in the mouth or when allowed to stand at room temperature.  It may be necessary to stabilize these liquids with a commercial thickener.         Powdered Thickeners and Pre-Thickened Products:     1. Resource Thicken Up Clear       : [employer]   www.thickenupcle[employer]BUZZ   1-852.604.6683    2. Thick and Easy Instant Food Thickener   Thick and Easy pre-mixed beverages   : [employer]   www.[employer]   1-933.260.9679    3. Thick It/ Thick It 2   : [employer]   www.[employer]   1-689.429.2058    Most pharmacies, especially [facility] and [facility] should have one brand of thickener in stock.  [employer] and [employer] have free shipping for all of their products and will ship the same day.

## 2024-10-11 ENCOUNTER — TELEPHONE (OUTPATIENT)
Dept: INTERNAL MEDICINE CLINIC | Facility: CLINIC | Age: 79
End: 2024-10-11

## 2024-10-11 ENCOUNTER — TELEPHONE (OUTPATIENT)
Dept: PHYSICAL THERAPY | Facility: HOSPITAL | Age: 79
End: 2024-10-11

## 2024-10-11 DIAGNOSIS — R13.12 OROPHARYNGEAL DYSPHAGIA: ICD-10-CM

## 2024-10-11 DIAGNOSIS — T17.908A ASPIRATION INTO AIRWAY, INITIAL ENCOUNTER: Primary | ICD-10-CM

## 2024-10-11 NOTE — TELEPHONE ENCOUNTER
Santiago Morrow.  Thank you so much for taking care of Baron.  I see that Ayesha and the spouse of Baron send you a [employer] message regarding video swallow result.    Just an FYI I did send them through [employer] the video swallow radiological study.    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    Does he have any more visits with you going forward?

## 2024-10-11 NOTE — TELEPHONE ENCOUNTER
Hi Dr. Alvarado,    I saw the results and I think really the best treatment for him would be speech and swallow therapy and managing consistency of food. Certain swallowing techniques may also help. Unfortunately I think this is a sequela of his prior radiation therapy to the neck as well as potentially his parkinson's.     Best Regards,    Alverto Lofton D.O.

## 2024-10-11 NOTE — TELEPHONE ENCOUNTER
Hi .  Thank you for having seen Baron in the past and your consultation.  He did have his video swallow evaluation which showed laryngeal penetration with silent aspiration into the upper trachea.  Retention of contrast in the vallecula.  Also evaluation was done by Odalys Ortez speech therapist.  Her report is available for your review.  Please let me know if there is anything from your perspective to do for Yash's swallowing problem.  Thank you very much.  David.

## 2024-10-12 ENCOUNTER — ANCILLARY PROCEDURE (OUTPATIENT)
Dept: LAB | Age: 79
End: 2024-10-12
Attending: ANESTHESIOLOGY

## 2024-10-12 ENCOUNTER — APPOINTMENT (OUTPATIENT)
Dept: LAB | Age: 79
End: 2024-10-12
Attending: ANESTHESIOLOGY

## 2024-10-12 DIAGNOSIS — Z01.818 PREOP TESTING: ICD-10-CM

## 2024-10-12 DIAGNOSIS — Z01.810 PRE-OPERATIVE CARDIOVASCULAR EXAMINATION: ICD-10-CM

## 2024-10-12 LAB
APPEARANCE UR: ABNORMAL
ATRIAL RATE (BPM): 62
BACTERIA #/AREA URNS HPF: ABNORMAL /HPF
BILIRUB UR QL STRIP: NEGATIVE
COLOR UR: YELLOW
GLUCOSE UR STRIP-MCNC: NEGATIVE MG/DL
HGB UR QL STRIP: ABNORMAL
HYALINE CASTS #/AREA URNS LPF: ABNORMAL /LPF
KETONES UR STRIP-MCNC: NEGATIVE MG/DL
LEUKOCYTE ESTERASE UR QL STRIP: ABNORMAL
MUCOUS THREADS URNS QL MICRO: PRESENT
NITRITE UR QL STRIP: NEGATIVE
P AXIS (DEGREES): 6
PH UR STRIP: 6 [PH] (ref 5–7)
PR-INTERVAL (MSEC): 144
PROT UR STRIP-MCNC: ABNORMAL MG/DL
QRS-INTERVAL (MSEC): 86
QT-INTERVAL (MSEC): 420
QTC: 426
R AXIS (DEGREES): 18
RBC #/AREA URNS HPF: ABNORMAL /HPF
REPORT TEXT: NORMAL
SP GR UR STRIP: 1.02 (ref 1–1.03)
SQUAMOUS #/AREA URNS HPF: ABNORMAL /HPF
T AXIS (DEGREES): 50
UROBILINOGEN UR STRIP-MCNC: 0.2 MG/DL
VENTRICULAR RATE EKG/MIN (BPM): 62
WBC #/AREA URNS HPF: >100 /HPF

## 2024-10-12 PROCEDURE — 81001 URINALYSIS AUTO W/SCOPE: CPT

## 2024-10-12 PROCEDURE — 87186 SC STD MICRODIL/AGAR DIL: CPT

## 2024-10-12 PROCEDURE — 93005 ELECTROCARDIOGRAM TRACING: CPT

## 2024-10-12 PROCEDURE — 93010 ELECTROCARDIOGRAM REPORT: CPT | Performed by: INTERNAL MEDICINE

## 2024-10-13 NOTE — TELEPHONE ENCOUNTER
Santiago Morrow.  This is just a follow-up question. Ayesha the wife of Baron was looking for the speech therapy \"report\"    I am not sure where I would find this in [employer].  Would you be able to directly what section I would look for the report under.  Thank you.  David.

## 2024-10-13 NOTE — TELEPHONE ENCOUNTER
Santiago Morrow.  I found your notes!  Under \"hospital notes\".  Thank you.  I will forward them onto the patient's wife Ayesha for her review as she was asking for them. David

## 2024-10-14 LAB
ATRIAL RATE (BPM): 62
BACTERIA UR CULT: ABNORMAL
P AXIS (DEGREES): 6
PR-INTERVAL (MSEC): 144
QRS-INTERVAL (MSEC): 86
QT-INTERVAL (MSEC): 420
QTC: 426
R AXIS (DEGREES): 18
REPORT TEXT: NORMAL
T AXIS (DEGREES): 50
VENTRICULAR RATE EKG/MIN (BPM): 62

## 2024-10-15 ENCOUNTER — TELEPHONE (OUTPATIENT)
Dept: INTERNAL MEDICINE CLINIC | Facility: CLINIC | Age: 79
End: 2024-10-15

## 2024-10-15 ENCOUNTER — HOSPITAL ENCOUNTER (OUTPATIENT)
Dept: GENERAL RADIOLOGY | Age: 79
Discharge: HOME | End: 2024-10-15
Attending: INTERNAL MEDICINE
Payer: MEDICARE

## 2024-10-15 ENCOUNTER — TELEPHONE (OUTPATIENT)
Dept: UROLOGY | Age: 79
End: 2024-10-15

## 2024-10-15 ENCOUNTER — TELEPHONE (OUTPATIENT)
Dept: SPEECH THERAPY | Facility: HOSPITAL | Age: 79
End: 2024-10-15

## 2024-10-15 DIAGNOSIS — R13.12 OROPHARYNGEAL DYSPHAGIA: ICD-10-CM

## 2024-10-15 DIAGNOSIS — T17.908A ASPIRATION INTO AIRWAY, INITIAL ENCOUNTER: ICD-10-CM

## 2024-10-15 PROCEDURE — 71046 X-RAY EXAM CHEST 2 VIEWS: CPT | Performed by: INTERNAL MEDICINE

## 2024-10-15 NOTE — TELEPHONE ENCOUNTER
Returned wife's call.  She was unable to see the results of the VFSS in [facility].  Not sure why.  Dr. Alvarado forwarded the results to her.  He will be having his first dysphagia therapy session on Thursday.  BV

## 2024-10-16 ENCOUNTER — TELEPHONE (OUTPATIENT)
Dept: PHYSICAL THERAPY | Facility: HOSPITAL | Age: 79
End: 2024-10-16

## 2024-10-17 ENCOUNTER — OFFICE VISIT (OUTPATIENT)
Dept: SPEECH THERAPY | Facility: HOSPITAL | Age: 79
End: 2024-10-17
Attending: INTERNAL MEDICINE
Payer: MEDICARE

## 2024-10-17 DIAGNOSIS — C01 PRIMARY SQUAMOUS CELL CARCINOMA OF BASE OF TONGUE (HCC): Primary | ICD-10-CM

## 2024-10-17 PROCEDURE — 92526 ORAL FUNCTION THERAPY: CPT

## 2024-10-17 NOTE — PROGRESS NOTES
Diagnosis:   Oropharyngeal Dysphagia R13.12      Referring Provider: Alverto Lofton  Date of Evaluation:    VFSS: 10/10/2024    Precautions:  Aspiration Next MD visit:   none scheduled  Date of Surgery: n/a   Insurance Primary/Secondary: [employer] MCR / N/A     # Auth Visits: Contact Insurance Team for auth            Subjective: No changes reported since VFSS completed.      Pain: 0/10      Objective:   Date: 10/17/2024  TX#: 2 Date:                 TX#: 3 Date:                 TX#: 4 Date:                 TX#: 5 Date:   Tx#: 6 Date:   Tx#: 7   Eating Assessment Tool (EAT-10): Score=22  Score of 3+ is indicative of swallowing problem; score of 15+ indicates increased probability and risk of aspiration        VFSS results/recommendations review        Safe Swallowing Sequence:  Practiced x 2 with water   Hold Breath  Swallow  Cough  Swallow  Cough  Swallow Again        Expiratory Muscle Strength Training Device (EMST-150):  Calibration: 30 cm H2O  Blue Standard Mouthpiece  Completed x 5 repetitions/1 set          Assessment: Pt's wife, Elizabeth, attended today's treatment session.  VFSS results were reviewed with Pt and his wife via handout and explanation.  All questions were answered.  Reviewed recommended compensatory strategies with rationale.  Provided diagram handout of oral and pharyngeal phases of swallowing for additional education and training.  Discussed general swallowing physiology compared to Pt's swallowing deficits. Pt completed the Eating Assessment Tool (EAT-10) and self scored a 22 on this QOL tool for swallowing.  A score of 3+ is indicative of increased difficulty and a score of 15+ indicates higher risk of aspiration.  Practiced safe swallowing sequence with small sips of water this session (hold breath, swallow, cough, swallow, cough, swallow again).  In addition, Expiratory Muscle Strength Training Device (EMST-150) was calibrated at 30 cm H2O using the blue, standard mouthpiece.  Provided  handouts with instructions, data tracker/home exercise program protocol, and cleaning instructions for device.  Understanding verbalized.    Pt and Pt's spouse report Pt was diagnosed with PD in September 2024.  Initial symptoms were loss of balance, difficulty getting dressed, and left hand movement tremors.  Pt reports reduced volume, increased saliva and drooling, word retrieval difficulty and poor thought organization.  Pt may benefit from additional assessment and training related to voice and cognitive-communication skills in future sessions.               Per VFSS, Overall Impression: Moderate-severe pharyngeal dysphagia was characterized by laryngeal penetration of puree and silent aspiration of mildly thick and thin liquids during and after the swallow.  With cues to hold breath before swallowing and cough after swallow, aspiration was avoided with puree and frequency of aspiration with mildly thick liquids was significantly less. Moderate vallecular and mild pyriform sinus retention remained primarily on the right.  Dysphagia is likely related to a combination of late effects of radiation and diagnosis of Parkinson's disease.  See recommendations below for diet, compensatory strategies and intensive dysphagia therapy.  Pt would also benefit from CXR given silent aspiration.     Goals:   (to be met 10 visits)  The patient will tolerate soft foods with extra moisture diet consistency and mildly thick liquids and trials of plain water without overt signs or symptoms of aspiration with 100 % accuracy  The patient/family/caregiver will demonstrate understanding and implementation of aspiration precautions and swallow strategies independently   Patient will reduce risk of aspiration by completing dysphagia exercises to 90% accuracy   Pt will participate in additional assessments related to voice and cognitive-communication skills to address additional reported deficits associated with PD.        Diet  Recommendations:  Solids: Soft bite sized with extra moisture, IDDSI 6  Liquids: Mildly thick liquids, IDDSI 2  Pt may have plain water after cleaning mouth thoroughly.  Use strategies!     Recommended compensatory strategies:   Sit upright  Small, single sips  Alternate liquids and solids  Hold breath, Swallow, Cough, swallow, cough and swallow again with every bite and sip  No straw     Medication Administration:  Crush meds & add to pureed  Take whole in pureed    Plan:   Recommend intensive dysphagia therapy.  Follow up with ENT and PCP to determine next steps.  Recommend continue therapy per current POC established as well as possible additional assessments and treatment related to recent diagnosis of Parkinson's Disease in September 2024 to include voice and cognitive-communication assessments.    HEP: Implement compensatory safe swallowing strategies for carryover outside of the treatment room.      Charges: [id number]       Total Timed Treatment: 0 min  Total Treatment Time: 45 min

## 2024-10-23 ENCOUNTER — E-ADVICE (OUTPATIENT)
Dept: UROLOGY | Age: 79
End: 2024-10-23

## 2024-10-31 ENCOUNTER — OFFICE VISIT (OUTPATIENT)
Dept: SPEECH THERAPY | Facility: HOSPITAL | Age: 79
End: 2024-10-31
Attending: STUDENT IN AN ORGANIZED HEALTH CARE EDUCATION/TRAINING PROGRAM
Payer: MEDICARE

## 2024-10-31 ENCOUNTER — HOSPITAL ENCOUNTER (OUTPATIENT)
Dept: CT IMAGING | Facility: HOSPITAL | Age: 79
Discharge: HOME | End: 2024-10-31
Attending: INTERNAL MEDICINE
Payer: MEDICARE

## 2024-10-31 DIAGNOSIS — J84.10 PULMONARY FIBROSIS (HCC): ICD-10-CM

## 2024-10-31 PROCEDURE — 71250 CT THORAX DX C-: CPT | Performed by: INTERNAL MEDICINE

## 2024-10-31 PROCEDURE — 92526 ORAL FUNCTION THERAPY: CPT

## 2024-10-31 NOTE — PROGRESS NOTES
Diagnosis:   Oropharyngeal Dysphagia R13.12      Referring Provider: Alverto Lofton  Date of Evaluation:    VFSS: 10/10/2024    Precautions:  Aspiration Next MD visit:   none scheduled  Date of Surgery: n/a   Insurance Primary/Secondary: HU[employer] MCR / N/A     # Auth Visits: Contact Insurance Team for auth            Subjective: No changes reported.      Pain: 0/10      Objective:   Date: 10/17/2024  TX#: 2 Date: 10/31/2024                TX#: 3 Date:                 TX#: 4 Date:                 TX#: 5 Date:   Tx#: 6 Date:   Tx#: 7   Eating Assessment Tool (EAT-10): Score=22  Score of 3+ is indicative of swallowing problem; score of 15+ indicates increased probability and risk of aspiration        VFSS results/recommendations review Safe swallowing recommendations reviewed and rationale behind recommendations discussed in detail.         Safe Swallowing Sequence:  Practiced x 2 with water   Hold Breath  Swallow  Cough  Swallow  Cough  Swallow Again        Expiratory Muscle Strength Training Device (EMST-150):  Calibration: 30 cm H2O  Blue Standard Mouthpiece  Completed x 5 repetitions/1 set Expiratory Muscle Strength Training Device (EMST-150):  Calibration: increased to 37.5 cm H2O  White Adaptive Mouthpiece  Nose Hold  Completed x 5 repetitions/1 set with Mod cues for strong, quick puffs of airflow through device        Dysphagia Exercises:    Tongue Base Retraction Exercises:  Tongue Hold x 5  Tongue Pull-back x 5  Tongue Sweep x 5    Hyolaryngeal Elevation Exercises:   Effortful Swallow attempted x 0/8    Models and mod-max verbal-visual cues         Assessment: Pt's wife, Elizabeth, attended today's treatment session.  Reviewed safe swallowing strategies and provided rationale behind implementation of these strategies.  Calibration of the Expiratory Muscle Strength Training Device (EMST-150) was increased to 37.5 cm H2O using the white adaptive mouthpiece with use of nose hold to increase strength of airflow  through device.  Completed x 1 set of 5 repetitions only as Pt reported today was rest day per device protocol.  Introduced tongue base retraction and hyolaryngel elevation exercises, including tongue hold, tongue pull back, tongue sweep, and effortful swallow.  Pt benefited from models and MOD-MAX verbal-visual cues to increase accuracy and ability to complete exercises.  Pt demonstrated the most difficulty with completing an effortful swallow with saliva bolus.  Continued therapy is recommended to address Pt's swallowing deficits and implement additional exercises, to target areas of deficit, and to continue to increase level of difficulty of EMST device calibration.                 Per VFSS, Overall Impression: Moderate-severe pharyngeal dysphagia was characterized by laryngeal penetration of puree and silent aspiration of mildly thick and thin liquids during and after the swallow.  With cues to hold breath before swallowing and cough after swallow, aspiration was avoided with puree and frequency of aspiration with mildly thick liquids was significantly less. Moderate vallecular and mild pyriform sinus retention remained primarily on the right.  Dysphagia is likely related to a combination of late effects of radiation and diagnosis of Parkinson's disease.  See recommendations below for diet, compensatory strategies and intensive dysphagia therapy.  Pt would also benefit from CXR given silent aspiration.     Goals:   (to be met 10 visits)  The patient will tolerate soft foods with extra moisture diet consistency and mildly thick liquids and trials of plain water without overt signs or symptoms of aspiration with 100 % accuracy  The patient/family/caregiver will demonstrate understanding and implementation of aspiration precautions and swallow strategies independently   Patient will reduce risk of aspiration by completing dysphagia exercises to 90% accuracy   Pt will participate in additional assessments related to  voice and cognitive-communication skills to address additional reported deficits associated with PD.        Diet Recommendations:  Solids: Soft bite sized with extra moisture, IDDSI 6  Liquids: Mildly thick liquids, IDDSI 2  Pt may have plain water after cleaning mouth thoroughly.  Use strategies!     Recommended compensatory strategies:   Sit upright  Small, single sips  Alternate liquids and solids  Hold breath, Swallow, Cough, swallow, cough and swallow again with every bite and sip  No straw     Medication Administration:  Crush meds & add to pureed  Take whole in pureed    Plan:   Recommend intensive dysphagia therapy.  Follow up with ENT and PCP to determine next steps.  Recommend continue therapy per current POC established as well as possible additional assessments and treatment related to recent diagnosis of Parkinson's Disease in September 2024 to include voice and cognitive-communication assessments.    HEP: Implement compensatory safe swallowing strategies for carryover outside of the treatment room; EMST-150; Dysphagia Exercises      Charges: [id number]       Total Timed Treatment: 0 min  Total Treatment Time: 45 min

## 2024-11-01 ENCOUNTER — TELEPHONE (OUTPATIENT)
Dept: INTERNAL MEDICINE CLINIC | Facility: CLINIC | Age: 79
End: 2024-11-01

## 2024-11-01 DIAGNOSIS — J43.9 PULMONARY EMPHYSEMA, UNSPECIFIED EMPHYSEMA TYPE (HCC): ICD-10-CM

## 2024-11-01 DIAGNOSIS — J61 PULMONARY ASBESTOSIS (HCC): Primary | ICD-10-CM

## 2024-11-01 DIAGNOSIS — J84.10 PULMONARY FIBROSIS (HCC): ICD-10-CM

## 2024-11-04 ENCOUNTER — APPOINTMENT (OUTPATIENT)
Dept: SPEECH THERAPY | Facility: HOSPITAL | Age: 79
End: 2024-11-04
Attending: STUDENT IN AN ORGANIZED HEALTH CARE EDUCATION/TRAINING PROGRAM
Payer: MEDICARE

## 2024-11-08 ENCOUNTER — APPOINTMENT (OUTPATIENT)
Dept: UROLOGY | Age: 79
End: 2024-11-08

## 2024-11-11 ENCOUNTER — APPOINTMENT (OUTPATIENT)
Dept: SPEECH THERAPY | Facility: HOSPITAL | Age: 79
End: 2024-11-11
Attending: STUDENT IN AN ORGANIZED HEALTH CARE EDUCATION/TRAINING PROGRAM
Payer: MEDICARE

## 2024-11-18 ENCOUNTER — APPOINTMENT (OUTPATIENT)
Dept: SPEECH THERAPY | Facility: HOSPITAL | Age: 79
End: 2024-11-18
Attending: STUDENT IN AN ORGANIZED HEALTH CARE EDUCATION/TRAINING PROGRAM
Payer: MEDICARE

## 2024-11-25 ENCOUNTER — APPOINTMENT (OUTPATIENT)
Dept: SPEECH THERAPY | Facility: HOSPITAL | Age: 79
End: 2024-11-25
Attending: STUDENT IN AN ORGANIZED HEALTH CARE EDUCATION/TRAINING PROGRAM
Payer: MEDICARE

## 2024-12-02 ENCOUNTER — PATIENT MESSAGE (OUTPATIENT)
Dept: OTOLARYNGOLOGY | Facility: CLINIC | Age: 79
End: 2024-12-02

## 2024-12-04 NOTE — TELEPHONE ENCOUNTER
Baron Alamo attached a letter for you in his [employer] message, he needs you to write a letter for a [employer] disability claim. Please advise.

## 2024-12-16 NOTE — TELEPHONE ENCOUNTER
Requested Prescriptions     Pending Prescriptions Disp Refills    CARBIDOPA-LEVODOPA  MG Oral Tab [Pharmacy Med Name: CARBIDOPA/LEVODOPA 25-100MG TABS] 135 tablet 0     Sig: TAKE 1/2 TABLET BY MOUTH THREE TIMES DAILY       Last OV: 9/17/2024  Next OV: Next Appt: With Neurology (Dalila Bejarano DO)   12/18/2024 at 11:00 AM    [place]/;  Carbidopa-Levodopa     Dispensed Written Strength Quantity Refills Days Supply Provider Pharmacy   CARBIDOPA/LEVODOPA 25-100MG TABS 09/17/2024 09/17/2024  135 each  90 Dalila Bejarano DO [facility]...       Last office visit plan;   ASSESSMENT:  The patient is a 78 year old  man with past medical history of orthostatic hypotension, lumbar spondylosis with possible left L4 radiculopathy status post spinal cord stimulator, hyperlipidemia, osteopenia, glaucoma, coronary artery disease, abdominal aortic aneurysm, head/neck and tongue cancer presents for follow up.       He has mild parkinsonian features on exam: hypomimia, hypophonia, bradykinesia; now with rigidity of upper extremities.     -Cannot have MRI due to spinal cord stimulator.  -Neuropathy labs: Elevated kappa, elevated kappa/lambda ratio.  Normal immunofixation.  Hemoglobin A1c 5.9 consistent with prediabetes.  Normal heavy metals, vitamin E, copper, B12/MMA, TSH, thiamine and vitamin B6 levels.     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  No resting tremor noted.  Likely has slow disease process.  - Trial of Sinemet 0.5 tablets 3 times daily  - May consider DaTscan depending on response to Sinemet  - Discussed management of constipation with increasing fiber intake, Metamucil or MiraLAX as needed  - We discussed fall precautions in detail  - Discussed behavior sleep disorder precautions at prior office visit, he is already sleeping in a separate room from his wife, I advised padded bed railings and keeping  furniture like nightstands away from the bed.       Length dependent peripheral neuropathy has seen hematology in 2021 for elevated kappa immunoglobulin free light chain.  -Podiatry follow up yearly      Follow up in 3 months

## 2024-12-17 RX ORDER — CARBIDOPA AND LEVODOPA 25; 100 MG/1; MG/1
0.5 TABLET ORAL 3 TIMES DAILY
Qty: 135 TABLET | Refills: 0 | Status: SHIPPED | OUTPATIENT
Start: 2024-12-17 | End: 2024-12-18

## 2024-12-18 ENCOUNTER — OFFICE VISIT (OUTPATIENT)
Dept: NEUROLOGY | Facility: CLINIC | Age: 79
End: 2024-12-18
Payer: MEDICARE

## 2024-12-18 VITALS
HEIGHT: 68 IN | SYSTOLIC BLOOD PRESSURE: 96 MMHG | WEIGHT: 150 LBS | HEART RATE: 73 BPM | BODY MASS INDEX: 22.73 KG/M2 | DIASTOLIC BLOOD PRESSURE: 56 MMHG

## 2024-12-18 DIAGNOSIS — M47.816 LUMBAR SPONDYLOSIS: ICD-10-CM

## 2024-12-18 DIAGNOSIS — G20.A1 PARKINSON'S DISEASE WITHOUT DYSKINESIA OR FLUCTUATING MANIFESTATIONS (HCC): Primary | ICD-10-CM

## 2024-12-18 DIAGNOSIS — R26.81 GAIT INSTABILITY: ICD-10-CM

## 2024-12-18 DIAGNOSIS — M21.372 BILATERAL FOOT-DROP: ICD-10-CM

## 2024-12-18 DIAGNOSIS — G62.9 NEUROPATHY: ICD-10-CM

## 2024-12-18 DIAGNOSIS — M21.371 BILATERAL FOOT-DROP: ICD-10-CM

## 2024-12-18 PROCEDURE — 99214 OFFICE O/P EST MOD 30 MIN: CPT | Performed by: OTHER

## 2024-12-18 PROCEDURE — 3074F SYST BP LT 130 MM HG: CPT | Performed by: OTHER

## 2024-12-18 PROCEDURE — 1160F RVW MEDS BY RX/DR IN RCRD: CPT | Performed by: OTHER

## 2024-12-18 PROCEDURE — 1159F MED LIST DOCD IN RCRD: CPT | Performed by: OTHER

## 2024-12-18 PROCEDURE — 3078F DIAST BP <80 MM HG: CPT | Performed by: OTHER

## 2024-12-18 PROCEDURE — 3008F BODY MASS INDEX DOCD: CPT | Performed by: OTHER

## 2024-12-18 RX ORDER — CARBIDOPA AND LEVODOPA 25; 100 MG/1; MG/1
0.5 TABLET ORAL 3 TIMES DAILY
Qty: 135 TABLET | Refills: 3 | Status: SHIPPED | OUTPATIENT
Start: 2024-12-18 | End: 2025-12-13

## 2024-12-18 NOTE — PROGRESS NOTES
[facility]  [address], [address]  [facility] [id number] [phone]          Established  Follow Up Visit       Date: December 18, 2024  Patient Name: Baron Cleaning   MRN: [id number]  Primary physician: Navdeep Galicia  [facility] [id number]    Interval History:   -- The patient is presenting with gait instability.  Did not notice much improvement on the carbidopa levodopa but he is tolerating it well.  He has a spinal cord stimulator that is going to be checked after today's office visit because he is experiencing a lot more of his chronic low back pain and hip pain.  The wife helps provide details of history as well as the patient.      OUTPATIENT MEDICATIONS  Medications Ordered Prior to Encounter[1]      PHYSICAL EXAM:   BP 96/56   Pulse 73   Ht 68\"   Wt 150 lb (68 kg)   BMI 22.81 kg/m²   General appearance: Well appearing, and in no acute distress  Skin: skin color, texture normal.  No rashes or lesions.    Head: Normocephalic, atraumatic.    Neurological exam:    Mental status: The patient is awake and alert, conversant, with no evidence of aphasia.  Cranial nerves: No dysarthria, does have hypomimia and hypophonia  Motor: Upper extremities have full strength at handgrip, deltoids, biceps and triceps.  There is increased tone of his upper extremities and bradykinesia throughout.  No significant resting tremor observed.  His lower extremities have about 4/5 strength with bilateral foot drop, dorsiflexion bilaterally 3/5, plantarflexion 5/5.  Gait: Parkinsonian with reduced arm swing and steppage gait.    LABS/DATA:  CBC, CMP reviewed   ALP elevated     IMAGING:  N/A    ASSESSMENT:  The patient is a 79 year old  man with past medical history of orthostatic hypotension, lumbar spondylosis with possible left L4 radiculopathy status post spinal cord stimulator, hyperlipidemia, osteopenia, glaucoma, coronary artery disease, abdominal aortic aneurysm, head/neck and tongue  cancer presents for follow up.       He has mild parkinsonism on exam with improved resting tremors.  He has bilateral foot drop and lower extremity weakness which is likely related to lumbar spondylosis/radiculopathy, hip pain, neuropathy with motor involvement as well.     -Cannot have MRI due to spinal cord stimulator.  -Neuropathy labs: Elevated kappa, elevated kappa/lambda ratio.  Normal immunofixation.  Hemoglobin A1c 5.9 consistent with prediabetes.  Normal heavy metals, vitamin E, copper, B12/MMA, TSH, thiamine and vitamin B6 levels.     Parkinson disease without dyskinesias, mild-moderate in severity.  -Continue Sinemet 0.5 tablets 3 times daily.  May increase at follow-up after completing physical therapy.  -Fall precautions discussed and detailed (see below)  - Sleep disorder precautions have been discussed  - Constipation management has been discussed    Gait instability is likely multifactorial as stated above.  His neuropathy may have progressed to involve motor as well as sensory; lumbosacral spondylosis with radiculopathy; hip pathology; chronic pain syndrome from his low back and hip  -Home health PT, OT and speech therapy  - We discussed his foot drop bilaterally, he may benefit from an AFO boot I have requested physical therapy to help with; he may benefit from a cane/walker which I have discussed with the patient and his wife  - He should follow-up with podiatry for his neuropathy    Follow-up in 3 months    Discussed indication, administration, dose, and side effects with patient of any medications personally prescribed. Patient was advised to let my office know if they have any questions or concerns.       Today, I personally spent 20 minutes in this case, including chart review, time spent with patient doing face to face evaluation w/ interview and exam and patient education, counseling, and time was spent in patient education, counseling, and coordination of care as described above.   Issues  discussed: Diagnosis and implications on future health, benefits and side effects of present and future medications, test results as well as further testing and medications required.    This note was prepared using Dragon Medical voice recognition dictation software and as a result, errors may occur. When identified, these errors have been corrected. While every attempt is made to correct errors during dictation, discrepancies may still exist    BRANDEN Bejarano DO   Staff Physician, Neurology   12/18/2024  11:35 AM               [1]   Current Outpatient Medications on File Prior to Visit   Medication Sig Dispense Refill    CARBIDOPA-LEVODOPA  MG Oral Tab TAKE 1/2 TABLET BY MOUTH THREE TIMES DAILY 135 tablet 0    acetaminophen 500 MG Oral Tab Take 1 tablet (500 mg total) by mouth every 6 (six) hours as needed for Pain.      atorvastatin 40 MG Oral Tab Take 1 tablet (40 mg total) by mouth daily.      Multiple Vitamins-Minerals (ICAPS AREDS FORMULA) Oral Tab Take 1 tablet by mouth daily.      aspirin 81 MG Oral Tab Take 1 tablet (81 mg total) by mouth daily.      Vitamin C (VITAMIN C) 500 MG Oral Tab Take 1 tablet (500 mg total) by mouth daily.      Multiple Vitamins-Minerals (MENS MULTIVITAMIN PLUS) Oral Tab Take 1 tablet by mouth daily.      Calcium Carb-Cholecalciferol 600-200 MG-UNIT Oral Tab Take 1 tablet by mouth 2 (two) times daily.      Mirabegron ER 50 MG Oral Tablet 24 Hr Take 1 tablet (50 mg total) by mouth daily. (Patient not taking: Reported on 9/23/2024)       No current facility-administered medications on file prior to visit.

## 2024-12-23 ENCOUNTER — TELEPHONE (OUTPATIENT)
Dept: NEUROLOGY | Facility: CLINIC | Age: 79
End: 2024-12-23

## 2024-12-24 ENCOUNTER — PATIENT MESSAGE (OUTPATIENT)
Dept: INTERNAL MEDICINE CLINIC | Facility: CLINIC | Age: 79
End: 2024-12-24

## 2024-12-24 DIAGNOSIS — E78.5 HYPERLIPIDEMIA, UNSPECIFIED HYPERLIPIDEMIA TYPE: Primary | ICD-10-CM

## 2024-12-24 DIAGNOSIS — R53.83 FATIGUE, UNSPECIFIED TYPE: ICD-10-CM

## 2024-12-24 DIAGNOSIS — E55.9 VITAMIN D DEFICIENCY: ICD-10-CM

## 2024-12-26 ENCOUNTER — APPOINTMENT (OUTPATIENT)
Dept: SPEECH THERAPY | Facility: HOSPITAL | Age: 79
End: 2024-12-26
Attending: STUDENT IN AN ORGANIZED HEALTH CARE EDUCATION/TRAINING PROGRAM
Payer: MEDICARE

## 2025-01-02 ENCOUNTER — APPOINTMENT (OUTPATIENT)
Dept: SPEECH THERAPY | Facility: HOSPITAL | Age: 80
End: 2025-01-02
Attending: STUDENT IN AN ORGANIZED HEALTH CARE EDUCATION/TRAINING PROGRAM
Payer: MEDICARE

## 2025-01-03 ENCOUNTER — LAB ENCOUNTER (OUTPATIENT)
Dept: LAB | Age: 80
End: 2025-01-03
Attending: INTERNAL MEDICINE
Payer: MEDICARE

## 2025-01-03 ENCOUNTER — APPOINTMENT (OUTPATIENT)
Dept: UROLOGY | Age: 80
End: 2025-01-03

## 2025-01-03 VITALS
RESPIRATION RATE: 16 BRPM | OXYGEN SATURATION: 94 % | SYSTOLIC BLOOD PRESSURE: 115 MMHG | WEIGHT: 154.7 LBS | TEMPERATURE: 97.8 F | BODY MASS INDEX: 23.52 KG/M2 | HEART RATE: 72 BPM | DIASTOLIC BLOOD PRESSURE: 64 MMHG

## 2025-01-03 DIAGNOSIS — N32.81 OVERACTIVE BLADDER: ICD-10-CM

## 2025-01-03 DIAGNOSIS — R53.83 FATIGUE, UNSPECIFIED TYPE: ICD-10-CM

## 2025-01-03 DIAGNOSIS — N39.0 ACUTE UTI: Primary | ICD-10-CM

## 2025-01-03 DIAGNOSIS — E78.5 HYPERLIPIDEMIA, UNSPECIFIED HYPERLIPIDEMIA TYPE: ICD-10-CM

## 2025-01-03 DIAGNOSIS — N20.0 NEPHROLITHIASIS: ICD-10-CM

## 2025-01-03 DIAGNOSIS — E55.9 VITAMIN D DEFICIENCY: ICD-10-CM

## 2025-01-03 DIAGNOSIS — R31.0 GROSS HEMATURIA: ICD-10-CM

## 2025-01-03 LAB
ALBUMIN SERPL-MCNC: 4.2 G/DL (ref 3.2–4.8)
ALBUMIN/GLOB SERPL: 1.3 {RATIO} (ref 1–2)
ALP LIVER SERPL-CCNC: 131 U/L
ALT SERPL-CCNC: 8 U/L
ANION GAP SERPL CALC-SCNC: 8 MMOL/L (ref 0–18)
APPEARANCE, POC: CLEAR
AST SERPL-CCNC: 33 U/L (ref ?–34)
BASOPHILS # BLD AUTO: 0.03 X10(3) UL (ref 0–0.2)
BASOPHILS NFR BLD AUTO: 0.4 %
BILIRUB SERPL-MCNC: 0.6 MG/DL (ref 0.2–1.1)
BILIRUB UR QL STRIP: NEGATIVE
BILIRUB UR QL: NEGATIVE
BLDR SCAN MLS: NORMAL
BUN BLD-MCNC: 31 MG/DL (ref 9–23)
BUN/CREAT SERPL: 27.4 (ref 10–20)
CALCIUM BLD-MCNC: 10 MG/DL (ref 8.7–10.4)
CHLORIDE SERPL-SCNC: 105 MMOL/L (ref 98–112)
CHOLEST SERPL-MCNC: 128 MG/DL (ref ?–200)
CO2 SERPL-SCNC: 28 MMOL/L (ref 21–32)
COLOR UR: YELLOW
COLOR UR: YELLOW
CREAT BLD-MCNC: 1.13 MG/DL
DEPRECATED RDW RBC AUTO: 41.1 FL (ref 35.1–46.3)
EGFRCR SERPLBLD CKD-EPI 2021: 66 ML/MIN/1.73M2 (ref 60–?)
EOSINOPHIL # BLD AUTO: 0.03 X10(3) UL (ref 0–0.7)
EOSINOPHIL NFR BLD AUTO: 0.4 %
ERYTHROCYTE [DISTWIDTH] IN BLOOD BY AUTOMATED COUNT: 13.2 % (ref 11–15)
FASTING PATIENT LIPID ANSWER: NO
FASTING STATUS PATIENT QL REPORTED: NO
GLOBULIN PLAS-MCNC: 3.2 G/DL (ref 2–3.5)
GLUCOSE BLD-MCNC: 92 MG/DL (ref 70–99)
GLUCOSE UR-MCNC: NEGATIVE MG/DL
GLUCOSE UR-MCNC: NORMAL MG/DL
HCT VFR BLD AUTO: 40.8 %
HDLC SERPL-MCNC: 43 MG/DL (ref 40–59)
HGB BLD-MCNC: 13.5 G/DL
HGB UR QL STRIP.AUTO: NEGATIVE
HGB UR QL STRIP: NEGATIVE
IMM GRANULOCYTES # BLD AUTO: 0.04 X10(3) UL (ref 0–1)
IMM GRANULOCYTES NFR BLD: 0.6 %
KETONES UR STRIP-MCNC: NEGATIVE MG/DL
KETONES UR-MCNC: NEGATIVE MG/DL
LDLC SERPL CALC-MCNC: 70 MG/DL (ref ?–100)
LEUKOCYTE ESTERASE UR QL STRIP.AUTO: 500
LEUKOCYTE ESTERASE UR QL STRIP: ABNORMAL
LYMPHOCYTES # BLD AUTO: 1.05 X10(3) UL (ref 1–4)
LYMPHOCYTES NFR BLD AUTO: 15.4 %
MCH RBC QN AUTO: 28.5 PG (ref 26–34)
MCHC RBC AUTO-ENTMCNC: 33.1 G/DL (ref 31–37)
MCV RBC AUTO: 86.1 FL
MONOCYTES # BLD AUTO: 0.7 X10(3) UL (ref 0.1–1)
MONOCYTES NFR BLD AUTO: 10.2 %
NEUTROPHILS # BLD AUTO: 4.99 X10 (3) UL (ref 1.5–7.7)
NEUTROPHILS # BLD AUTO: 4.99 X10(3) UL (ref 1.5–7.7)
NEUTROPHILS NFR BLD AUTO: 73 %
NITRITE UR QL STRIP.AUTO: NEGATIVE
NITRITE UR QL STRIP: NEGATIVE
NONHDLC SERPL-MCNC: 85 MG/DL (ref ?–130)
OSMOLALITY SERPL CALC.SUM OF ELEC: 298 MOSM/KG (ref 275–295)
PH UR: 5.5 [PH] (ref 5–7)
PH UR: 6 [PH] (ref 5–8)
PLATELET # BLD AUTO: 260 10(3)UL (ref 150–450)
POTASSIUM SERPL-SCNC: 4.7 MMOL/L (ref 3.5–5.1)
PROT SERPL-MCNC: 7.4 G/DL (ref 5.7–8.2)
PROT UR-MCNC: 20 MG/DL
PROT UR-MCNC: ABNORMAL MG/DL
RBC # BLD AUTO: 4.74 X10(6)UL
SODIUM SERPL-SCNC: 141 MMOL/L (ref 136–145)
SP GR UR STRIP: 1.02 (ref 1–1.03)
SP GR UR: 1.02 (ref 1–1.03)
TRIGL SERPL-MCNC: 76 MG/DL (ref 30–149)
TSI SER-ACNC: 2.52 UIU/ML (ref 0.55–4.78)
UROBILINOGEN UR STRIP-ACNC: NORMAL
UROBILINOGEN UR-MCNC: 1 MG/DL (ref 0–1)
VIT B12 SERPL-MCNC: 632 PG/ML (ref 211–911)
VIT D+METAB SERPL-MCNC: 51.3 NG/ML (ref 30–100)
VLDLC SERPL CALC-MCNC: 12 MG/DL (ref 0–30)
WBC # BLD AUTO: 6.8 X10(3) UL (ref 4–11)
WBC #/AREA URNS AUTO: >50 /HPF
WBC CLUMPS UR QL AUTO: PRESENT /HPF

## 2025-01-03 PROCEDURE — 84443 ASSAY THYROID STIM HORMONE: CPT

## 2025-01-03 PROCEDURE — 82607 VITAMIN B-12: CPT

## 2025-01-03 PROCEDURE — 82306 VITAMIN D 25 HYDROXY: CPT

## 2025-01-03 PROCEDURE — 81001 URINALYSIS AUTO W/SCOPE: CPT | Performed by: INTERNAL MEDICINE

## 2025-01-03 PROCEDURE — 87077 CULTURE AEROBIC IDENTIFY: CPT | Performed by: INTERNAL MEDICINE

## 2025-01-03 PROCEDURE — 80053 COMPREHEN METABOLIC PANEL: CPT

## 2025-01-03 PROCEDURE — 87086 URINE CULTURE/COLONY COUNT: CPT | Performed by: INTERNAL MEDICINE

## 2025-01-03 PROCEDURE — 87186 SC STD MICRODIL/AGAR DIL: CPT | Performed by: INTERNAL MEDICINE

## 2025-01-03 PROCEDURE — 80061 LIPID PANEL: CPT

## 2025-01-03 PROCEDURE — 85025 COMPLETE CBC W/AUTO DIFF WBC: CPT

## 2025-01-03 PROCEDURE — 36415 COLL VENOUS BLD VENIPUNCTURE: CPT

## 2025-01-03 RX ORDER — CIPROFLOXACIN 500 MG/1
500 TABLET, FILM COATED ORAL 2 TIMES DAILY
Qty: 14 TABLET | Refills: 0 | Status: SHIPPED | OUTPATIENT
Start: 2025-01-03 | End: 2025-01-10

## 2025-01-05 LAB — BACTERIA UR CULT: ABNORMAL

## 2025-01-06 ENCOUNTER — OFFICE VISIT (OUTPATIENT)
Dept: INTERNAL MEDICINE CLINIC | Facility: CLINIC | Age: 80
End: 2025-01-06

## 2025-01-06 ENCOUNTER — TELEPHONE (OUTPATIENT)
Dept: INTERNAL MEDICINE CLINIC | Facility: CLINIC | Age: 80
End: 2025-01-06

## 2025-01-06 VITALS
HEART RATE: 66 BPM | TEMPERATURE: 98 F | WEIGHT: 153 LBS | HEIGHT: 68 IN | SYSTOLIC BLOOD PRESSURE: 114 MMHG | DIASTOLIC BLOOD PRESSURE: 68 MMHG | BODY MASS INDEX: 23.19 KG/M2 | OXYGEN SATURATION: 100 %

## 2025-01-06 DIAGNOSIS — J92.9 PLEURAL PLAQUE: ICD-10-CM

## 2025-01-06 DIAGNOSIS — J84.10 PULMONARY FIBROSIS (HCC): ICD-10-CM

## 2025-01-06 DIAGNOSIS — Z86.79 STATUS POST REPAIR OF ABDOMINAL AORTIC ANEURYSM (AAA) USING BIFURCATION GRAFT: ICD-10-CM

## 2025-01-06 DIAGNOSIS — R63.4 WEIGHT LOSS: ICD-10-CM

## 2025-01-06 DIAGNOSIS — G20.A1 PARKINSON'S DISEASE WITHOUT DYSKINESIA OR FLUCTUATING MANIFESTATIONS (HCC): ICD-10-CM

## 2025-01-06 DIAGNOSIS — R53.83 FATIGUE, UNSPECIFIED TYPE: ICD-10-CM

## 2025-01-06 DIAGNOSIS — Z85.810 HISTORY OF TONGUE CANCER: ICD-10-CM

## 2025-01-06 DIAGNOSIS — Z00.00 ROUTINE HEALTH MAINTENANCE: ICD-10-CM

## 2025-01-06 DIAGNOSIS — Z86.0101 HISTORY OF ADENOMATOUS POLYP OF COLON: ICD-10-CM

## 2025-01-06 DIAGNOSIS — E78.5 HYPERLIPIDEMIA, UNSPECIFIED HYPERLIPIDEMIA TYPE: Primary | ICD-10-CM

## 2025-01-06 DIAGNOSIS — Z96.89 S/P INSERTION OF SPINAL CORD STIMULATOR: ICD-10-CM

## 2025-01-06 DIAGNOSIS — Z95.828 STATUS POST REPAIR OF ABDOMINAL AORTIC ANEURYSM (AAA) USING BIFURCATION GRAFT: ICD-10-CM

## 2025-01-06 RX ORDER — CIPROFLOXACIN 500 MG/1
500 TABLET, FILM COATED ORAL 2 TIMES DAILY
COMMUNITY
Start: 2025-01-03 | End: 2025-01-10

## 2025-01-06 NOTE — PROGRESS NOTES
Baron Cleaning is a 79 year old male.  HPI:     Chief Complaint   Patient presents with    Follow - Up     3 month      Baron is here with his wife Ayesha.    He does have Parkinson's disease diagnosed by Dr. Bejarano.  He is on Sinemet.  He is getting home physical therapy.    He also has what appears to be a fair amount of saliva.  He has had a video swallow.  He does have some exercises.  He did see .  We talked about this.  I thought it might be wise for him to see ENT again.  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  Again it might be wise to see ENT again.    He does have fatigue.  He sleeps a lot.  Etiology unclear.  I did do his labs.  His BUN was 31 and creatinine 1.13.  GFR 66.  He does drink plenty of fluids.  He is not anemic.  His thyroid function is good.  Lipids at goal.  Vitamin B12 was unremarkable.  No anemia.    I did discuss that I am not quite sure about why he has the fatigue.    He did see Dr. Macias his urologist.  Urinalysis showed moderate amount of leukocyte esterace.  A urine culture did show ,000 of Klebsiella.  To Cipro.  He was given Cipro and told to take it if he gets any symptoms.  He has had some blood in his urine twice but nothing major.  He does have some frequency but this seems to be chronic.  He does not have any dysuria.  I did ask that he connect with Dr. Macias again to see if he should in fact take his Cipro.    He will see  January 9 for an abnormal CT scan of the chest.  CT scan chest done October 31, 2024 showed pleural plaquing.  Mild emphysema.  There is evidence of mild subpleural fibrosis in both lung bases.  Minimal honeycombing left lower lobe.  History of CABG.    I actually did a urinalysis and urine culture 2 and he does have Klebsiella that is sensitive to Cipro.    He does have a history of aortic graft status post repair April 3, 2015.  Per  2-year follow-up recommended  which will be May 2026.  At that time liver showed multiple calcifications suggestive of remote granulomatous disease.  Spleen showed multiple calcifications.  Pancreas was unremarkable.  Adrenals unremarkable.  Kidneys showed a few small subcentimeter hypodense lesions bilaterally which are too small to characterize but statistically likely representing cysts.  There was a large amount of excessive stool in the colon.  Bladder wall thickening greater than what would be expected for underdistention likely due to chronic bladder outlet obstruction.    He had on video swallow intermittent laryngeal penetration without aspiration.    He has lost about 7 pounds over the last 6 months.  He is eating well.  We talked about this.  We talked about the fact that he did have a CT of chest showing no lung cancer but the above findings.  I thought it might be reasonable to repeat CT of abdomen pelvis looking for occult malignancy.  If none found that he may benefit from updated colonoscopy.  His last colonoscopy was July 2021.  Next colonoscopy will ordinarily be due July 2026 but he may benefit from moving this up to this year along with EGD if needed after we get the results of CT of abdomen and pelvis.    He wishes not to pursue any of the vaccines.      Current Outpatient Medications   Medication Sig Dispense Refill    ciprofloxacin 500 MG Oral Tab Take 1 tablet (500 mg total) by mouth 2 (two) times daily. If needed for UTI symptoms      carbidopa-levodopa  MG Oral Tab Take 0.5 tablets by mouth 3 (three) times daily. 135 tablet 3    acetaminophen 500 MG Oral Tab Take 1 tablet (500 mg total) by mouth every 6 (six) hours as needed for Pain.      atorvastatin 40 MG Oral Tab Take 1 tablet (40 mg total) by mouth daily.      Multiple Vitamins-Minerals (ICAPS AREDS FORMULA) Oral Tab Take 1 tablet by mouth daily.      aspirin 81 MG Oral Tab Take 1 tablet (81 mg total) by mouth daily.      Vitamin C (VITAMIN C) 500 MG Oral Tab  Take 1 tablet (500 mg total) by mouth daily.      Multiple Vitamins-Minerals (MENS MULTIVITAMIN PLUS) Oral Tab Take 1 tablet by mouth daily.      Calcium Carb-Cholecalciferol 600-200 MG-UNIT Oral Tab Take 1 tablet by mouth 2 (two) times daily.        Past Medical History:    AAA (abdominal aortic aneurysm) (HCC)    Appendicitis    Arthritis    ASHD (arteriosclerotic heart disease)    LIMA graft to LAD    Cancer (HCC)    Chronic hip pain    s/p spinal cord stimulator    Colon polyps    Congestive heart disease (HCC)    Diabetes insipidus (HCC)    Elevated prostate specific antigen (PSA)    Elevated PSA    Fall    c/b rib fractures    Glaucoma    slight glaucoma in one eye    Hiatal hernia    Hypomagnesemia    Low TSH level    Lung disease    Asbestos related lung disease    Nonspecific abnormal serum enzyme levels    Orthostatic hypotension    Other and unspecified hyperlipidemia    Parkinson disease (HCC)    PEG (percutaneous endoscopic gastrostomy) status (HCC)    Pleural plaque    calcified pleural plaques    Pleurisy    Tongue cancer (HCC)    squamous cell carcinoma      Social History:  Social History     Socioeconomic History    Marital status:    Tobacco Use    Smoking status: Former     Current packs/day: 0.00     Average packs/day: 0.8 packs/day for 25.0 years (18.8 ttl pk-yrs)     Types: Cigarettes     Start date: 6/15/1978     Quit date: 6/15/2003     Years since quittin.5     Passive exposure: Past    Smokeless tobacco: Never   Vaping Use    Vaping status: Never Used   Substance and Sexual Activity    Alcohol use: No    Drug use: No   Other Topics Concern    Caffeine Concern Yes     Comment: Coffee 1 cup daily    Exercise Yes     Comment: 3 days weekly     Social Drivers of Health      [facility]    Social Connections    Received from [facility], [facility]    Housing Stability         REVIEW OF SYSTEMS:   GENERAL HEALTH:  feels well otherwise  RESPIRATORY:  Voices no shortness of breath with exertion or cough  CARDIOVASCULAR:  Voices no chest pain on exertion or shortness of breath  GI:   Voices no abdominal pain or changes of bowels   : see above  NEURO:  Voices no  headaches or dizziness    EXAM:   /68   Pulse 66   Temp 97.6 °F (36.4 °C)   Ht 5' 8\" (1.727 m)   Wt 153 lb (69.4 kg)   SpO2 100%   BMI 23.26 kg/m²     GENERAL:  well developed, well nourished, in no apparent distress  SKIN:  no rashes , no suspicious lesions  HEENT: atraumatic.   Pharynx normal without exudate.  EYES:  PERRL. Sclera anicteric.  NECK:  Supple,  no adenopathy,   LUNGS:  clear to auscultation.  Effort normal  CARDIO:  RRR without murmur.   S1 and S2 normal  GI:  good BS's,  no masses,   HSM or tenderness  EXTREMITIES : no cyanosis, clubbing or edema    ASSESSMENT AND PLAN:     1. Hyperlipidemia, unspecified hyperlipidemia type  Hyperlipidemia.  On statin.  History of CABG and aortic aneurysm repair.  On statin.    2. History of tongue cancer  History of tongue cancer remotely.  No clinical evidence of recurrence.  He has seen .  It might be duenas to see him again because of the saliva and voice changes which is mostly a raspy gurgling type voice.    3. History of adenomatous polyp of colon  Next colonoscopy ordinarily would be due July 2026.  May need to move this up after review of CT of abdomen pelvis.    4. S/P insertion of spinal cord stimulator  Final cord stimulator in place.    5. Routine health maintenance  See above for vaccine discussion.    6. Parkinson's disease without dyskinesia or fluctuating manifestations (HCC)  On Sinemet.  He does have fatigue.  Etiology unclear.  He follows with Dr. Bejarano.    7. Fatigue, unspecified type  No exact cause.  He has lost weight.  Will get CT of abdomen pelvis and then decide if he needs any upper and lower endoscopy.    8. Status post repair of  abdominal aortic aneurysm (AAA) using bifurcation graft  Up-to-date with abdominal aortic aneurysm CTA.    9. Pulmonary fibrosis (HCC)  He will see pulmonary.    10. Pleural plaque  He will be seeing pulmonary.    11. Weight loss  Etiology unclear.  Will get CT of abdomen pelvis.    This visit was 40 minutes.  I spent 10 minutes before visit preparing and reviewing old records.  Greater than 50% of the visit was engaged in counseling and review of past data.     The patient indicates understanding of these issues and agrees to the plan.    Be Alvarado MD  1/6/2025  5:00 PM

## 2025-01-07 ENCOUNTER — TELEPHONE (OUTPATIENT)
Dept: UROLOGY | Age: 80
End: 2025-01-07

## 2025-01-08 ENCOUNTER — PATIENT MESSAGE (OUTPATIENT)
Dept: NEUROLOGY | Facility: CLINIC | Age: 80
End: 2025-01-08

## 2025-01-08 DIAGNOSIS — G20.A1 PARKINSON'S DISEASE WITHOUT DYSKINESIA OR FLUCTUATING MANIFESTATIONS (HCC): Primary | ICD-10-CM

## 2025-01-09 ENCOUNTER — OFFICE VISIT (OUTPATIENT)
Dept: PULMONOLOGY | Facility: CLINIC | Age: 80
End: 2025-01-09
Payer: MEDICARE

## 2025-01-09 VITALS
BODY MASS INDEX: 22.43 KG/M2 | OXYGEN SATURATION: 93 % | WEIGHT: 148 LBS | RESPIRATION RATE: 14 BRPM | SYSTOLIC BLOOD PRESSURE: 122 MMHG | HEIGHT: 68 IN | DIASTOLIC BLOOD PRESSURE: 72 MMHG | HEART RATE: 66 BPM

## 2025-01-09 DIAGNOSIS — J92.0 CALCIFIED PLEURAL PLAQUE DUE TO ASBESTOS EXPOSURE: Primary | ICD-10-CM

## 2025-01-09 DIAGNOSIS — J84.9 ILD (INTERSTITIAL LUNG DISEASE) (HCC): ICD-10-CM

## 2025-01-09 DIAGNOSIS — J43.9 PULMONARY EMPHYSEMA, UNSPECIFIED EMPHYSEMA TYPE (HCC): ICD-10-CM

## 2025-01-09 RX ORDER — CARBIDOPA AND LEVODOPA 25; 100 MG/1; MG/1
0.5 TABLET ORAL 2 TIMES DAILY
Qty: 90 TABLET | Refills: 3 | Status: SHIPPED | OUTPATIENT
Start: 2025-01-09 | End: 2026-01-04

## 2025-01-09 NOTE — PROGRESS NOTES
Initial Pulmonary Medicine Outpatient Consultation           Reason for Consultation and CC: abnormal chest imaging      Referring Physician: Dr. Alvarado       HPI: I had the pleasure of seeing Baron Cleaning for a Pulmonary Medicine consultation on 1/9/25.  80 yo male with hx of AAA, CAD, CHF, previous tongue cancer, Parkinson's disease,   Being seen for an abnormal chest CT scan done in 10/2024.   Has been experiencing throat issues and referred to the ENT who did imaging and found abnormalities in the CXR which then led to getting a chest CT scan.   Overall, feels breathing has remained stable and denies recent coughing, wheezing, chest congestion, shortness of breath, fevers, chills, or sweats.   Recently diagnosed with Parkinson's disease.      REVIEW OF SYSTEMS:  Positives and negatives as stated in HPI. Remainder of 12 pt review of systems otherwise are negative.       PAST MEDICAL HISTORY:  Past Medical History:    AAA (abdominal aortic aneurysm) (HCC)    Appendicitis    Arthritis    ASHD (arteriosclerotic heart disease)    LIMA graft to LAD    Cancer (HCC)    Chronic hip pain    s/p spinal cord stimulator    Colon polyps    Congestive heart disease (HCC)    Diabetes insipidus (HCC)    Elevated prostate specific antigen (PSA)    Elevated PSA    Fall    c/b rib fractures    Glaucoma    slight glaucoma in one eye    Hiatal hernia    Hypomagnesemia    Low TSH level    Lung disease    Asbestos related lung disease    Nonspecific abnormal serum enzyme levels    Orthostatic hypotension    Other and unspecified hyperlipidemia    Parkinson disease (HCC)    PEG (percutaneous endoscopic gastrostomy) status (HCC)    Pleural plaque    calcified pleural plaques    Pleurisy    Tongue cancer (HCC)    squamous cell carcinoma          PAST SURGICAL HISTORY:  Past Surgical History:   Procedure Laterality Date    Appendectomy  2009    Back surgery  2020    Cabg  2003    single bypass LIMA graft to LAD    Cataract  04/2019     bilateral    Colonoscopy      Inguinal hernia repair Left 2024    Ir repair aaa endovascular      Knee replacement surgery      Knee surgery Right 1972    cartilage removed; replacement 2021    Other surgical history  2014    Modified Neck Dissection    Other surgical history  2014    Excision of Tongue Lesion    Other surgical history      repair abdominal aneurysm    Other surgical history  2020    spinal cord stimulator    Tonsillectomy            PAST FAMILY HISTORY:  Family History   Problem Relation Age of Onset    Cancer Father         bladder cancer    Heart Disease Father         congenital heart disease    Heart Attack Father 65        MI - cause of death    Diabetes Father     Heart Disorder Father     Cancer Sister         lung cancer    Cancer Brother         lung cancer    Heart Disease Brother         CAD    Heart Attack Brother 58        MI    Diabetes Brother     Heart Disorder Brother     Heart Disease Mother         CAD    Heart Surgery Mother 80        CABG    Dementia Mother     Heart Disorder Mother     Heart Disease Other         CAD          PAST SOCIAL HISTORY:  Social History     Socioeconomic History    Marital status:    Tobacco Use    Smoking status: Former     Current packs/day: 0.00     Average packs/day: 0.8 packs/day for 25.0 years (18.8 ttl pk-yrs)     Types: Cigarettes     Start date: 6/15/1978     Quit date: 6/15/2003     Years since quittin.5     Passive exposure: Past    Smokeless tobacco: Never   Vaping Use    Vaping status: Never Used   Substance and Sexual Activity    Alcohol use: No    Drug use: No   Other Topics Concern    Caffeine Concern Yes     Comment: Coffee 1 cup daily    Exercise Yes     Comment: 3 days weekly     Social Drivers of Health      Received from [facility], [facility]    Social Connections    Received from [facility], [facility]     Housing Stability   Quit smoking in 2003 when had CABG. 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 No OP exudates  NECK/THROAT: no JVD. Trachea midline. No obvious thyromegaly  LUNG: clear upper b/l no wheezing, + basilar crackles. Chest symmetric with respiratory motion  HEART: regular rate and rhythm, no obvious murmers or gallops note  ABD: soft non tender. + bowel sounds. No organomegaly noted  EXT: no clubbing, cyanosis, or edema noted. Pulses intact grossly  NEURO/MUSCULOSKELETAL: no gross deficits  SKIN: warm, dry. No obvious lesions noted  LYMPH: no obvious LAD       IMAGES:  Chest CT high resolution 10/31/24  Indep read: chronic pleural plaques b/l with diaphragmatic plaques. No obvious suspicious lesion seen or effusion   CONCLUSION:   1. Calcified pleural plaque formation again noted throughout the chest consistent with chronic asbestos-related pleural disease.  There is mild emphysema with evidence of mild subpleural fibrosis at both lung bases including suspected very minimal honeycombing in the left lower lobe.  Alternatively, these findings could relate to the aforementioned subpleural fibrosis superimposed on pre-existing paraseptal emphysematous changes.  These findings could relate to combined pulmonary fibrosis and emphysema or potentially mild asbestosis.   2. Postoperative changes again noted from a previous CABG.   3. Partial visualization of a known aortobiiliac stent graft.   4. Chronic sequela of a  healed granulomatous process.   5. Lesser incidental findings as above.     CXR 4/2011  CONCLUSION:  1. Postop changes about the heart.   2. Atherosclerosis.   3. Calcified and noncalcified pleural plaquing.   4. Old granulomatous disease.   5. Kyphoscoliosis.   6. Degenerative arthritis.   7. Wedging of midthoracic vertebral bodies.        LABS:  Lab Results   Component Value Date    WBC 6.8 01/03/2025    RBC 4.74 01/03/2025    HGB 13.5 01/03/2025    HCT 40.8 01/03/2025    MCV 86.1 01/03/2025    MCH 28.5 01/03/2025    MCHC 33.1 01/03/2025    MPV 8.4 12/15/2014     Recent Labs   Lab 01/03/25  1328   GLU 92   BUN 31*   CREATSERUM 1.13   EGFRCR 66   CA 10.0   ALB 4.2      K 4.7      CO2 28.0   ALKPHO 131*   AST 33   ALT 8*   BILT 0.6   TP 7.4          PFTS none    PSG/CPAP titration results:  none       ASSESSMENT/PLAN:  Chronic pleural plaques secondary asbestos exposure  -Has been present dating back to imaging from 2011  -Continue to monitor with repeat chest CT scan in 10/2025     Hx of previous smoking and CT with concern for emphysema with possible CPFE  -Check PFTS  -May benefit from inhaler pending PFTs. Currently is quite asymptomatic    RTC in 6 months. Sooner if needed      Thank you for the opportunity to care for Baron Cleaning.    I spent a total of 45 minutes in direct contact with the patient and reviewing pertinent outside records on the day of the encounter.     JASON Shearer DO, MPH  Pulmonary and Critical Care Medicine  [facility] Pulmonary and Critical Care Medicine          [1] No Known Allergies  [2]   Current Outpatient Medications on File Prior to Visit   Medication Sig Dispense Refill    ciprofloxacin 500 MG Oral Tab Take 1 tablet (500 mg total) by mouth 2 (two) times daily. If needed for UTI symptoms      carbidopa-levodopa  MG Oral Tab Take 0.5 tablets by mouth 3 (three) times daily. 135 tablet 3    acetaminophen 500 MG Oral Tab Take 1 tablet (500 mg total) by  mouth every 6 (six) hours as needed for Pain.      atorvastatin 40 MG Oral Tab Take 1 tablet (40 mg total) by mouth daily.      Multiple Vitamins-Minerals (ICAPS AREDS FORMULA) Oral Tab Take 1 tablet by mouth daily.      aspirin 81 MG Oral Tab Take 1 tablet (81 mg total) by mouth daily.      Vitamin C (VITAMIN C) 500 MG Oral Tab Take 1 tablet (500 mg total) by mouth daily.      Multiple Vitamins-Minerals (MENS MULTIVITAMIN PLUS) Oral Tab Take 1 tablet by mouth daily.      Calcium Carb-Cholecalciferol 600-200 MG-UNIT Oral Tab Take 1 tablet by mouth 2 (two) times daily.       No current facility-administered medications on file prior to visit.

## 2025-01-09 NOTE — PATIENT INSTRUCTIONS
Schedule a Pulmonary Function Test  -Based on the test, we may start you on an inhaler      Schedule a repeat Chest CT scan in 10/2025    Come back in 6 months if you continue to feel well.

## 2025-01-11 ENCOUNTER — HOSPITAL ENCOUNTER (OUTPATIENT)
Dept: CT IMAGING | Facility: HOSPITAL | Age: 80
Discharge: HOME | End: 2025-01-11
Attending: INTERNAL MEDICINE
Payer: MEDICARE

## 2025-01-11 DIAGNOSIS — R53.83 FATIGUE, UNSPECIFIED TYPE: ICD-10-CM

## 2025-01-11 DIAGNOSIS — R63.4 WEIGHT LOSS: ICD-10-CM

## 2025-01-11 PROCEDURE — 74177 CT ABD & PELVIS W/CONTRAST: CPT | Performed by: INTERNAL MEDICINE

## 2025-01-15 ENCOUNTER — TELEPHONE (OUTPATIENT)
Dept: NEUROLOGY | Facility: CLINIC | Age: 80
End: 2025-01-15

## 2025-01-15 NOTE — TELEPHONE ENCOUNTER
[facility] home update from [facility], wanting to advised pt missed occupational therapy visit this week due to pt request. Pt to be seen week of 1/19.

## 2025-01-17 ENCOUNTER — APPOINTMENT (OUTPATIENT)
Dept: UROLOGY | Age: 80
End: 2025-01-17

## 2025-01-17 ENCOUNTER — HOSPITAL ENCOUNTER (OUTPATIENT)
Dept: RESPIRATORY THERAPY | Facility: HOSPITAL | Age: 80
Discharge: HOME | End: 2025-01-17
Attending: INTERNAL MEDICINE
Payer: MEDICARE

## 2025-01-17 VITALS
WEIGHT: 154.2 LBS | TEMPERATURE: 98.1 F | HEART RATE: 69 BPM | OXYGEN SATURATION: 97 % | RESPIRATION RATE: 18 BRPM | BODY MASS INDEX: 23.37 KG/M2 | SYSTOLIC BLOOD PRESSURE: 110 MMHG | HEIGHT: 68 IN | DIASTOLIC BLOOD PRESSURE: 56 MMHG

## 2025-01-17 DIAGNOSIS — N39.0 ACUTE UTI: Primary | ICD-10-CM

## 2025-01-17 DIAGNOSIS — R31.0 GROSS HEMATURIA: ICD-10-CM

## 2025-01-17 DIAGNOSIS — Z87.438 HISTORY OF BPH: ICD-10-CM

## 2025-01-17 DIAGNOSIS — J92.0 CALCIFIED PLEURAL PLAQUE DUE TO ASBESTOS EXPOSURE: ICD-10-CM

## 2025-01-17 DIAGNOSIS — J43.9 PULMONARY EMPHYSEMA, UNSPECIFIED EMPHYSEMA TYPE (HCC): ICD-10-CM

## 2025-01-17 DIAGNOSIS — N20.0 NEPHROLITHIASIS: ICD-10-CM

## 2025-01-17 DIAGNOSIS — N32.81 OVERACTIVE BLADDER: ICD-10-CM

## 2025-01-17 DIAGNOSIS — J84.9 ILD (INTERSTITIAL LUNG DISEASE) (HCC): ICD-10-CM

## 2025-01-17 LAB
APPEARANCE, POC: CLEAR
BILIRUB UR QL STRIP: NEGATIVE
COLOR UR: YELLOW
GLUCOSE UR-MCNC: NEGATIVE MG/DL
HGB UR QL STRIP: NEGATIVE
KETONES UR STRIP-MCNC: NEGATIVE MG/DL
LEUKOCYTE ESTERASE UR QL STRIP: ABNORMAL
NITRITE UR QL STRIP: NEGATIVE
PH UR: 5.5 [PH] (ref 5–7)
PROT UR-MCNC: ABNORMAL MG/DL
SP GR UR: 1.02 (ref 1–1.03)
UROBILINOGEN UR-MCNC: 0.2 MG/DL (ref 0–1)

## 2025-01-17 PROCEDURE — 94726 PLETHYSMOGRAPHY LUNG VOLUMES: CPT | Performed by: INTERNAL MEDICINE

## 2025-01-17 PROCEDURE — 94060 EVALUATION OF WHEEZING: CPT | Performed by: INTERNAL MEDICINE

## 2025-01-17 PROCEDURE — 94729 DIFFUSING CAPACITY: CPT | Performed by: INTERNAL MEDICINE

## 2025-01-17 NOTE — PROCEDURES
[facility]  part of [facility]     Pulmonary Function Test     Baron Cleaning Patient Status:  Outpatient    1945 MRN [id number]   Date of Exam  PCP Be Alvarado MD           Spirometry   FEV1: 2.98 115%  FVC: 4.03 115%  FEV1/FVC: 0.74    Lung Volume   T.06 92%  RV : 1.93 73%    Diffusion Capacity   DLCO: 14.97 65%    Flow Volume Loop       Impression   No evidence of obstructive defect seen without significant post bronchodilator response observed. Normal lung volumes. Mild decrease in diffusion capacity.     Ethan Estrada DO  Pulmonary Critical Care Medicine  [facility]

## 2025-01-18 LAB — BACTERIA UR CULT: ABNORMAL

## 2025-01-19 ENCOUNTER — TELEPHONE (OUTPATIENT)
Dept: INTERNAL MEDICINE CLINIC | Facility: CLINIC | Age: 80
End: 2025-01-19

## 2025-01-19 ENCOUNTER — MOBILE (OUTPATIENT)
Dept: UROLOGY | Age: 80
End: 2025-01-19

## 2025-01-19 RX ORDER — TROSPIUM CHLORIDE ER 60 MG/1
60 CAPSULE ORAL DAILY
Qty: 30 CAPSULE | Refills: 0 | Status: SHIPPED | OUTPATIENT
Start: 2025-01-19

## 2025-01-19 NOTE — TELEPHONE ENCOUNTER
Shailesh Shearer DO  [facility] Pulmo Clinical Staff; Be Alvarado MD  Pulmonary Function Testing shows quite normal values except for a decrease in the diffusing capacity which can be seen in mild pulmonary fibrosis and/or emphysema.    Recommend trying an inhaler such as Incruse, Spiriva (2.5 mcg), Tudorza: whichever one insurance will cover.    Will ask our staff to let the pt know and send the prescription.    Thanks,    JASON Shearer DO, MPH  Pulmonary Critical Care Medicine  [facility] Pulmonary and Critical Care Medicine

## 2025-01-20 ENCOUNTER — TELEPHONE (OUTPATIENT)
Dept: PULMONOLOGY | Facility: CLINIC | Age: 80
End: 2025-01-20

## 2025-01-20 DIAGNOSIS — J44.9 CHRONIC OBSTRUCTIVE PULMONARY DISEASE, UNSPECIFIED COPD TYPE (HCC): Primary | ICD-10-CM

## 2025-01-20 RX ORDER — TIOTROPIUM BROMIDE INHALATION SPRAY 3.12 UG/1
1 SPRAY, METERED RESPIRATORY (INHALATION) DAILY
Qty: 1 EACH | Refills: 5 | Status: SHIPPED | OUTPATIENT
Start: 2025-01-20

## 2025-01-20 NOTE — TELEPHONE ENCOUNTER
Pulmonary Function Testing shows quite normal values except for a decrease in the diffusing capacity which can be seen in mild pulmonary fibrosis and/or emphysema.     Recommend trying an inhaler such as Incruse, Spiriva (2.5 mcg), Tudorza: whichever one insurance will cover.     Will ask our staff to let the pt know and send the prescription.     Thanks,     JASON Shearer DO, MPH  Pulmonary Critical Care Medicine  [facility] Pulmonary and Critical Care Medicine    Discussed results and recommendations with patient. Patient verbalized understanding    Dr Shearer- please sign off on pended order

## 2025-01-24 ENCOUNTER — MED REC SCAN ONLY (OUTPATIENT)
Dept: NEUROLOGY | Facility: CLINIC | Age: 80
End: 2025-01-24

## 2025-01-24 NOTE — TELEPHONE ENCOUNTER
Received fax from [facility]. Order number [id number]. Placed in provider bin for review and signature

## 2025-01-27 ENCOUNTER — TELEPHONE (OUTPATIENT)
Dept: NEUROLOGY | Facility: CLINIC | Age: 80
End: 2025-01-27

## 2025-01-27 NOTE — TELEPHONE ENCOUNTER
Received fax from [facility] order number [id number]. Placed in provider bin for signature

## 2025-01-30 ENCOUNTER — TELEPHONE (OUTPATIENT)
Dept: INTERNAL MEDICINE CLINIC | Facility: CLINIC | Age: 80
End: 2025-01-30

## 2025-01-30 ENCOUNTER — HOSPITAL ENCOUNTER (OUTPATIENT)
Dept: CT IMAGING | Facility: HOSPITAL | Age: 80
Discharge: HOME | End: 2025-01-30
Attending: INTERNAL MEDICINE
Payer: MEDICARE

## 2025-01-30 DIAGNOSIS — J92.0 CALCIFIED PLEURAL PLAQUE DUE TO ASBESTOS EXPOSURE: ICD-10-CM

## 2025-01-30 DIAGNOSIS — J43.9 PULMONARY EMPHYSEMA, UNSPECIFIED EMPHYSEMA TYPE (HCC): ICD-10-CM

## 2025-01-30 DIAGNOSIS — J84.9 ILD (INTERSTITIAL LUNG DISEASE) (HCC): ICD-10-CM

## 2025-01-30 PROCEDURE — 71250 CT THORAX DX C-: CPT | Performed by: INTERNAL MEDICINE

## 2025-02-05 ENCOUNTER — TELEPHONE (OUTPATIENT)
Dept: INTERNAL MEDICINE CLINIC | Facility: CLINIC | Age: 80
End: 2025-02-05

## 2025-02-07 ENCOUNTER — TELEPHONE (OUTPATIENT)
Dept: INTERNAL MEDICINE CLINIC | Facility: CLINIC | Age: 80
End: 2025-02-07

## 2025-02-07 ENCOUNTER — PATIENT MESSAGE (OUTPATIENT)
Dept: PULMONOLOGY | Facility: CLINIC | Age: 80
End: 2025-02-07

## 2025-02-07 DIAGNOSIS — J92.0 CALCIFIED PLEURAL PLAQUE DUE TO ASBESTOS EXPOSURE: Primary | ICD-10-CM

## 2025-02-07 NOTE — TELEPHONE ENCOUNTER
Shailesh Shearer DO P  Pulmo Clinical Staff; Be Alavrado MD  Chest CT scans shows the pleural plaques and other chronic changes. Not sure why he did the ct scan so soon after the previous one. Our instructions were to repeat a scan in 10/2025-so this is too early.    Thanks  Please let him know    JASON Shearer DO, MPH  Pulmonary Critical Care Medicine  [facility] Pulmonary and Critical Care Medicine

## 2025-02-11 ENCOUNTER — TELEPHONE (OUTPATIENT)
Dept: INTERNAL MEDICINE CLINIC | Facility: CLINIC | Age: 80
End: 2025-02-11

## 2025-02-14 ENCOUNTER — TELEPHONE (OUTPATIENT)
Dept: NEUROLOGY | Facility: CLINIC | Age: 80
End: 2025-02-14

## 2025-02-14 ENCOUNTER — MED REC SCAN ONLY (OUTPATIENT)
Dept: NEUROLOGY | Facility: CLINIC | Age: 80
End: 2025-02-14

## 2025-02-14 NOTE — TELEPHONE ENCOUNTER
Rcvd fax from [facility] order [id number]. Placed in provider bin .

## 2025-02-18 ENCOUNTER — TELEPHONE (OUTPATIENT)
Dept: NEUROLOGY | Facility: CLINIC | Age: 80
End: 2025-02-18

## 2025-02-18 NOTE — TELEPHONE ENCOUNTER
Pts wife called looking to see about getting pt in sooner than April due to apt for 2/18 being cancelled.

## 2025-02-19 ENCOUNTER — APPOINTMENT (OUTPATIENT)
Dept: UROLOGY | Age: 80
End: 2025-02-19

## 2025-02-19 VITALS
RESPIRATION RATE: 18 BRPM | BODY MASS INDEX: 23.34 KG/M2 | HEIGHT: 68 IN | SYSTOLIC BLOOD PRESSURE: 108 MMHG | DIASTOLIC BLOOD PRESSURE: 63 MMHG | HEART RATE: 71 BPM | WEIGHT: 154 LBS

## 2025-02-19 DIAGNOSIS — Z87.440 HISTORY OF UTI: ICD-10-CM

## 2025-02-19 DIAGNOSIS — N20.0 NEPHROLITHIASIS: ICD-10-CM

## 2025-02-19 DIAGNOSIS — N32.81 OVERACTIVE BLADDER: Primary | ICD-10-CM

## 2025-02-19 LAB — BLDR SCAN MLS: NORMAL

## 2025-02-19 RX ORDER — DARIFENACIN 15 MG/1
15 TABLET, EXTENDED RELEASE ORAL DAILY
Qty: 30 TABLET | Refills: 0 | Status: SHIPPED | OUTPATIENT
Start: 2025-02-19

## 2025-02-25 ENCOUNTER — OFFICE VISIT (OUTPATIENT)
Dept: NEUROLOGY | Facility: CLINIC | Age: 80
End: 2025-02-25
Payer: MEDICARE

## 2025-02-25 ENCOUNTER — LAB ENCOUNTER (OUTPATIENT)
Dept: LAB | Facility: HOSPITAL | Age: 80
End: 2025-02-25
Attending: INTERNAL MEDICINE
Payer: MEDICARE

## 2025-02-25 ENCOUNTER — TELEPHONE (OUTPATIENT)
Dept: NEUROLOGY | Facility: CLINIC | Age: 80
End: 2025-02-25

## 2025-02-25 ENCOUNTER — TELEPHONE (OUTPATIENT)
Dept: INTERNAL MEDICINE CLINIC | Facility: CLINIC | Age: 80
End: 2025-02-25

## 2025-02-25 ENCOUNTER — OFFICE VISIT (OUTPATIENT)
Dept: INTERNAL MEDICINE CLINIC | Facility: CLINIC | Age: 80
End: 2025-02-25
Payer: MEDICARE

## 2025-02-25 VITALS
SYSTOLIC BLOOD PRESSURE: 106 MMHG | HEIGHT: 68 IN | TEMPERATURE: 98 F | HEART RATE: 64 BPM | OXYGEN SATURATION: 98 % | DIASTOLIC BLOOD PRESSURE: 54 MMHG | WEIGHT: 162 LBS | BODY MASS INDEX: 24.55 KG/M2

## 2025-02-25 VITALS — SYSTOLIC BLOOD PRESSURE: 124 MMHG | DIASTOLIC BLOOD PRESSURE: 77 MMHG | OXYGEN SATURATION: 98 % | HEART RATE: 64 BPM

## 2025-02-25 DIAGNOSIS — Z85.810 HISTORY OF TONGUE CANCER: ICD-10-CM

## 2025-02-25 DIAGNOSIS — Z96.89 S/P INSERTION OF SPINAL CORD STIMULATOR: ICD-10-CM

## 2025-02-25 DIAGNOSIS — R63.4 WEIGHT LOSS: Primary | ICD-10-CM

## 2025-02-25 DIAGNOSIS — Z00.00 ROUTINE HEALTH MAINTENANCE: ICD-10-CM

## 2025-02-25 DIAGNOSIS — G20.A1 PARKINSON'S DISEASE WITHOUT DYSKINESIA OR FLUCTUATING MANIFESTATIONS (HCC): ICD-10-CM

## 2025-02-25 DIAGNOSIS — G20.A1 PARKINSON'S DISEASE WITHOUT DYSKINESIA OR FLUCTUATING MANIFESTATIONS (HCC): Primary | ICD-10-CM

## 2025-02-25 DIAGNOSIS — R26.81 GAIT INSTABILITY: ICD-10-CM

## 2025-02-25 DIAGNOSIS — R63.4 WEIGHT LOSS: ICD-10-CM

## 2025-02-25 DIAGNOSIS — E78.5 HYPERLIPIDEMIA, UNSPECIFIED HYPERLIPIDEMIA TYPE: ICD-10-CM

## 2025-02-25 DIAGNOSIS — Z86.0101 HISTORY OF ADENOMATOUS POLYP OF COLON: ICD-10-CM

## 2025-02-25 LAB
ANION GAP SERPL CALC-SCNC: 7 MMOL/L (ref 0–18)
BUN BLD-MCNC: 24 MG/DL (ref 9–23)
BUN/CREAT SERPL: 23.1 (ref 10–20)
CALCIUM BLD-MCNC: 8.9 MG/DL (ref 8.7–10.4)
CHLORIDE SERPL-SCNC: 107 MMOL/L (ref 98–112)
CO2 SERPL-SCNC: 28 MMOL/L (ref 21–32)
CREAT BLD-MCNC: 1.04 MG/DL
EGFRCR SERPLBLD CKD-EPI 2021: 73 ML/MIN/1.73M2 (ref 60–?)
FASTING STATUS PATIENT QL REPORTED: NO
GLUCOSE BLD-MCNC: 78 MG/DL (ref 70–99)
OSMOLALITY SERPL CALC.SUM OF ELEC: 297 MOSM/KG (ref 275–295)
POTASSIUM SERPL-SCNC: 4.2 MMOL/L (ref 3.5–5.1)
SODIUM SERPL-SCNC: 142 MMOL/L (ref 136–145)

## 2025-02-25 PROCEDURE — 3074F SYST BP LT 130 MM HG: CPT | Performed by: OTHER

## 2025-02-25 PROCEDURE — 80048 BASIC METABOLIC PNL TOTAL CA: CPT

## 2025-02-25 PROCEDURE — 3074F SYST BP LT 130 MM HG: CPT | Performed by: INTERNAL MEDICINE

## 2025-02-25 PROCEDURE — 1126F AMNT PAIN NOTED NONE PRSNT: CPT | Performed by: INTERNAL MEDICINE

## 2025-02-25 PROCEDURE — 3008F BODY MASS INDEX DOCD: CPT | Performed by: INTERNAL MEDICINE

## 2025-02-25 PROCEDURE — 1159F MED LIST DOCD IN RCRD: CPT | Performed by: OTHER

## 2025-02-25 PROCEDURE — 36415 COLL VENOUS BLD VENIPUNCTURE: CPT

## 2025-02-25 PROCEDURE — 1160F RVW MEDS BY RX/DR IN RCRD: CPT | Performed by: OTHER

## 2025-02-25 PROCEDURE — 3078F DIAST BP <80 MM HG: CPT | Performed by: OTHER

## 2025-02-25 PROCEDURE — 99214 OFFICE O/P EST MOD 30 MIN: CPT | Performed by: INTERNAL MEDICINE

## 2025-02-25 PROCEDURE — 99214 OFFICE O/P EST MOD 30 MIN: CPT | Performed by: OTHER

## 2025-02-25 PROCEDURE — 3078F DIAST BP <80 MM HG: CPT | Performed by: INTERNAL MEDICINE

## 2025-02-25 RX ORDER — CARBIDOPA AND LEVODOPA 25; 100 MG/1; MG/1
0.5 TABLET ORAL 2 TIMES DAILY
Qty: 90 TABLET | Refills: 3 | Status: SHIPPED | OUTPATIENT
Start: 2025-02-25 | End: 2026-02-20

## 2025-02-25 RX ORDER — DARIFENACIN 15 MG/1
15 TABLET, EXTENDED RELEASE ORAL DAILY
COMMUNITY

## 2025-02-25 NOTE — PROGRESS NOTES
[facility]  [address], [address]  [place] [id number] [phone]          Established  Follow Up Visit       Date: February 25, 2025  Patient Name: Baron Cleaning   MRN: [id number]  Primary physician: Navdeep Galicia  [facility] [id number]    Interval History:   --Patient is doing very well on reduced dose of Sinemet and home therapy.  He is doing PT, OT and speech (including swallow) therapy.  Has bilateral AFO boots that have helped his gait very well.  Has low back pain, unilateral (either side) with radiation to his hips; uses a walker if he gets up in the middle of the night to use the restroom.  Does not think this is leading to weakness.  Higher doses of Sinemet caused drowsiness.      OUTPATIENT MEDICATIONS  Medications Ordered Prior to Encounter[1]      PHYSICAL EXAM:   /77 (BP Location: Left arm, Patient Position: Sitting)   Pulse 64   SpO2 98%   General appearance: Well appearing, and in no acute distress  Skin: skin color, texture normal.  No rashes or lesions.    Head: Normocephalic, atraumatic.    Neurological exam:  Awake, alert, pleasant and cooperative, hypophonia and hypomimia, no aphasia    Mildly increased tone in UE  No resting tremors  Bradykinesia    Gait is stable - some dorsiflexion movements bilaterally, reduced arm swing     LABS/DATA:  Reviewed vitamin B12, TSH, CMP with mildly elevated alkaline phosphatase, CBC      IMAGING:  No new pertinent neuroimaging    ASSESSMENT:  The patient is a 79 year old  man with past medical history of orthostatic hypotension, lumbar spondylosis with possible left L4 radiculopathy status post spinal cord stimulator, hyperlipidemia, osteopenia, glaucoma, coronary artery disease, abdominal aortic aneurysm, head/neck and tongue cancer presents for follow up.       He has mild parkinsonism on exam with improved resting tremors.  He has bilateral foot drop and lower extremity weakness which is likely related to  lumbar spondylosis/radiculopathy, hip pain, neuropathy with motor involvement as well.     -Cannot have MRI due to spinal cord stimulator.  -Neuropathy labs: Elevated kappa, elevated kappa/lambda ratio.  Normal immunofixation.  Hemoglobin A1c 5.9 consistent with prediabetes.  Normal heavy metals, vitamin E, copper, B12/MMA, TSH, thiamine and vitamin B6 levels.     Parkinson disease without dyskinesias, mild in severity.  -Continue Sinemet 0.5 tablets 2 times daily.   -Fall precautions   - Sleep disorder precautions  - Constipation management   - Home therapy   - Future consideration: addition of Pramipexole rather than increasing Sinemet      Gait instability is likely multifactorial as stated above.  His neuropathy may have progressed to involve motor as well as sensory; lumbosacral spondylosis with radiculopathy; hip pathology; chronic pain syndrome from his low back and hip  -Home health PT, OT and speech therapy  - AFO boots   - He should follow-up with podiatry for his neuropathy    Follow up: 6 months     Discussed indication, administration, dose, and side effects with patient of any medications personally prescribed. Patient was advised to let my office know if they have any questions or concerns.       Today, I personally spent 20 minutes in this case, including chart review, time spent with patient doing face to face evaluation w/ interview and exam and patient education, counseling, and time was spent in patient education, counseling, and coordination of care as described above.   Issues discussed: Diagnosis and implications on future health, benefits and side effects of present and future medications, test results as well as further testing and medications required.    This note was prepared using Dragon Medical voice recognition dictation software and as a result, errors may occur. When identified, these errors have been corrected. While every attempt is made to correct errors during dictation,  discrepancies may still exist    BRANDEN Bejarano DO   Staff Physician, Neurology   2/25/2025  3:50 PM               [1]   Current Outpatient Medications on File Prior to Visit   Medication Sig Dispense Refill    darifenacin ER 15 MG Oral Tablet 24 Hr Take 1 tablet (15 mg total) by mouth daily.      Tiotropium Bromide Monohydrate (SPIRIVA RESPIMAT) 2.5 MCG/ACT Inhalation Aero Soln Inhale 1 puff into the lungs daily. 1 each 5    carbidopa-levodopa  MG Oral Tab Take 0.5 tablets by mouth in the morning and 0.5 tablets before bedtime. 90 tablet 3    acetaminophen 500 MG Oral Tab Take 1 tablet (500 mg total) by mouth every 6 (six) hours as needed for Pain.      atorvastatin 40 MG Oral Tab Take 1 tablet (40 mg total) by mouth daily.      Multiple Vitamins-Minerals (ICAPS AREDS FORMULA) Oral Tab Take 1 tablet by mouth daily.      aspirin 81 MG Oral Tab Take 1 tablet (81 mg total) by mouth daily.      Vitamin C (VITAMIN C) 500 MG Oral Tab Take 1 tablet (500 mg total) by mouth daily.      Multiple Vitamins-Minerals (MENS MULTIVITAMIN PLUS) Oral Tab Take 1 tablet by mouth daily.      Calcium Carb-Cholecalciferol 600-200 MG-UNIT Oral Tab Take 1 tablet by mouth 2 (two) times daily.       No current facility-administered medications on file prior to visit.

## 2025-02-25 NOTE — PROGRESS NOTES
Baron Cleaning is a 79 year old male.  HPI:     Chief Complaint   Patient presents with    Checkup     1 month, lower back pain 7/10 when getting up to go bathroom in middle night      Baron is here with his wife Ayesha.    He has seemed to gain weight.  At home he was in the 152 up to 153 pound range.  At home today he was 158 pounds.  The scale here read 162 pounds but he has braces on both feet accounting for some of the weight gain.    He follows with Dr. Bejarano for his Parkinson's symptoms.    He we will get a repeat CT scan per  October 2025.    He follows with cardiology.  He also follows with Dr. Macias from urology.  He is on Enablex for last 2 days and seems to be tolerating that well.    All in all I think he is doing fairly well without any acute cardiac pulmonary GI or  problems.    He is up-to-date with his labs.  Will recheck BMP because of elevated BUN in the past.  He is keeping his fluids up.  Current Outpatient Medications   Medication Sig Dispense Refill    Tiotropium Bromide Monohydrate (SPIRIVA RESPIMAT) 2.5 MCG/ACT Inhalation Aero Soln Inhale 1 puff into the lungs daily. 1 each 5    acetaminophen 500 MG Oral Tab Take 1 tablet (500 mg total) by mouth every 6 (six) hours as needed for Pain.      atorvastatin 40 MG Oral Tab Take 1 tablet (40 mg total) by mouth daily.      Multiple Vitamins-Minerals (ICAPS AREDS FORMULA) Oral Tab Take 1 tablet by mouth daily.      aspirin 81 MG Oral Tab Take 1 tablet (81 mg total) by mouth daily.      Vitamin C (VITAMIN C) 500 MG Oral Tab Take 1 tablet (500 mg total) by mouth daily.      Multiple Vitamins-Minerals (MENS MULTIVITAMIN PLUS) Oral Tab Take 1 tablet by mouth daily.      Calcium Carb-Cholecalciferol 600-200 MG-UNIT Oral Tab Take 1 tablet by mouth 2 (two) times daily.      darifenacin ER 15 MG Oral Tablet 24 Hr Take 1 tablet (15 mg total) by mouth daily.      carbidopa-levodopa  MG Oral Tab Take 0.5 tablets by mouth in the morning and  0.5 tablets before bedtime. 90 tablet 3      Past Medical History:    AAA (abdominal aortic aneurysm)    Appendicitis    Arthritis    ASHD (arteriosclerotic heart disease)    LIMA graft to LAD    Cancer (HCC)    Chronic hip pain    s/p spinal cord stimulator    Colon polyps    Congestive heart disease (HCC)    Diabetes insipidus (HCC)    Elevated prostate specific antigen (PSA)    Elevated PSA    Fall    c/b rib fractures    Glaucoma    slight glaucoma in one eye    Hiatal hernia    Hypomagnesemia    Low TSH level    Lung disease    Asbestos related lung disease    Nonspecific abnormal serum enzyme levels    Orthostatic hypotension    Other and unspecified hyperlipidemia    Parkinson disease (HCC)    PEG (percutaneous endoscopic gastrostomy) status (HCC)    Pleural plaque    calcified pleural plaques    Pleurisy    Tongue cancer (HCC)    squamous cell carcinoma      Social History:  Social History     Socioeconomic History    Marital status:    Tobacco Use    Smoking status: Former     Current packs/day: 0.00     Average packs/day: 0.8 packs/day for 25.0 years (18.8 ttl pk-yrs)     Types: Cigarettes     Start date: 6/15/1978     Quit date: 6/15/2003     Years since quittin.7     Passive exposure: Past    Smokeless tobacco: Never   Vaping Use    Vaping status: Never Used   Substance and Sexual Activity    Alcohol use: No    Drug use: No   Other Topics Concern    Caffeine Concern Yes     Comment: Coffee 1 cup daily    Exercise Yes     Comment: 3 days weekly     Social Drivers of Health      [facility], [facility]    Housing Stability        REVIEW OF SYSTEMS:   GENERAL HEALTH:  feels well otherwise  RESPIRATORY:  Voices no shortness of breath with exertion or cough  CARDIOVASCULAR:  Voices no chest pain on exertion or shortness of breath  GI:   Voices no abdominal pain or changes of bowels.  He is on Colace now that seems to be helping his  constipation   :Viices no urning or frequency of urination.  NEURO:  Voices no  headaches or dizziness    EXAM:   /54   Pulse 64   Temp 98 °F (36.7 °C)   Ht 5' 8\" (1.727 m)   Wt 162 lb (73.5 kg)   SpO2 98%   BMI 24.63 kg/m²     GENERAL:  well developed, well nourished, in no apparent distress  SKIN:  no rashes ,   HEENT: atraumatic.   Pharynx normal without exudate.  EYES:  PERRL. Sclera anicteric.  NECK:  Supple,  no adenopathy,    LUNGS:  clear to auscultation.  Effort normal  CARDIO:  RRR without murmur.   S1 and S2 normal  GI:  good BS's,  no masses,   HSM or tenderness  EXTREMITIES : no cyanosis, clubbing or edema    ASSESSMENT AND PLAN:     1. Weight loss  Seems to be gaining weight now.  He does indicate that he is 2 good meals a day.  He has a good breakfast and a good dinner.  I think this is very sufficient for him.  - Basic Metabolic Panel (8); Future    2. Hyperlipidemia, unspecified hyperlipidemia type  Hyperlipidemia.  His lipids are at goal.  On statin  - Basic Metabolic Panel (8); Future    3. History of tongue cancer  Remote history of tongue cancer.  He no longer needs any active follow-up with ENT but he did see Dr. Lofton September 19, 2024 from ENT.    4. History of adenomatous polyp of colon  His last colonoscopy was July 2021.  Next colonoscopy for history of polyps would ordinarily be July 2026 depending on his overall health..    5. S/P insertion of spinal cord stimulator  Stable.  No new issues.    6. Routine health maintenance  In the past he has not wanted to have any vaccines.    7. Parkinson's disease without dyskinesia or fluctuating manifestations (HCC)  He follows Dr. Bejarano.  He will see her today.    This visit was 30 minutes.  I spent 10 minutes before visit preparing and reviewing old records.  Greater than 50% of the visit was engaged in counseling and review of past data.    I did tell him that I was retiring June 13.  He will be establishing with a PCP closer to  his home in [place] and the plans are also for establishing other specialist closer to home.    The patient indicates understanding of these issues and agrees to the plan.    Be Alvarado MD  2/25/2025  4:44 PM

## 2025-02-25 NOTE — TELEPHONE ENCOUNTER
Jen from [facility] is looking to get verbal orders from clinical team for pt to continue at home therapy

## 2025-03-03 ENCOUNTER — TELEPHONE (OUTPATIENT)
Dept: NEUROLOGY | Facility: CLINIC | Age: 80
End: 2025-03-03

## 2025-03-03 ENCOUNTER — MED REC SCAN ONLY (OUTPATIENT)
Dept: NEUROLOGY | Facility: CLINIC | Age: 80
End: 2025-03-03

## 2025-03-11 ENCOUNTER — TELEPHONE (OUTPATIENT)
Dept: NEUROLOGY | Facility: CLINIC | Age: 80
End: 2025-03-11

## 2025-03-11 NOTE — TELEPHONE ENCOUNTER
Received fax from [facility] of a notice of Denial of Medical Coverage. Placed in provider bin for review

## 2025-03-13 ENCOUNTER — PATIENT MESSAGE (OUTPATIENT)
Dept: NEUROLOGY | Facility: CLINIC | Age: 80
End: 2025-03-13

## 2025-03-13 DIAGNOSIS — G20.A1 PARKINSON'S DISEASE WITHOUT DYSKINESIA OR FLUCTUATING MANIFESTATIONS (HCC): Primary | ICD-10-CM

## 2025-03-19 ENCOUNTER — APPOINTMENT (OUTPATIENT)
Dept: UROLOGY | Age: 80
End: 2025-03-19

## 2025-03-24 ENCOUNTER — APPOINTMENT (OUTPATIENT)
Dept: UROLOGY | Age: 80
End: 2025-03-24

## 2025-03-24 VITALS
DIASTOLIC BLOOD PRESSURE: 68 MMHG | HEART RATE: 64 BPM | OXYGEN SATURATION: 98 % | TEMPERATURE: 97.5 F | RESPIRATION RATE: 15 BRPM | WEIGHT: 163.91 LBS | BODY MASS INDEX: 24.92 KG/M2 | SYSTOLIC BLOOD PRESSURE: 106 MMHG

## 2025-03-24 DIAGNOSIS — N32.81 OVERACTIVE BLADDER: Primary | ICD-10-CM

## 2025-03-24 DIAGNOSIS — Z87.440 HISTORY OF UTI: ICD-10-CM

## 2025-03-24 DIAGNOSIS — N20.0 NEPHROLITHIASIS: ICD-10-CM

## 2025-03-24 DIAGNOSIS — Z87.438 HISTORY OF BPH: ICD-10-CM

## 2025-03-24 LAB — BLDR SCAN MLS: NORMAL

## 2025-03-24 RX ORDER — DARIFENACIN 15 MG/1
15 TABLET, EXTENDED RELEASE ORAL DAILY
Qty: 90 TABLET | Refills: 0 | Status: SHIPPED | OUTPATIENT
Start: 2025-03-24

## 2025-03-24 RX ORDER — MIRABEGRON 50 MG/1
50 TABLET, FILM COATED, EXTENDED RELEASE ORAL DAILY
Qty: 90 TABLET | Refills: 0 | Status: SHIPPED | OUTPATIENT
Start: 2025-03-24

## 2025-03-28 RX ORDER — ATORVASTATIN CALCIUM 40 MG/1
40 TABLET, FILM COATED ORAL DAILY
Refills: 0 | OUTPATIENT
Start: 2025-03-28

## 2025-03-28 NOTE — TELEPHONE ENCOUNTER
[facility] called looking for a new order for speech therapy to be sent to them

## 2025-03-28 NOTE — TELEPHONE ENCOUNTER
Refill request has failed the Ambulatory Medication Refill Standing Order and is routed to the primary physician to review the following:    Requested Prescriptions     Refused Prescriptions Disp Refills    atorvastatin 40 MG Oral Tab  0     Sig: Take 1 tablet (40 mg total) by mouth daily.       Medication not filled by Dr. VILLALPANDO

## 2025-04-01 NOTE — TELEPHONE ENCOUNTER
I will never say no to any speech therapy orders.  Next time you can just place orders and I will sign them.

## 2025-04-02 NOTE — TELEPHONE ENCOUNTER
[facility] order [id number] order 2/27 @ 1:10pm+[id number]. Signed by provider, faxed back and fwded to medical records for scanning.

## 2025-04-04 ENCOUNTER — MED REC SCAN ONLY (OUTPATIENT)
Dept: NEUROLOGY | Facility: CLINIC | Age: 80
End: 2025-04-04

## 2025-04-04 ENCOUNTER — TELEPHONE (OUTPATIENT)
Dept: NEUROLOGY | Facility: CLINIC | Age: 80
End: 2025-04-04

## 2025-05-15 ENCOUNTER — MED REC SCAN ONLY (OUTPATIENT)
Dept: NEUROLOGY | Facility: CLINIC | Age: 80
End: 2025-05-15

## 2025-05-15 ENCOUNTER — TELEPHONE (OUTPATIENT)
Dept: NEUROLOGY | Facility: CLINIC | Age: 80
End: 2025-05-15

## 2025-05-15 NOTE — TELEPHONE ENCOUNTER
Received fax from [facility] for Discharge/transfer Summary report. Placed in provider bin for review

## 2025-06-20 NOTE — PROGRESS NOTES
NEUROLOGICAL PHYSICAL THERAPY EVALUATION:   Referring Physician: Dr. Marcela Leavitt  Diagnosis: imbalance      Date of Onset: per HPI Date of Service: 7/13/2021     PATIENT SUMMARY   Orion Multani is a 67year old y/o male who presents to therapy today with r Quality 357: Surgical Site Infection (Ssi): No surgical site infection Additional Comments: ESTEFANIA Diabetes insipidus ([address]    • Diabetes insipidus ([address]    • Elevated PSA    • Fall 2021    c/b rib fractures   • Glaucoma     slight glaucoma in one eye   • Hiatal hernia    • Hypomagnesemia    • Low TSH level    • Low TSH level    • Lung disease     Asbes Change in gait speed  ___2___3. Gait with horizontal head turns   ___2___4. Gait with vertical head turns  ___2___5. Gait and pivot turn  ___3___6. Step over obstacle  ___3___7. Gait with narrow base of support-  # of steps____10____  ___0___8.  Gait with e Detail Level: Detailed Add 45048 Cpt? (Important Note: In 2017 The Use Of 60089 Is Being Tracked By Cms To Determine Future Global Period Reimbursement For Global Periods): yes Date____________________    Certification From: 3/32/4219  To:10/11/2021 Wound Evaluated By: Cecille/Dr Manzo

## 2025-06-23 ENCOUNTER — APPOINTMENT (OUTPATIENT)
Dept: UROLOGY | Age: 80
End: 2025-06-23

## 2025-06-23 VITALS
BODY MASS INDEX: 23.87 KG/M2 | HEART RATE: 68 BPM | TEMPERATURE: 97.1 F | OXYGEN SATURATION: 96 % | DIASTOLIC BLOOD PRESSURE: 80 MMHG | RESPIRATION RATE: 17 BRPM | HEIGHT: 68 IN | WEIGHT: 157.52 LBS | SYSTOLIC BLOOD PRESSURE: 131 MMHG

## 2025-06-23 DIAGNOSIS — Z87.440 HISTORY OF UTI: ICD-10-CM

## 2025-06-23 DIAGNOSIS — N20.0 NEPHROLITHIASIS: ICD-10-CM

## 2025-06-23 DIAGNOSIS — Z87.438 HISTORY OF BPH: ICD-10-CM

## 2025-06-23 DIAGNOSIS — R31.0 GROSS HEMATURIA: ICD-10-CM

## 2025-06-23 DIAGNOSIS — N32.81 OVERACTIVE BLADDER: Primary | ICD-10-CM

## 2025-06-23 LAB — BLDR SCAN MLS: NORMAL

## 2025-06-23 PROCEDURE — 51798 US URINE CAPACITY MEASURE: CPT | Performed by: UROLOGY

## 2025-06-23 PROCEDURE — 99214 OFFICE O/P EST MOD 30 MIN: CPT | Performed by: UROLOGY

## 2025-06-23 RX ORDER — DARIFENACIN 15 MG/1
15 TABLET, EXTENDED RELEASE ORAL DAILY
Qty: 90 TABLET | Refills: 1 | Status: SHIPPED | OUTPATIENT
Start: 2025-06-23

## 2025-08-25 ENCOUNTER — APPOINTMENT (OUTPATIENT)
Dept: CARDIOLOGY | Age: 80
End: 2025-08-25

## 2025-08-25 VITALS
HEIGHT: 68 IN | BODY MASS INDEX: 22.58 KG/M2 | DIASTOLIC BLOOD PRESSURE: 70 MMHG | HEART RATE: 70 BPM | SYSTOLIC BLOOD PRESSURE: 122 MMHG | WEIGHT: 149 LBS | RESPIRATION RATE: 16 BRPM | TEMPERATURE: 97.5 F

## 2025-08-25 DIAGNOSIS — I25.10 CORONARY ARTERY DISEASE INVOLVING NATIVE CORONARY ARTERY OF NATIVE HEART WITHOUT ANGINA PECTORIS: Primary | ICD-10-CM

## 2025-08-25 LAB
ATRIAL RATE (BPM): 70
P AXIS (DEGREES): 10
PR-INTERVAL (MSEC): 118
QRS-INTERVAL (MSEC): 92
QT-INTERVAL (MSEC): 414
QTC: 447
R AXIS (DEGREES): 49
REPORT TEXT: NORMAL
T AXIS (DEGREES): 45
VENTRICULAR RATE EKG/MIN (BPM): 70

## 2025-08-25 PROCEDURE — 99205 OFFICE O/P NEW HI 60 MIN: CPT | Performed by: INTERNAL MEDICINE

## 2025-08-25 PROCEDURE — 93000 ELECTROCARDIOGRAM COMPLETE: CPT | Performed by: INTERNAL MEDICINE

## 2025-08-25 RX ORDER — LIDOCAINE 50 MG/G
2 PATCH TOPICAL EVERY 24 HOURS
COMMUNITY
Start: 2025-06-25

## 2025-08-25 RX ORDER — TIOTROPIUM BROMIDE INHALATION SPRAY 3.12 UG/1
1 SPRAY, METERED RESPIRATORY (INHALATION) DAILY
COMMUNITY
Start: 2025-07-23

## 2025-09-22 ENCOUNTER — APPOINTMENT (OUTPATIENT)
Dept: UROLOGY | Age: 80
End: 2025-09-22

## 2026-08-24 ENCOUNTER — APPOINTMENT (OUTPATIENT)
Dept: CARDIOLOGY | Age: 81
End: 2026-08-24

## (undated) DIAGNOSIS — R76.8 ELEVATED SERUM IMMUNOGLOBULIN FREE LIGHT CHAINS: Primary | ICD-10-CM

## (undated) NOTE — LETTER
3/30/2021              Mally Mireles September 30, 1945        [address]         Dear Dr. Angie Baez,    I have asked Peggy Ortez to see you for colonoscopy.   He will be due for colonoscopy December of this year but he d

## (undated) NOTE — LETTER
[address] [address]  Ph: [phone]  Fax: [phone]       [employer]    1945-        2025    Subject: Appeal for Denied Coverage of Physical Therapy and speech therapy - Claim Number [id number]    To Whom It May Concern,    I am writing to formally appeal the denial of coverage for the prescribed physical therapy sessions under Claim Number [id number], which was denied on March 10, 2025. After reviewing the denial notice, I believe that the decision to deny coverage was made in error based on the medical necessity and details of his policy.    The denial letter stated that the physical therapy sessions were not covered because they were deemed “not medically necessary.” However, I, Dr. Dalila Bejarano, has confirmed that these therapy sessions are crucial for his continued treatment of Parkinson's disease and his multifactorial gait dysfunction that involves sensorimotor neuropathy, lumbosacral spondylosis with radiculopathy, hip pathology and chronic pain syndrome.  With multiple medical issues that are not expected to spontaneously improve, he would benefit from ongoing therapy.  Otherwise, he risks difficulties with his mobility, cognition, and daily functioning as well as swallowing (a significant safety risk in those with Parkinson disease), as outlined in the attached medical documentation.    The attached documents include:    Medical Records: Office visit notes from this office    Letter from Healthcare Provider: A letter from myself, dated 2025, affirming the need for physint further health issues, and they should be covered under the terms of his policy with [employer].    I appreciate your prompt attention to this matter and look forward to a favorable resolution. Please feel free to contact me at [phone] if further information is required.    Thank you for your time and  consideration.    Sincerely,      Dr. Dalila Bejarano, DO    Neurology    [address]    [place] [address] [phone] phone    [phone] Fax

## (undated) NOTE — MR AVS SNAPSHOT
[facility] [place]  [facility] [address] [address]  [phone]               Thank you for choosing us for your health care visit with Yohana Sanchez MD.  We are glad to serve you and happy to provide you with this summary of your visit.   Please Magnesium Cl-Calcium Carbonate 71.5-119 MG Tbec   Take 1 tablet by mouth one time. Commonly known as:  SLOW-MAG           * ICAPS AREDS FORMULA Tabs   Take 1 tablet by mouth daily. * MENS MULTIVITAMIN PLUS Tabs   Take 1 tablet by mouth daily.